# Patient Record
Sex: FEMALE | Race: OTHER | Employment: OTHER | URBAN - METROPOLITAN AREA
[De-identification: names, ages, dates, MRNs, and addresses within clinical notes are randomized per-mention and may not be internally consistent; named-entity substitution may affect disease eponyms.]

---

## 2017-06-13 ENCOUNTER — TELEPHONE (OUTPATIENT)
Dept: FAMILY MEDICINE CLINIC | Facility: CLINIC | Age: 67
End: 2017-06-13

## 2017-07-04 DIAGNOSIS — E78.2 MIXED HYPERLIPIDEMIA: ICD-10-CM

## 2017-07-04 DIAGNOSIS — I10 ESSENTIAL HYPERTENSION WITH GOAL BLOOD PRESSURE LESS THAN 140/90: ICD-10-CM

## 2017-07-05 RX ORDER — LOSARTAN POTASSIUM 100 MG/1
TABLET ORAL
Qty: 30 TABLET | Refills: 2 | Status: SHIPPED | OUTPATIENT
Start: 2017-07-05 | End: 2017-07-10 | Stop reason: ALTCHOICE

## 2017-07-05 RX ORDER — ATORVASTATIN CALCIUM 20 MG/1
TABLET, FILM COATED ORAL
Qty: 30 TABLET | Refills: 2 | Status: SHIPPED | OUTPATIENT
Start: 2017-07-05 | End: 2017-07-10 | Stop reason: ALTCHOICE

## 2017-07-05 NOTE — TELEPHONE ENCOUNTER
Refill as per protocol.     [facility] Visit date not found    Future Appointments  Date Time Provider Jaison Borjas   7/10/2017 11:00 AM Dangelo Rangel DO [facility]          Signed Prescriptions Disp Refills    LOSARTAN 100 MG Oral Tab 30 ta

## 2017-07-10 ENCOUNTER — OFFICE VISIT (OUTPATIENT)
Dept: FAMILY MEDICINE CLINIC | Facility: CLINIC | Age: 67
End: 2017-07-10

## 2017-07-10 VITALS
RESPIRATION RATE: 18 BRPM | OXYGEN SATURATION: 98 % | WEIGHT: 128.81 LBS | DIASTOLIC BLOOD PRESSURE: 60 MMHG | TEMPERATURE: 98 F | SYSTOLIC BLOOD PRESSURE: 110 MMHG | HEIGHT: 62 IN | BODY MASS INDEX: 23.7 KG/M2 | HEART RATE: 80 BPM

## 2017-07-10 DIAGNOSIS — E11.40 TYPE 2 DIABETES MELLITUS WITH DIABETIC NEUROPATHY, WITHOUT LONG-TERM CURRENT USE OF INSULIN (HCC): ICD-10-CM

## 2017-07-10 DIAGNOSIS — M54.6 CHRONIC BILATERAL THORACIC BACK PAIN: Primary | ICD-10-CM

## 2017-07-10 DIAGNOSIS — G89.29 CHRONIC BILATERAL THORACIC BACK PAIN: Primary | ICD-10-CM

## 2017-07-10 DIAGNOSIS — I10 ESSENTIAL HYPERTENSION: ICD-10-CM

## 2017-07-10 PROCEDURE — 99213 OFFICE O/P EST LOW 20 MIN: CPT | Performed by: FAMILY MEDICINE

## 2017-07-11 RX ORDER — CARVEDILOL 6.25 MG/1
6.25 TABLET ORAL DAILY
Qty: 180 TABLET | Refills: 0 | COMMUNITY
Start: 2017-07-11 | End: 2017-11-07

## 2017-07-12 NOTE — PROGRESS NOTES
HPI:   Andres Augustin is a 69year old female who is here for complaints of back pain, DM2, and HTN. Mid back pain, described as aching. Severity is mild, moderate. The pain radiates to no radiation of pain. Pain was precipitated by unknown.  Has had for 2 Smokeless tobacco: Never Used                      Alcohol use:  No                REVIEW OF SYSTEMS:   GENERAL: feels well otherwise  SKIN: denies any unusual skin lesions  LUNGS: denies shortness of breath with exertion  CARDIOVASCULAR: de return if sx's persist or worsen.

## 2017-08-01 DIAGNOSIS — E11.40 TYPE 2 DIABETES MELLITUS WITH DIABETIC NEUROPATHY, UNSPECIFIED LONG TERM INSULIN USE STATUS: ICD-10-CM

## 2017-08-01 DIAGNOSIS — I10 ESSENTIAL HYPERTENSION WITH GOAL BLOOD PRESSURE LESS THAN 140/90: ICD-10-CM

## 2017-08-01 RX ORDER — SPIRONOLACTONE 25 MG/1
25 TABLET ORAL DAILY
Qty: 90 TABLET | Refills: 0 | Status: SHIPPED | OUTPATIENT
Start: 2017-08-01 | End: 2017-11-04

## 2017-08-01 NOTE — TELEPHONE ENCOUNTER
Pending Prescriptions Disp Refills    METFORMIN  MG Oral Tab [Pharmacy Med Name: METFORMIN 500MG TAB] 60 tablet 2     Sig: TAKE ONE TABLET BY MOUTH TWICE DAILY WITH MEALS       Lov7/10/2017, No future appointments. unable to fill requested medicati

## 2017-08-12 DIAGNOSIS — I10 ESSENTIAL HYPERTENSION WITH GOAL BLOOD PRESSURE LESS THAN 140/90: ICD-10-CM

## 2017-08-12 RX ORDER — AMLODIPINE BESYLATE 5 MG/1
5 TABLET ORAL 2 TIMES DAILY
Qty: 180 TABLET | Refills: 0 | Status: SHIPPED | OUTPATIENT
Start: 2017-08-12 | End: 2017-11-04

## 2017-11-01 ENCOUNTER — OFFICE VISIT (OUTPATIENT)
Dept: FAMILY MEDICINE CLINIC | Facility: CLINIC | Age: 67
End: 2017-11-01

## 2017-11-01 VITALS
OXYGEN SATURATION: 100 % | HEART RATE: 66 BPM | WEIGHT: 133 LBS | DIASTOLIC BLOOD PRESSURE: 60 MMHG | TEMPERATURE: 98 F | BODY MASS INDEX: 24.48 KG/M2 | SYSTOLIC BLOOD PRESSURE: 110 MMHG | RESPIRATION RATE: 16 BRPM | HEIGHT: 62 IN

## 2017-11-01 DIAGNOSIS — Z23 NEED FOR VACCINATION: ICD-10-CM

## 2017-11-01 DIAGNOSIS — M25.511 CHRONIC RIGHT SHOULDER PAIN: ICD-10-CM

## 2017-11-01 DIAGNOSIS — G89.29 CHRONIC BILATERAL THORACIC BACK PAIN: ICD-10-CM

## 2017-11-01 DIAGNOSIS — G89.29 CHRONIC BILATERAL LOW BACK PAIN WITHOUT SCIATICA: ICD-10-CM

## 2017-11-01 DIAGNOSIS — M54.50 CHRONIC BILATERAL LOW BACK PAIN WITHOUT SCIATICA: ICD-10-CM

## 2017-11-01 DIAGNOSIS — Z00.00 LABORATORY EXAM ORDERED AS PART OF ROUTINE GENERAL MEDICAL EXAMINATION: ICD-10-CM

## 2017-11-01 DIAGNOSIS — E55.9 VITAMIN D DEFICIENCY: ICD-10-CM

## 2017-11-01 DIAGNOSIS — G89.29 CHRONIC RIGHT SHOULDER PAIN: ICD-10-CM

## 2017-11-01 DIAGNOSIS — E11.40 TYPE 2 DIABETES MELLITUS WITH DIABETIC NEUROPATHY, WITHOUT LONG-TERM CURRENT USE OF INSULIN (HCC): ICD-10-CM

## 2017-11-01 DIAGNOSIS — H25.9 AGE-RELATED CATARACT OF BOTH EYES, UNSPECIFIED AGE-RELATED CATARACT TYPE: ICD-10-CM

## 2017-11-01 DIAGNOSIS — M54.6 CHRONIC BILATERAL THORACIC BACK PAIN: ICD-10-CM

## 2017-11-01 DIAGNOSIS — I10 ESSENTIAL HYPERTENSION: Primary | ICD-10-CM

## 2017-11-01 PROCEDURE — 90653 IIV ADJUVANT VACCINE IM: CPT | Performed by: FAMILY MEDICINE

## 2017-11-01 PROCEDURE — 99214 OFFICE O/P EST MOD 30 MIN: CPT | Performed by: FAMILY MEDICINE

## 2017-11-01 PROCEDURE — 90471 IMMUNIZATION ADMIN: CPT | Performed by: FAMILY MEDICINE

## 2017-11-01 PROCEDURE — 90670 PCV13 VACCINE IM: CPT | Performed by: FAMILY MEDICINE

## 2017-11-01 PROCEDURE — 90472 IMMUNIZATION ADMIN EACH ADD: CPT | Performed by: FAMILY MEDICINE

## 2017-11-02 NOTE — PROGRESS NOTES
John Matson is a 69year old female. HPI:   Patient presents for recheck of her hypertension, DM2, c/o chronic back pain, R shoulder pain and vision changes. HTN, pt has been taking medications as instructed, no medication side effects.   DM2, compl mouth daily.  Disp: 180 tablet Rfl: 0   Atorvastatin Calcium 20 MG Oral Tab TAKE ONE TABLET BY MOUTH AT BEDTIME Disp: 90 tablet Rfl: 0   MetFORMIN HCl 500 MG Oral Tab TAKE ONE TABLET BY MOUTH TWICE DAILY WITH MEALS Disp: 180 tablet Rfl: 0   Losartan Mj regions, neg straight leg bilaterally, DTRs wnl and motor/sensation intact    ASSESSMENT AND PLAN:   Pt presents for:  1.  Essential hypertension  - bp stable  - cpm  - compliance with the medication is advised  - limit the amount of sodium (salt) in diet <

## 2017-11-03 ENCOUNTER — LAB ENCOUNTER (OUTPATIENT)
Dept: LAB | Age: 67
End: 2017-11-03
Attending: FAMILY MEDICINE
Payer: MEDICAID

## 2017-11-03 DIAGNOSIS — I10 ESSENTIAL HYPERTENSION: ICD-10-CM

## 2017-11-03 DIAGNOSIS — Z00.00 LABORATORY EXAM ORDERED AS PART OF ROUTINE GENERAL MEDICAL EXAMINATION: ICD-10-CM

## 2017-11-03 DIAGNOSIS — E11.40 TYPE 2 DIABETES MELLITUS WITH DIABETIC NEUROPATHY, WITHOUT LONG-TERM CURRENT USE OF INSULIN (HCC): ICD-10-CM

## 2017-11-03 DIAGNOSIS — E55.9 VITAMIN D DEFICIENCY: ICD-10-CM

## 2017-11-03 PROCEDURE — 80050 GENERAL HEALTH PANEL: CPT | Performed by: FAMILY MEDICINE

## 2017-11-03 PROCEDURE — 36415 COLL VENOUS BLD VENIPUNCTURE: CPT | Performed by: FAMILY MEDICINE

## 2017-11-03 PROCEDURE — 80061 LIPID PANEL: CPT | Performed by: FAMILY MEDICINE

## 2017-11-03 PROCEDURE — 82043 UR ALBUMIN QUANTITATIVE: CPT | Performed by: FAMILY MEDICINE

## 2017-11-03 PROCEDURE — 83036 HEMOGLOBIN GLYCOSYLATED A1C: CPT | Performed by: FAMILY MEDICINE

## 2017-11-03 PROCEDURE — 82306 VITAMIN D 25 HYDROXY: CPT | Performed by: FAMILY MEDICINE

## 2017-11-03 PROCEDURE — 82570 ASSAY OF URINE CREATININE: CPT | Performed by: FAMILY MEDICINE

## 2017-11-04 DIAGNOSIS — E78.2 MIXED HYPERLIPIDEMIA: ICD-10-CM

## 2017-11-04 DIAGNOSIS — I10 ESSENTIAL HYPERTENSION WITH GOAL BLOOD PRESSURE LESS THAN 140/90: ICD-10-CM

## 2017-11-06 RX ORDER — SPIRONOLACTONE 25 MG/1
TABLET ORAL
Qty: 90 TABLET | Refills: 0 | Status: SHIPPED | OUTPATIENT
Start: 2017-11-06 | End: 2018-01-22

## 2017-11-06 RX ORDER — ATORVASTATIN CALCIUM 20 MG/1
TABLET, FILM COATED ORAL
Qty: 90 TABLET | Refills: 0 | Status: SHIPPED | OUTPATIENT
Start: 2017-11-06 | End: 2018-03-16

## 2017-11-06 RX ORDER — AMLODIPINE BESYLATE 5 MG/1
TABLET ORAL
Qty: 180 TABLET | Refills: 0 | Status: SHIPPED | OUTPATIENT
Start: 2017-11-06 | End: 2018-01-22

## 2017-11-06 NOTE — TELEPHONE ENCOUNTER
Refill as per protocol. LOV 11/1/2017    No future appointments.        Signed Prescriptions Disp Refills    SPIRONOLACTONE 25 MG Oral Tab 90 tablet 0      Sig: TAKE ONE TABLET BY MOUTH ONCE DAILY        Authorizing Provider: Johnny Cardoso

## 2017-11-07 DIAGNOSIS — E11.40 TYPE 2 DIABETES MELLITUS WITH DIABETIC NEUROPATHY, WITHOUT LONG-TERM CURRENT USE OF INSULIN (HCC): Primary | ICD-10-CM

## 2017-11-07 DIAGNOSIS — E11.40 TYPE 2 DIABETES MELLITUS WITH DIABETIC NEUROPATHY (HCC): ICD-10-CM

## 2017-11-07 RX ORDER — CARVEDILOL 6.25 MG/1
6.25 TABLET ORAL DAILY
Qty: 180 TABLET | Refills: 0 | Status: SHIPPED | OUTPATIENT
Start: 2017-11-07 | End: 2018-05-19

## 2017-11-07 NOTE — TELEPHONE ENCOUNTER
Refill as per protocol.     LOV 11/1/2017    Future Appointments  Date Time Provider Jaison Borjas   11/9/2017 12:45 PM Jamil Woods, PT [facility]   11/14/2017 12:45 PM Jamil Woods, PT [facility]   11/16/2017 10:30 AM Jamil Woods,

## 2017-11-07 NOTE — TELEPHONE ENCOUNTER
Refill as per protocol.     LOV 11/1/2017    Future Appointments  Date Time Provider Jaison Borjas   11/9/2017 12:45 PM Fatoumata Median, PT [facility]   11/14/2017 12:45 PM Fatoumata Median, PT [facility]   11/16/2017 10:30 AM Fatoumata Median,

## 2017-11-09 ENCOUNTER — OFFICE VISIT (OUTPATIENT)
Dept: PHYSICAL THERAPY | Age: 67
End: 2017-11-09
Attending: FAMILY MEDICINE
Payer: MEDICAID

## 2017-11-09 DIAGNOSIS — G89.29 CHRONIC BILATERAL LOW BACK PAIN WITHOUT SCIATICA: ICD-10-CM

## 2017-11-09 DIAGNOSIS — G89.29 CHRONIC RIGHT SHOULDER PAIN: ICD-10-CM

## 2017-11-09 DIAGNOSIS — M25.511 CHRONIC RIGHT SHOULDER PAIN: ICD-10-CM

## 2017-11-09 DIAGNOSIS — M54.50 CHRONIC BILATERAL LOW BACK PAIN WITHOUT SCIATICA: ICD-10-CM

## 2017-11-09 DIAGNOSIS — M54.6 CHRONIC BILATERAL THORACIC BACK PAIN: ICD-10-CM

## 2017-11-09 DIAGNOSIS — G89.29 CHRONIC BILATERAL THORACIC BACK PAIN: ICD-10-CM

## 2017-11-09 PROCEDURE — 97163 PT EVAL HIGH COMPLEX 45 MIN: CPT

## 2017-11-09 PROCEDURE — 97110 THERAPEUTIC EXERCISES: CPT

## 2017-11-09 NOTE — PROGRESS NOTES
SPINE EVALUATION:   Referring Physician: Dr. Sanjeev Fox  Diagnosis: Chronic bilateral thoracic back pain (M54.6,G89.29)  Chronic right shoulder pain (M25.511,G89.29)  Chronic bilateral low back pain without sciatica (M54.5,G89.29)     Date of Service: 11/9/ Flexion: 50 deg  Extension: 50 deg  Sidebending: R 10 deg, pain L; L 10 deg, pain L  Rotation: R 70 deg; L 65 deg, pain L    Thoracic ROM:   Flexion: 85 deg  Extension: 15 deg  Sidebending: R 30 deg; L 30 deg  Rotation: R 50%; L 50%    Accessory motion: findings, precautions, and treatment options and has agreed to actively participate in planning and for this course of care. Thank you for your referral. Please co-sign or sign and return this letter via fax as soon as possible to [phone].  If you h

## 2017-11-14 ENCOUNTER — OFFICE VISIT (OUTPATIENT)
Dept: PHYSICAL THERAPY | Age: 67
End: 2017-11-14
Attending: FAMILY MEDICINE
Payer: MEDICAID

## 2017-11-14 PROCEDURE — 97110 THERAPEUTIC EXERCISES: CPT

## 2017-11-14 PROCEDURE — 97140 MANUAL THERAPY 1/> REGIONS: CPT

## 2017-11-14 NOTE — PROGRESS NOTES
Dx: Chronic bilateral thoracic back pain (M54.6,G89.29)  Chronic right shoulder pain (M25.511,G89.29)  Chronic bilateral low back pain without sciatica (M54.5,G89.29)         Authorized # of Visits:  6         Next MD visit: none scheduled  Fall Risk: tee

## 2017-11-16 ENCOUNTER — OFFICE VISIT (OUTPATIENT)
Dept: PHYSICAL THERAPY | Age: 67
End: 2017-11-16
Attending: FAMILY MEDICINE
Payer: MEDICAID

## 2017-11-16 PROCEDURE — 97140 MANUAL THERAPY 1/> REGIONS: CPT

## 2017-11-16 PROCEDURE — 97110 THERAPEUTIC EXERCISES: CPT

## 2017-11-16 NOTE — PROGRESS NOTES
Dx: Chronic bilateral thoracic back pain (M54.6,G89.29)  Chronic right shoulder pain (M25.511,G89.29)  Chronic bilateral low back pain without sciatica (M54.5,G89.29)         Authorized # of Visits:  6         Next MD visit: none scheduled  Fall Risk: tee Charges: Ex 2 MT 1       Total Timed Treatment: 45 min  Total Treatment Time: 45 min

## 2017-11-20 ENCOUNTER — APPOINTMENT (OUTPATIENT)
Dept: PHYSICAL THERAPY | Age: 67
End: 2017-11-20
Payer: MEDICAID

## 2017-11-21 ENCOUNTER — OFFICE VISIT (OUTPATIENT)
Dept: PHYSICAL THERAPY | Age: 67
End: 2017-11-21
Attending: FAMILY MEDICINE
Payer: MEDICAID

## 2017-11-21 PROCEDURE — 97110 THERAPEUTIC EXERCISES: CPT

## 2017-11-21 PROCEDURE — 97140 MANUAL THERAPY 1/> REGIONS: CPT

## 2017-11-21 NOTE — PROGRESS NOTES
Dx: Chronic bilateral thoracic back pain (M54.6,G89.29)  Chronic right shoulder pain (M25.511,G89.29)  Chronic bilateral low back pain without sciatica (M54.5,G89.29)         Authorized # of Visits:  6         Next MD visit: none scheduled  Fall Risk: tee Prone: thoracic spine PA mobilizations Gr ll;  Lateral mobilizations L/R Gr ll-lll-        prone on bed: bilat rows, bilat sh ext, bilat scapular retraction goal post x10 ea  prone on bed: bilat rows, bilat sh ext, bilat scapular retraction goal post x10 e

## 2017-11-27 ENCOUNTER — OFFICE VISIT (OUTPATIENT)
Dept: FAMILY MEDICINE CLINIC | Facility: CLINIC | Age: 67
End: 2017-11-27

## 2017-11-27 ENCOUNTER — OFFICE VISIT (OUTPATIENT)
Dept: PHYSICAL THERAPY | Age: 67
End: 2017-11-27
Attending: FAMILY MEDICINE
Payer: MEDICAID

## 2017-11-27 VITALS
WEIGHT: 134 LBS | RESPIRATION RATE: 18 BRPM | HEART RATE: 77 BPM | BODY MASS INDEX: 24.66 KG/M2 | TEMPERATURE: 98 F | HEIGHT: 62 IN | OXYGEN SATURATION: 99 % | DIASTOLIC BLOOD PRESSURE: 62 MMHG | SYSTOLIC BLOOD PRESSURE: 110 MMHG

## 2017-11-27 DIAGNOSIS — E11.40 TYPE 2 DIABETES MELLITUS WITH DIABETIC NEUROPATHY, WITHOUT LONG-TERM CURRENT USE OF INSULIN (HCC): Primary | ICD-10-CM

## 2017-11-27 DIAGNOSIS — D64.9 ANEMIA, UNSPECIFIED TYPE: ICD-10-CM

## 2017-11-27 DIAGNOSIS — N18.4 CKD (CHRONIC KIDNEY DISEASE) STAGE 4, GFR 15-29 ML/MIN (HCC): ICD-10-CM

## 2017-11-27 DIAGNOSIS — M85.80 OSTEOPENIA, UNSPECIFIED LOCATION: ICD-10-CM

## 2017-11-27 PROBLEM — N18.30 CKD (CHRONIC KIDNEY DISEASE) STAGE 3, GFR 30-59 ML/MIN (HCC): Status: ACTIVE | Noted: 2017-11-27

## 2017-11-27 PROCEDURE — 82746 ASSAY OF FOLIC ACID SERUM: CPT | Performed by: FAMILY MEDICINE

## 2017-11-27 PROCEDURE — 82607 VITAMIN B-12: CPT | Performed by: FAMILY MEDICINE

## 2017-11-27 PROCEDURE — 99214 OFFICE O/P EST MOD 30 MIN: CPT | Performed by: FAMILY MEDICINE

## 2017-11-27 PROCEDURE — 36415 COLL VENOUS BLD VENIPUNCTURE: CPT | Performed by: FAMILY MEDICINE

## 2017-11-27 PROCEDURE — 97140 MANUAL THERAPY 1/> REGIONS: CPT

## 2017-11-27 PROCEDURE — 80048 BASIC METABOLIC PNL TOTAL CA: CPT | Performed by: FAMILY MEDICINE

## 2017-11-27 PROCEDURE — 82728 ASSAY OF FERRITIN: CPT | Performed by: FAMILY MEDICINE

## 2017-11-27 PROCEDURE — 83540 ASSAY OF IRON: CPT | Performed by: FAMILY MEDICINE

## 2017-11-27 PROCEDURE — 97110 THERAPEUTIC EXERCISES: CPT

## 2017-11-27 PROCEDURE — 83550 IRON BINDING TEST: CPT | Performed by: FAMILY MEDICINE

## 2017-11-27 NOTE — PROGRESS NOTES
Dx: Chronic bilateral thoracic back pain (M54.6,G89.29)  Chronic right shoulder pain (M25.511,G89.29)  Chronic bilateral low back pain without sciatica (M54.5,G89.29)         Authorized # of Visits:  6         Next MD visit: none scheduled  Fall Risk: tee chair x12         Hkly:  TrA 10x10 sec      Prone: thoracic spine PA mobilizations Gr ll;  Lateral mobilizations L/R Gr ll-lll-  Prone: thoracic spine PA mobilizations Gr ll;  Lateral mobilizations L/R Gr ll-lll-  Prone: thoracic spine PA mobilizations Gr l

## 2017-11-27 NOTE — PROGRESS NOTES
HPI:   Jean Duckworth is a 69year old female who presents for recheck of her diabetes, HL and recent blood work. DM2, taking medication as directed, metformin 500mg po qam. Patient’s FBS have been 80-100s. Last visit with ophthalmologist was last week.   P 173 03/03/2016     Lab Results  Component Value Date   HDL 49 11/03/2017   HDL 40 (L) 10/04/2016   HDL 43 (L) 03/03/2016     Lab Results  Component Value Date   LDL 33 11/03/2017   LDL 47 10/04/2016    03/03/2016     Lab Results  Component Value Dino GENERAL: well developed, well nourished,in no apparent distress  SKIN: no rashes,no suspicious lesions  NECK: supple   LUNGS: clear to auscultation  CARDIO: RRR without murmur  GI: soft, nt/nd, +BS in all four quadrants, no r/r/g  EXTREMITIES: no cyanosi

## 2017-11-28 ENCOUNTER — NURSE ONLY (OUTPATIENT)
Dept: FAMILY MEDICINE CLINIC | Facility: CLINIC | Age: 67
End: 2017-11-28

## 2017-11-28 DIAGNOSIS — E53.8 VITAMIN B12 DEFICIENCY: Primary | ICD-10-CM

## 2017-11-28 PROCEDURE — 99211 OFF/OP EST MAY X REQ PHY/QHP: CPT | Performed by: FAMILY MEDICINE

## 2017-11-28 PROCEDURE — 96372 THER/PROPH/DIAG INJ SC/IM: CPT | Performed by: FAMILY MEDICINE

## 2017-11-28 RX ORDER — CYANOCOBALAMIN 1000 UG/ML
1000 INJECTION INTRAMUSCULAR; SUBCUTANEOUS ONCE
Status: COMPLETED | OUTPATIENT
Start: 2017-11-28 | End: 2017-11-28

## 2017-11-28 RX ADMIN — CYANOCOBALAMIN 1000 MCG: 1000 INJECTION INTRAMUSCULAR; SUBCUTANEOUS at 17:13:00

## 2017-11-30 ENCOUNTER — APPOINTMENT (OUTPATIENT)
Dept: PHYSICAL THERAPY | Age: 67
End: 2017-11-30
Attending: FAMILY MEDICINE
Payer: MEDICAID

## 2017-11-30 PROCEDURE — 97140 MANUAL THERAPY 1/> REGIONS: CPT

## 2017-11-30 PROCEDURE — 97110 THERAPEUTIC EXERCISES: CPT

## 2017-11-30 NOTE — PROGRESS NOTES
Discharge Summary    Pt has attended 6, cancelled 0, and no shown 0 visits in Physical Therapy. Marley Montero reports feeling 85% improvement in back pain and function since starting therapy.   She reports improved tolerance for cooking, standing and walking wi Date____________________    Certification From: 34/57/3274  To:2/28/2018       Dx: Chronic bilateral thoracic back pain (M54.6,G89.29)  Chronic right shoulder pain (M25.511,G89.29)  Chronic bilateral low back pain without sciatica (M54.5,G89.29)         Au ll;  Lateral mobilizations L/R Gr ll-lll-  Prone: thoracic spine PA mobilizations Gr ll;  Lateral mobilizations L/R Gr ll-lll-  Prone: thoracic spine PA mobilizations Gr ll;  Lateral mobilizations L/R Gr ll-lll-  Prone: thoracic spine PA mobilizations Gr l

## 2017-12-04 ENCOUNTER — APPOINTMENT (OUTPATIENT)
Dept: PHYSICAL THERAPY | Age: 67
End: 2017-12-04
Attending: FAMILY MEDICINE
Payer: MEDICAID

## 2017-12-04 DIAGNOSIS — I10 ESSENTIAL HYPERTENSION WITH GOAL BLOOD PRESSURE LESS THAN 140/90: ICD-10-CM

## 2017-12-04 RX ORDER — LOSARTAN POTASSIUM 100 MG/1
TABLET ORAL
Qty: 30 TABLET | Refills: 2 | Status: SHIPPED | OUTPATIENT
Start: 2017-12-04 | End: 2018-03-16

## 2017-12-06 ENCOUNTER — NURSE ONLY (OUTPATIENT)
Dept: FAMILY MEDICINE CLINIC | Facility: CLINIC | Age: 67
End: 2017-12-06

## 2017-12-06 DIAGNOSIS — E11.40 TYPE 2 DIABETES MELLITUS WITH DIABETIC NEUROPATHY, WITHOUT LONG-TERM CURRENT USE OF INSULIN (HCC): ICD-10-CM

## 2017-12-06 DIAGNOSIS — E53.8 VITAMIN B12 DEFICIENCY: Primary | ICD-10-CM

## 2017-12-06 PROCEDURE — 96372 THER/PROPH/DIAG INJ SC/IM: CPT | Performed by: FAMILY MEDICINE

## 2017-12-06 RX ORDER — CYANOCOBALAMIN 1000 UG/ML
1000 INJECTION INTRAMUSCULAR; SUBCUTANEOUS ONCE
Status: COMPLETED | OUTPATIENT
Start: 2017-12-06 | End: 2017-12-06

## 2017-12-06 RX ADMIN — CYANOCOBALAMIN 1000 MCG: 1000 INJECTION INTRAMUSCULAR; SUBCUTANEOUS at 10:23:00

## 2017-12-07 ENCOUNTER — APPOINTMENT (OUTPATIENT)
Dept: PHYSICAL THERAPY | Age: 67
End: 2017-12-07
Attending: FAMILY MEDICINE
Payer: MEDICAID

## 2017-12-13 ENCOUNTER — NURSE ONLY (OUTPATIENT)
Dept: FAMILY MEDICINE CLINIC | Facility: CLINIC | Age: 67
End: 2017-12-13

## 2017-12-13 DIAGNOSIS — E53.8 VITAMIN B12 DEFICIENCY: Primary | ICD-10-CM

## 2017-12-13 PROCEDURE — 96372 THER/PROPH/DIAG INJ SC/IM: CPT | Performed by: FAMILY MEDICINE

## 2017-12-13 RX ORDER — CYANOCOBALAMIN 1000 UG/ML
1000 INJECTION INTRAMUSCULAR; SUBCUTANEOUS ONCE
Status: COMPLETED | OUTPATIENT
Start: 2017-12-13 | End: 2017-12-13

## 2017-12-13 RX ADMIN — CYANOCOBALAMIN 1000 MCG: 1000 INJECTION INTRAMUSCULAR; SUBCUTANEOUS at 17:16:00

## 2017-12-21 ENCOUNTER — NURSE ONLY (OUTPATIENT)
Dept: FAMILY MEDICINE CLINIC | Facility: CLINIC | Age: 67
End: 2017-12-21

## 2017-12-21 DIAGNOSIS — E53.8 VITAMIN B12 DEFICIENCY: Primary | ICD-10-CM

## 2017-12-21 PROCEDURE — 99211 OFF/OP EST MAY X REQ PHY/QHP: CPT | Performed by: FAMILY MEDICINE

## 2017-12-21 RX ORDER — CYANOCOBALAMIN 1000 UG/ML
1000 INJECTION INTRAMUSCULAR; SUBCUTANEOUS ONCE
Status: DISCONTINUED | OUTPATIENT
Start: 2017-12-21 | End: 2019-02-04 | Stop reason: ALTCHOICE

## 2017-12-27 ENCOUNTER — NURSE ONLY (OUTPATIENT)
Dept: FAMILY MEDICINE CLINIC | Facility: CLINIC | Age: 67
End: 2017-12-27

## 2017-12-27 DIAGNOSIS — E53.8 VITAMIN B12 DEFICIENCY: Primary | ICD-10-CM

## 2017-12-27 PROCEDURE — 96372 THER/PROPH/DIAG INJ SC/IM: CPT | Performed by: FAMILY MEDICINE

## 2017-12-27 RX ORDER — CYANOCOBALAMIN 1000 UG/ML
1000 INJECTION INTRAMUSCULAR; SUBCUTANEOUS ONCE
Status: COMPLETED | OUTPATIENT
Start: 2017-12-27 | End: 2017-12-27

## 2017-12-27 RX ADMIN — CYANOCOBALAMIN 1000 MCG: 1000 INJECTION INTRAMUSCULAR; SUBCUTANEOUS at 14:19:00

## 2018-01-04 ENCOUNTER — OFFICE VISIT (OUTPATIENT)
Dept: FAMILY MEDICINE CLINIC | Facility: CLINIC | Age: 68
End: 2018-01-04

## 2018-01-04 VITALS
RESPIRATION RATE: 16 BRPM | HEIGHT: 62 IN | TEMPERATURE: 98 F | DIASTOLIC BLOOD PRESSURE: 60 MMHG | OXYGEN SATURATION: 99 % | HEART RATE: 69 BPM | BODY MASS INDEX: 25.03 KG/M2 | SYSTOLIC BLOOD PRESSURE: 112 MMHG | WEIGHT: 136 LBS

## 2018-01-04 DIAGNOSIS — N81.4 PROLAPSED UTERUS: Primary | ICD-10-CM

## 2018-01-04 DIAGNOSIS — E53.8 VITAMIN B12 DEFICIENCY: ICD-10-CM

## 2018-01-04 PROCEDURE — 99214 OFFICE O/P EST MOD 30 MIN: CPT | Performed by: FAMILY MEDICINE

## 2018-01-04 NOTE — PROGRESS NOTES
HPI:    Patient ID: Candy Johnson is a 69year old female. HPI   75yr old female presents with c/o feeling a protrusion coming through when she urinates and then recedes for the past 1-1.5mo. Denies any urinary symptoms, incontinence or pelvic pain.    V time. She appears well-developed and well-nourished. HENT:   Head: Normocephalic and atraumatic. Cardiovascular: Normal rate and regular rhythm. Pulmonary/Chest: Effort normal and breath sounds normal. No respiratory distress. She has no wheezes.  Sh

## 2018-01-22 DIAGNOSIS — I10 ESSENTIAL HYPERTENSION WITH GOAL BLOOD PRESSURE LESS THAN 140/90: ICD-10-CM

## 2018-01-22 RX ORDER — AMLODIPINE BESYLATE 5 MG/1
TABLET ORAL
Qty: 180 TABLET | Refills: 0 | Status: SHIPPED | OUTPATIENT
Start: 2018-01-22 | End: 2018-05-19

## 2018-01-22 RX ORDER — SPIRONOLACTONE 25 MG/1
TABLET ORAL
Qty: 90 TABLET | Refills: 0 | Status: SHIPPED | OUTPATIENT
Start: 2018-01-22 | End: 2018-05-19

## 2018-02-13 ENCOUNTER — NURSE ONLY (OUTPATIENT)
Dept: FAMILY MEDICINE CLINIC | Facility: CLINIC | Age: 68
End: 2018-02-13

## 2018-02-14 PROCEDURE — 96372 THER/PROPH/DIAG INJ SC/IM: CPT | Performed by: FAMILY MEDICINE

## 2018-02-14 RX ORDER — CYANOCOBALAMIN 1000 UG/ML
1000 INJECTION INTRAMUSCULAR; SUBCUTANEOUS ONCE
Status: COMPLETED | OUTPATIENT
Start: 2018-02-14 | End: 2018-02-14

## 2018-02-14 RX ADMIN — CYANOCOBALAMIN 1000 MCG: 1000 INJECTION INTRAMUSCULAR; SUBCUTANEOUS at 15:08:00

## 2018-03-02 ENCOUNTER — TELEPHONE (OUTPATIENT)
Dept: FAMILY MEDICINE CLINIC | Facility: CLINIC | Age: 68
End: 2018-03-02

## 2018-03-02 DIAGNOSIS — Z12.31 VISIT FOR SCREENING MAMMOGRAM: Primary | ICD-10-CM

## 2018-03-06 ENCOUNTER — TELEPHONE (OUTPATIENT)
Dept: UROLOGY | Facility: HOSPITAL | Age: 68
End: 2018-03-06

## 2018-03-06 ENCOUNTER — OFFICE VISIT (OUTPATIENT)
Dept: UROLOGY | Facility: HOSPITAL | Age: 68
End: 2018-03-06
Attending: OBSTETRICS & GYNECOLOGY
Payer: MEDICAID

## 2018-03-06 VITALS
SYSTOLIC BLOOD PRESSURE: 108 MMHG | HEIGHT: 62 IN | BODY MASS INDEX: 25.03 KG/M2 | DIASTOLIC BLOOD PRESSURE: 52 MMHG | WEIGHT: 136 LBS

## 2018-03-06 DIAGNOSIS — N81.84 PELVIC MUSCLE WASTING: ICD-10-CM

## 2018-03-06 DIAGNOSIS — N95.2 POSTMENOPAUSAL ATROPHIC VAGINITIS: ICD-10-CM

## 2018-03-06 DIAGNOSIS — N81.2 UTEROVAGINAL PROLAPSE, INCOMPLETE: Primary | ICD-10-CM

## 2018-03-06 LAB
CONTROL RUN WITHIN 24 HOURS?: YES
NITRITE URINE: NEGATIVE

## 2018-03-06 PROCEDURE — 99201 HC OUTPT EVAL AND MGNT NEW PT LEVEL 1: CPT

## 2018-03-06 PROCEDURE — 81002 URINALYSIS NONAUTO W/O SCOPE: CPT

## 2018-03-06 PROCEDURE — 87086 URINE CULTURE/COLONY COUNT: CPT | Performed by: OBSTETRICS & GYNECOLOGY

## 2018-03-06 PROCEDURE — 57160 INSERT PESSARY/OTHER DEVICE: CPT

## 2018-03-06 RX ORDER — ESTRADIOL 0.1 MG/G
CREAM VAGINAL
Qty: 1 TUBE | Refills: 3 | Status: SHIPPED | OUTPATIENT
Start: 2018-03-06 | End: 2019-02-04 | Stop reason: ALTCHOICE

## 2018-03-06 NOTE — PROGRESS NOTES
Rajan Lucero DO  3/6/2018     Referred by Dr. Yusuf Staples    Patient presents with:  Prolapse: referred by Dr. Yusuf Staples for prolapse, noticed since       HPI:  No FAY  No UUI  + prolapse, feels bulge worsening (comes and goes)  Denies splinting  Not se tablet Rfl: 0   ATORVASTATIN 20 MG Oral Tab TAKE ONE TABLET BY MOUTH AT BEDTIME Disp: 90 tablet Rfl: 0   MetFORMIN HCl 500 MG Oral Tab TAKE ONE TABLET BY MOUTH TWICE DAILY WITH MEALS Disp: 180 tablet Rfl: 0   aspirin 81 MG Oral Tab Take 1 tablet by mouth d Postmenopausal atrophic vaginitis  Plan: Estradiol (ESTRACE) 0.1 MG/GM Vaginal Cream,         POCT URINALYSIS DIPSTICK, URINE CULTURE,         ROUTINE    (N81.84) Pelvic muscle wasting  Plan: POCT URINALYSIS DIPSTICK, URINE CULTURE,         ROUTINE      Cade Cava

## 2018-03-06 NOTE — PATIENT INSTRUCTIONS
[facility]    PELVIC MUSCLE EXERCISES [facility])    The muscles that surround the vagina help to support the pelvic organs and maintain bladder and bowel control are called the “pelvic floor muscles”.   Exercises for these musc effort in an active squeeze in order to strengthen the muscles. It is better to maintain a strong active squeeze for a short period of time that to flick the muscle on and off for any period of time.   You will need to work up to a full 10-second squeeze g

## 2018-03-12 ENCOUNTER — TELEPHONE (OUTPATIENT)
Dept: UROLOGY | Facility: HOSPITAL | Age: 68
End: 2018-03-12

## 2018-03-12 NOTE — TELEPHONE ENCOUNTER
.Phoned patient, daughter, Keagan Dasilva,  answered and informed of normal urine culture results (ok per HIPPA forms)   Daughter verbalized an understanding and reports will tell mom.

## 2018-03-16 DIAGNOSIS — I10 ESSENTIAL HYPERTENSION WITH GOAL BLOOD PRESSURE LESS THAN 140/90: ICD-10-CM

## 2018-03-16 DIAGNOSIS — E78.2 MIXED HYPERLIPIDEMIA: ICD-10-CM

## 2018-03-17 RX ORDER — ATORVASTATIN CALCIUM 20 MG/1
TABLET, FILM COATED ORAL
Qty: 90 TABLET | Refills: 0 | Status: SHIPPED | OUTPATIENT
Start: 2018-03-17 | End: 2018-06-22

## 2018-03-17 RX ORDER — LOSARTAN POTASSIUM 100 MG/1
TABLET ORAL
Qty: 30 TABLET | Refills: 2 | Status: SHIPPED | OUTPATIENT
Start: 2018-03-17 | End: 2018-07-18

## 2018-03-17 NOTE — TELEPHONE ENCOUNTER
Pending Prescriptions Disp Refills    ATORVASTATIN 20 MG Oral Tab [Pharmacy Med Name: ATORVASTATIN 20MG   TAB] 90 tablet 0     Sig: TAKE ONE TABLET BY MOUTH AT BEDTIME      LOSARTAN 100 MG Oral Tab [Pharmacy Med Name: LOSARTAN 100MG   TAB] 30 tablet 2

## 2018-03-22 ENCOUNTER — OFFICE VISIT (OUTPATIENT)
Dept: UROLOGY | Facility: HOSPITAL | Age: 68
End: 2018-03-22
Attending: OBSTETRICS & GYNECOLOGY
Payer: MEDICAID

## 2018-03-22 ENCOUNTER — NURSE ONLY (OUTPATIENT)
Dept: FAMILY MEDICINE CLINIC | Facility: CLINIC | Age: 68
End: 2018-03-22

## 2018-03-22 VITALS
DIASTOLIC BLOOD PRESSURE: 78 MMHG | BODY MASS INDEX: 25.03 KG/M2 | HEIGHT: 62 IN | WEIGHT: 136 LBS | SYSTOLIC BLOOD PRESSURE: 122 MMHG

## 2018-03-22 DIAGNOSIS — N81.84 PELVIC MUSCLE WASTING: ICD-10-CM

## 2018-03-22 DIAGNOSIS — N81.2 UTEROVAGINAL PROLAPSE, INCOMPLETE: Primary | ICD-10-CM

## 2018-03-22 DIAGNOSIS — E53.8 VITAMIN B12 DEFICIENCY: Primary | ICD-10-CM

## 2018-03-22 DIAGNOSIS — N95.2 POSTMENOPAUSAL ATROPHIC VAGINITIS: ICD-10-CM

## 2018-03-22 PROCEDURE — 96372 THER/PROPH/DIAG INJ SC/IM: CPT | Performed by: FAMILY MEDICINE

## 2018-03-22 PROCEDURE — 99211 OFF/OP EST MAY X REQ PHY/QHP: CPT

## 2018-03-22 RX ORDER — CYANOCOBALAMIN 1000 UG/ML
1000 INJECTION INTRAMUSCULAR; SUBCUTANEOUS ONCE
Status: COMPLETED | OUTPATIENT
Start: 2018-03-22 | End: 2018-03-22

## 2018-03-22 RX ADMIN — CYANOCOBALAMIN 1000 MCG: 1000 INJECTION INTRAMUSCULAR; SUBCUTANEOUS at 15:51:00

## 2018-03-22 NOTE — PROCEDURES
.Patient here for education on self-management of pessary. Discussed fitting, comfort, maintenance, and potential complications of pessary including but not limited to UTI, vaginal bleeding, vaginal infection, and leakage of urine.   Patient demonstrated

## 2018-04-19 NOTE — ED INITIAL ASSESSMENT (HPI)
Patient here with complaint of anxiety, panic attack, patient's   tonight. Patient just reports not feeling well, hyperventilating.

## 2018-04-19 NOTE — ED PROVIDER NOTES
Patient Seen in: [facility] Emergency Department    History   Patient presents with: Anxiety/Panic attack (neurologic)  Stress    Stated Complaint: anxiety, stress    HPI    57-year-old woman presents today with anxiety attack.   Patient  had normal. No respiratory distress. Abdominal: Soft. She exhibits no distension. There is no tenderness. Musculoskeletal: Normal range of motion. She exhibits no tenderness. Neurological: She is alert and oriented to person, place, and time.  She exhibit

## 2018-05-10 ENCOUNTER — OFFICE VISIT (OUTPATIENT)
Dept: FAMILY MEDICINE CLINIC | Facility: CLINIC | Age: 68
End: 2018-05-10

## 2018-05-10 VITALS
SYSTOLIC BLOOD PRESSURE: 130 MMHG | BODY MASS INDEX: 25.28 KG/M2 | HEIGHT: 62 IN | RESPIRATION RATE: 16 BRPM | HEART RATE: 74 BPM | OXYGEN SATURATION: 98 % | DIASTOLIC BLOOD PRESSURE: 60 MMHG | TEMPERATURE: 99 F | WEIGHT: 137.38 LBS

## 2018-05-10 DIAGNOSIS — F43.21 GRIEF REACTION: ICD-10-CM

## 2018-05-10 DIAGNOSIS — I10 ESSENTIAL HYPERTENSION: ICD-10-CM

## 2018-05-10 DIAGNOSIS — R30.0 DYSURIA: ICD-10-CM

## 2018-05-10 DIAGNOSIS — R60.0 BILATERAL LOWER EXTREMITY EDEMA: Primary | ICD-10-CM

## 2018-05-10 DIAGNOSIS — N18.30 CKD (CHRONIC KIDNEY DISEASE) STAGE 3, GFR 30-59 ML/MIN (HCC): ICD-10-CM

## 2018-05-10 PROCEDURE — 99214 OFFICE O/P EST MOD 30 MIN: CPT | Performed by: FAMILY MEDICINE

## 2018-05-10 PROCEDURE — 87077 CULTURE AEROBIC IDENTIFY: CPT | Performed by: FAMILY MEDICINE

## 2018-05-10 PROCEDURE — 80048 BASIC METABOLIC PNL TOTAL CA: CPT | Performed by: FAMILY MEDICINE

## 2018-05-10 PROCEDURE — 87086 URINE CULTURE/COLONY COUNT: CPT | Performed by: FAMILY MEDICINE

## 2018-05-10 RX ORDER — FUROSEMIDE 20 MG/1
20 TABLET ORAL 2 TIMES DAILY
Qty: 10 TABLET | Refills: 0 | Status: SHIPPED | OUTPATIENT
Start: 2018-05-10 | End: 2019-01-29

## 2018-05-13 NOTE — PROGRESS NOTES
HPI:    Patient ID: Miguel Angel De La Cruz is a 77year old female. HPI   71yr old female presents with daughter for c/o BLE edema for the past week and notes that it has been progressively getting worse.  Since her  passed away about 3wks ago, she has bee MONITOR) w/Device Does not apply Kit 1 Device by Other route 2 (two) times daily. Use as directed. Disp: 1 kit Rfl: 0   Glucose Blood (HUNTER CONTOUR NEXT TEST) In Vitro Strip Use as directed.  Disp: 100 strip Rfl: 2   HUNTER MICROLET LANCETS Does not apply M stable  - will check bmp    4. Dysuria  - urine dip: trace blood, small leuk  - will send for UCx and tx based on Cx  - advised to hold pessary for now and f/u with urogyne for cleaning/replacement    5.  Grief reaction  - appropriate, declines xanax  - giv

## 2018-05-14 ENCOUNTER — OFFICE VISIT (OUTPATIENT)
Dept: FAMILY MEDICINE CLINIC | Facility: CLINIC | Age: 68
End: 2018-05-14

## 2018-05-14 ENCOUNTER — MED REC SCAN ONLY (OUTPATIENT)
Dept: FAMILY MEDICINE CLINIC | Facility: CLINIC | Age: 68
End: 2018-05-14

## 2018-05-14 VITALS
HEART RATE: 70 BPM | WEIGHT: 135 LBS | OXYGEN SATURATION: 96 % | BODY MASS INDEX: 24.84 KG/M2 | SYSTOLIC BLOOD PRESSURE: 112 MMHG | RESPIRATION RATE: 18 BRPM | HEIGHT: 62 IN | DIASTOLIC BLOOD PRESSURE: 60 MMHG | TEMPERATURE: 98 F

## 2018-05-14 DIAGNOSIS — R60.0 BILATERAL LOWER EXTREMITY EDEMA: Primary | ICD-10-CM

## 2018-05-14 DIAGNOSIS — E11.40 TYPE 2 DIABETES MELLITUS WITH DIABETIC NEUROPATHY, WITHOUT LONG-TERM CURRENT USE OF INSULIN (HCC): ICD-10-CM

## 2018-05-14 DIAGNOSIS — N18.4 CKD (CHRONIC KIDNEY DISEASE) STAGE 4, GFR 15-29 ML/MIN (HCC): ICD-10-CM

## 2018-05-14 DIAGNOSIS — I10 ESSENTIAL HYPERTENSION: ICD-10-CM

## 2018-05-14 PROCEDURE — 80048 BASIC METABOLIC PNL TOTAL CA: CPT | Performed by: FAMILY MEDICINE

## 2018-05-14 PROCEDURE — 99214 OFFICE O/P EST MOD 30 MIN: CPT | Performed by: FAMILY MEDICINE

## 2018-05-14 NOTE — PROGRESS NOTES
Dione Espino is a 77year old female. HPI:   77 yr old female presents for f/u on BLE edema, CKD and HTN. Has been taking lasix 20mg po daily for the past 3d. Notes improvement in BLE edema. Has increased water intake and more active now per daughter. Tab TAKE ONE TABLET BY MOUTH ONCE DAILY Disp: 30 tablet Rfl: 2   Estradiol (ESTRACE) 0.1 MG/GM Vaginal Cream Apply 1/2 gram vaginally 2 times per week.  Disp: 1 Tube Rfl: 3   SPIRONOLACTONE 25 MG Oral Tab TAKE ONE TABLET BY MOUTH ONCE DAILY Disp: 90 tablet well nourished,in no apparent distress  SKIN: no rashes,no suspicious lesions  HEENT: atraumatic, normocephalic,ears and throat are clear  NECK: supple,no adenopathy   LUNGS: clear to auscultation  CARDIO: RRR without murmur  GI: good BS's,no masses, HSM o

## 2018-05-19 DIAGNOSIS — E11.40 TYPE 2 DIABETES MELLITUS WITH DIABETIC NEUROPATHY, WITHOUT LONG-TERM CURRENT USE OF INSULIN (HCC): ICD-10-CM

## 2018-05-19 DIAGNOSIS — I10 ESSENTIAL HYPERTENSION WITH GOAL BLOOD PRESSURE LESS THAN 140/90: ICD-10-CM

## 2018-05-19 DIAGNOSIS — E11.40 TYPE 2 DIABETES MELLITUS WITH DIABETIC NEUROPATHY (HCC): ICD-10-CM

## 2018-05-21 RX ORDER — AMLODIPINE BESYLATE 5 MG/1
TABLET ORAL
Qty: 180 TABLET | Refills: 0 | Status: SHIPPED | OUTPATIENT
Start: 2018-05-21 | End: 2018-08-20

## 2018-05-21 NOTE — TELEPHONE ENCOUNTER
Hypertension Medications Protocol Failed5/19 5:30 AM   Last serum creatinine< 2.0    CMP or BMP in past 12 months    Appointment in past 6 or next 3 months     No future appointments.   LOV    LAST LAB 5/14/18    LAST RX 1/22/18 #180    PROTOCOL FAILED NEED

## 2018-05-22 RX ORDER — CARVEDILOL 6.25 MG/1
TABLET ORAL
Qty: 90 TABLET | Refills: 0 | Status: SHIPPED | OUTPATIENT
Start: 2018-05-22 | End: 2018-08-20

## 2018-05-22 RX ORDER — SPIRONOLACTONE 25 MG/1
TABLET ORAL
Qty: 90 TABLET | Refills: 0 | Status: SHIPPED | OUTPATIENT
Start: 2018-05-22 | End: 2018-07-20

## 2018-05-22 NOTE — TELEPHONE ENCOUNTER
LOV 5/18    LAST LAB 5/18    LAST RX    METFORMIN  MG Oral Tab (Discontinued) 180 tablet 0 11/7/2017   - will cont current medication, metformin 500mg po qam, unable to tolerate bid   PROTOCOL  SPIRONOLACTONE 25 MG Oral Tab 90 tablet 0 1/22/2018

## 2018-05-25 DIAGNOSIS — E11.40 TYPE 2 DIABETES MELLITUS WITH DIABETIC NEUROPATHY, WITHOUT LONG-TERM CURRENT USE OF INSULIN (HCC): ICD-10-CM

## 2018-05-29 NOTE — TELEPHONE ENCOUNTER
Requesting: metformin 500mg 1 BID  LOV: 5/14/18 HTN, CKD, DM  Type 2 diabetes mellitus with diabetic neuropathy, without long-term current use of insulin (HCC)  - check diabetic labs in the upcoming weeks  - has been controlling with diet/exercise  - monit

## 2018-05-31 DIAGNOSIS — E11.40 TYPE 2 DIABETES MELLITUS WITH DIABETIC NEUROPATHY, WITHOUT LONG-TERM CURRENT USE OF INSULIN (HCC): ICD-10-CM

## 2018-05-31 NOTE — TELEPHONE ENCOUNTER
Diabetic Medication Protocol Failed5/31 1:21 PM   HgBA1C procedure resulted in past 6 months    Last HgBA1C < 7.5    Microalbumin procedure in past 12 months or taking ACE/ARB    Appointment in past 6 or next 3 months     Last refill 11/7/17 x 90 days only

## 2018-05-31 NOTE — TELEPHONE ENCOUNTER
Patient is requesting refill on medications .    5/14/2018  Future Appointments  Date Time Provider Jaison Borjas   7/2/2018 1:30 PM Sherra Spurling, MD [facility] [place]       Telephone Information:   Home Phone [phone]   Mobile [phone]

## 2018-05-31 NOTE — TELEPHONE ENCOUNTER
Pt has elevated Cr, has not filled metformin a several months. Pls advise to obtain diabetic blood work and schedule with renal as discussed at last ov. Will adjust diabetic meds based on Cr.

## 2018-06-01 NOTE — TELEPHONE ENCOUNTER
Left detailed message for patient/daughter on phone. Future Appointments  Date Time Provider Jaison Borjas   7/2/2018 1:30 PM Kathleen Noe MD [facility] [place]     Rx denied.

## 2018-06-07 NOTE — TELEPHONE ENCOUNTER
S/w pt's daughter about how metformin may worsen kidney function, therefore will hold. Advised to obtain diabetic labs and f/u with nephrology as recommended. States she has an appt on 7/1 with nephrology. All questions answered.

## 2018-06-22 DIAGNOSIS — E78.2 MIXED HYPERLIPIDEMIA: ICD-10-CM

## 2018-06-22 DIAGNOSIS — I10 ESSENTIAL HYPERTENSION WITH GOAL BLOOD PRESSURE LESS THAN 140/90: ICD-10-CM

## 2018-06-22 RX ORDER — SPIRONOLACTONE 25 MG/1
TABLET ORAL
Qty: 90 TABLET | Refills: 0 | OUTPATIENT
Start: 2018-06-22

## 2018-06-22 RX ORDER — ATORVASTATIN CALCIUM 20 MG/1
TABLET, FILM COATED ORAL
Qty: 90 TABLET | Refills: 0 | Status: SHIPPED | OUTPATIENT
Start: 2018-06-22 | End: 2018-09-24

## 2018-06-22 NOTE — TELEPHONE ENCOUNTER
Cholesterol Medication Protocol Passed6/22 10:40 AM   ALT < 80    ALT resulted within past year    Lipid panel within past 12 months    Appointment within past 12 or next 3 months     Rx approved    Spironolactone denied, new rx sent 5/22/18 #90

## 2018-07-02 ENCOUNTER — OFFICE VISIT (OUTPATIENT)
Dept: NEPHROLOGY | Facility: CLINIC | Age: 68
End: 2018-07-02

## 2018-07-02 VITALS — DIASTOLIC BLOOD PRESSURE: 64 MMHG | WEIGHT: 138 LBS | SYSTOLIC BLOOD PRESSURE: 138 MMHG | BODY MASS INDEX: 25 KG/M2

## 2018-07-02 DIAGNOSIS — N17.9 AKI (ACUTE KIDNEY INJURY) (HCC): ICD-10-CM

## 2018-07-02 DIAGNOSIS — N18.30 CKD (CHRONIC KIDNEY DISEASE) STAGE 3, GFR 30-59 ML/MIN (HCC): Primary | ICD-10-CM

## 2018-07-02 DIAGNOSIS — R77.9 ELEVATED SERUM PROTEIN LEVEL: ICD-10-CM

## 2018-07-02 DIAGNOSIS — D63.1 ANEMIA IN STAGE 3 CHRONIC KIDNEY DISEASE (HCC): ICD-10-CM

## 2018-07-02 DIAGNOSIS — N18.30 ANEMIA IN STAGE 3 CHRONIC KIDNEY DISEASE (HCC): ICD-10-CM

## 2018-07-02 PROCEDURE — 99244 OFF/OP CNSLTJ NEW/EST MOD 40: CPT | Performed by: INTERNAL MEDICINE

## 2018-07-02 NOTE — PROGRESS NOTES
Nephrology Consult Note    REASON FOR CONSULT: CKD 3 / anemia    ASSESSMENT/PLAN:        1) CKD 3- rising serum creatinine since late 2017 from 1.0 (2016) -> 1.7 mg/d.  (10/17) -> 2.0 currently associated with new onset edema, anemia and constitutional symp changes  Denies CP or palpitations  Denies SOB/cough/hemoptysis  Denies abd or flank pain  Denies N/V/D  Denies change in urinary habits or gross hematuria  Denies LE edema  Denies skin rashes/myalgias/arthralgias    PMH:  Past Medical History:   Diagnosis History Main Topics    Smoking status: Never Smoker                                                                Smokeless tobacco: Never Used                        Alcohol use: No              Drug use:  No                 Family History:  Family Histor

## 2018-07-18 DIAGNOSIS — I10 ESSENTIAL HYPERTENSION WITH GOAL BLOOD PRESSURE LESS THAN 140/90: ICD-10-CM

## 2018-07-18 LAB
ABSOLUTE BASOPHILS: 58 CELLS/UL (ref 0–200)
ABSOLUTE EOSINOPHILS: 270 CELLS/UL (ref 15–500)
ABSOLUTE LYMPHOCYTES: 1161 CELLS/UL (ref 850–3900)
ABSOLUTE MONOCYTES: 376 CELLS/UL (ref 200–950)
ABSOLUTE NEUTROPHILS: 3434 CELLS/UL (ref 1500–7800)
ALBUMIN/GLOBULIN RATIO: 1.2 (CALC) (ref 1–2.5)
ALBUMIN: 4 G/DL (ref 3.6–5.1)
ALKALINE PHOSPHATASE: 91 U/L (ref 33–130)
ALT: 12 U/L (ref 6–29)
ANACHOICE(R) SCREEN: NEGATIVE
AST: 17 U/L (ref 10–35)
BASOPHILS: 1.1 %
BILIRUBIN, TOTAL: 0.4 MG/DL (ref 0.2–1.2)
BUN/CREATININE RATIO: 19 (CALC) (ref 6–22)
BUN: 31 MG/DL (ref 7–25)
CALCIUM: 9.5 MG/DL (ref 8.6–10.4)
CARBON DIOXIDE: 26 MMOL/L (ref 20–31)
CHLORIDE: 108 MMOL/L (ref 98–110)
COMPLEMENT COMPONENT C3C: 121 MG/DL (ref 83–193)
COMPLEMENT COMPONENT C4C: 23 MG/DL (ref 15–57)
CREATININE, RANDOM URINE: 98 MG/DL (ref 20–320)
CREATININE: 1.62 MG/DL (ref 0.6–0.93)
EGFR IF AFRICN AM: 37 ML/MIN/1.73M2
EGFR IF NONAFRICN AM: 32 ML/MIN/1.73M2
EOSINOPHILS: 5.1 %
GLOBULIN: 3.4 G/DL (CALC) (ref 1.9–3.7)
GLUCOSE: 124 MG/DL (ref 65–99)
HEMATOCRIT: 29.6 % (ref 35–45)
HEMOGLOBIN: 9.9 G/DL (ref 11.7–15.5)
LYMPHOCYTES: 21.9 %
MCH: 29.6 PG (ref 27–33)
MCHC: 33.4 G/DL (ref 32–36)
MCV: 88.4 FL (ref 80–100)
MONOCYTES: 7.1 %
MPV: 11 FL (ref 7.5–12.5)
MYELOPEROXIDASE ANTIBODY: <1 AI
NEUTROPHILS: 64.8 %
PLATELET COUNT: 244 THOUSAND/UL (ref 140–400)
POTASSIUM: 4.6 MMOL/L (ref 3.5–5.3)
PROTEIN, TOTAL, RANDOM UR: 12 MG/DL (ref 5–24)
PROTEIN, TOTAL: 7.4 G/DL (ref 6.1–8.1)
PROTEIN/CREATININE RATIO: 122 MG/G CREAT (ref 21–161)
PROTEINASE-3 ANTIBODY: <1 AI
RDW: 12.9 % (ref 11–15)
RED BLOOD CELL COUNT: 3.35 MILLION/UL (ref 3.8–5.1)
SODIUM: 141 MMOL/L (ref 135–146)
WHITE BLOOD CELL COUNT: 5.3 THOUSAND/UL (ref 3.8–10.8)

## 2018-07-20 RX ORDER — LOSARTAN POTASSIUM 100 MG/1
TABLET ORAL
Qty: 30 TABLET | Refills: 2 | Status: SHIPPED | OUTPATIENT
Start: 2018-07-20 | End: 2018-10-28

## 2018-07-20 NOTE — TELEPHONE ENCOUNTER
LOV 5/14/18    LAST LAB 7/16/18  creat. 1.62    LAST RX  3/17/18  #30  2 refills     Next OV No future appointments.     PROTOCOL- Passed  LOSARTAN 100MG TAB    Disp: 30 tablet Refills: 2    Class: Normal Start: 7/18/2018   DO Kristen  Last refill: 5/19/20

## 2018-07-23 ENCOUNTER — TELEPHONE (OUTPATIENT)
Dept: NEPHROLOGY | Facility: CLINIC | Age: 68
End: 2018-07-23

## 2018-07-23 NOTE — TELEPHONE ENCOUNTER
Left VM for pt- renal function improved- similar to 2017; all w/u unremarkable; no need for renal biopsy with bland UA- will f/u in 6 months- michelle estevez

## 2018-08-20 DIAGNOSIS — E11.40 TYPE 2 DIABETES MELLITUS WITH DIABETIC NEUROPATHY, WITHOUT LONG-TERM CURRENT USE OF INSULIN (HCC): ICD-10-CM

## 2018-08-20 DIAGNOSIS — I10 ESSENTIAL HYPERTENSION WITH GOAL BLOOD PRESSURE LESS THAN 140/90: ICD-10-CM

## 2018-08-21 RX ORDER — AMLODIPINE BESYLATE 5 MG/1
TABLET ORAL
Qty: 180 TABLET | Refills: 0 | Status: SHIPPED | OUTPATIENT
Start: 2018-08-21 | End: 2019-01-29

## 2018-08-21 RX ORDER — CARVEDILOL 6.25 MG/1
TABLET ORAL
Qty: 90 TABLET | Refills: 0 | Status: SHIPPED | OUTPATIENT
Start: 2018-08-21 | End: 2019-01-29

## 2018-08-21 NOTE — TELEPHONE ENCOUNTER
CARVEDILOL 6.25MG TAB  Will file in chart as: CARVEDILOL 6.25 MG Oral Tab  TAKE 1 TABLET BY MOUTH ONCE DAILY       Disp: 90 tablet Refills: 0    Class: Normal Start: 8/20/2018   For: Type 2 diabetes mellitus with diabetic neuropathy, without long-term curr

## 2018-09-24 DIAGNOSIS — E78.2 MIXED HYPERLIPIDEMIA: ICD-10-CM

## 2018-09-24 RX ORDER — ATORVASTATIN CALCIUM 20 MG/1
TABLET, FILM COATED ORAL
Qty: 90 TABLET | Refills: 0 | Status: SHIPPED | OUTPATIENT
Start: 2018-09-24 | End: 2019-03-23

## 2018-09-24 NOTE — TELEPHONE ENCOUNTER
Name from pharmacy: ATORVASTATIN 20MG   TAB          Will file in chart as: ATORVASTATIN 20 MG Oral Tab    Sig: TAKE 1 TABLET BY MOUTH AT BEDTIME    Disp:  90 tablet    Refills:  0    Start: 9/24/2018     Class: Normal    For: Mixed hyperlipidemia    Reque

## 2018-10-28 DIAGNOSIS — I10 ESSENTIAL HYPERTENSION WITH GOAL BLOOD PRESSURE LESS THAN 140/90: ICD-10-CM

## 2018-10-29 RX ORDER — LOSARTAN POTASSIUM 100 MG/1
TABLET ORAL
Qty: 30 TABLET | Refills: 2 | Status: SHIPPED | OUTPATIENT
Start: 2018-10-29 | End: 2019-01-28

## 2018-10-29 NOTE — TELEPHONE ENCOUNTER
Name from pharmacy: LOSARTAN 100MG   TAB          Will file in chart as: LOSARTAN 100 MG Oral Tab    Sig: TAKE 1 TABLET BY MOUTH ONCE DAILY    Disp:  30 tablet    Refills:  2    Start: 10/28/2018     Class: Normal    For: Essential hypertension with goal b

## 2018-11-29 DIAGNOSIS — E11.40 TYPE 2 DIABETES MELLITUS WITH DIABETIC NEUROPATHY, WITHOUT LONG-TERM CURRENT USE OF INSULIN (HCC): ICD-10-CM

## 2018-11-29 DIAGNOSIS — I10 ESSENTIAL HYPERTENSION WITH GOAL BLOOD PRESSURE LESS THAN 140/90: ICD-10-CM

## 2018-11-30 RX ORDER — CARVEDILOL 6.25 MG/1
TABLET ORAL
Qty: 90 TABLET | Refills: 0 | OUTPATIENT
Start: 2018-11-30

## 2018-11-30 RX ORDER — AMLODIPINE BESYLATE 5 MG/1
TABLET ORAL
Qty: 180 TABLET | Refills: 0 | OUTPATIENT
Start: 2018-11-30

## 2018-11-30 NOTE — TELEPHONE ENCOUNTER
LOV 5/18 The patient is asked to return in 2-4wks for DM2 follow-up    LAST LAB    LAST RX   CARVEDILOL 6.25 MG Oral Tab 90 tablet 0 8/21/2018          Next OV Visit date not found    PROTOCOL    Hypertension Medications Protocol [id number] 11:27 AM   A

## 2018-12-11 ENCOUNTER — TELEPHONE (OUTPATIENT)
Dept: FAMILY MEDICINE CLINIC | Facility: CLINIC | Age: 68
End: 2018-12-11

## 2018-12-11 NOTE — TELEPHONE ENCOUNTER
Former patient of Dr. Alfred Guevara. Daughter of patient stated she saw Dr. Alfred Guevara out of the office the other day. Tried to explain that I cannot schedule an Appt for January until the daughter provides us with the new INS. Currently Out of Network INS.     Sh

## 2018-12-13 NOTE — TELEPHONE ENCOUNTER
Daughter called again. She would like to talk to Dr. Ayana Guerrero about her mother's issue that they spoke about before. I was reading the message from Suzanne Reynoso and then she hung up.

## 2019-01-21 ENCOUNTER — OFFICE VISIT (OUTPATIENT)
Dept: FAMILY MEDICINE CLINIC | Facility: CLINIC | Age: 69
End: 2019-01-21
Payer: MEDICAID

## 2019-01-21 VITALS
RESPIRATION RATE: 18 BRPM | HEIGHT: 62 IN | SYSTOLIC BLOOD PRESSURE: 120 MMHG | OXYGEN SATURATION: 98 % | DIASTOLIC BLOOD PRESSURE: 64 MMHG | WEIGHT: 147 LBS | TEMPERATURE: 98 F | HEART RATE: 68 BPM | BODY MASS INDEX: 27.05 KG/M2

## 2019-01-21 DIAGNOSIS — M54.12 CERVICAL RADICULOPATHY: Primary | ICD-10-CM

## 2019-01-21 DIAGNOSIS — R60.0 BILATERAL LOWER EXTREMITY EDEMA: ICD-10-CM

## 2019-01-21 DIAGNOSIS — R10.13 EPIGASTRIC PAIN: ICD-10-CM

## 2019-01-21 DIAGNOSIS — G89.29 CHRONIC LEFT SHOULDER PAIN: ICD-10-CM

## 2019-01-21 DIAGNOSIS — E11.40 TYPE 2 DIABETES MELLITUS WITH DIABETIC NEUROPATHY, WITHOUT LONG-TERM CURRENT USE OF INSULIN (HCC): ICD-10-CM

## 2019-01-21 DIAGNOSIS — M25.512 CHRONIC LEFT SHOULDER PAIN: ICD-10-CM

## 2019-01-21 DIAGNOSIS — R41.3 MEMORY CHANGES: ICD-10-CM

## 2019-01-21 PROCEDURE — 99214 OFFICE O/P EST MOD 30 MIN: CPT | Performed by: FAMILY MEDICINE

## 2019-01-21 RX ORDER — GLIMEPIRIDE 4 MG/1
4 TABLET ORAL
COMMUNITY
End: 2019-03-05

## 2019-01-21 RX ORDER — METAXALONE 800 MG/1
400 TABLET ORAL NIGHTLY PRN
Qty: 30 TABLET | Refills: 0 | Status: SHIPPED | OUTPATIENT
Start: 2019-01-21 | End: 2019-01-29

## 2019-01-21 NOTE — PROGRESS NOTES
HPI:   Jaison Chamberlain is a 77year old female who is here for multiple complaints. C/o neck pain radiating to he L arm for the past 2-3mo along with L shoulder pain. Limited range of motion of neck and shoulder due to pain/discomfort.  Pain is described as Type II or unspecified type diabetes mellitus without mention of complication, not stated as uncontrolled    • Unspecified essential hypertension        Past Surgical History:   Procedure Laterality Date   • CATARACT     • CHOLECYSTECTOMY         Family Hi therapy once imaging completed and reviewed     3.  Type 2 diabetes mellitus with diabetic neuropathy, without long-term current use of insulin (HCC)  - check labs  - cpm  - check blood glucose levels and feet daily  - obtain eye exam  - low carb diet and e

## 2019-01-23 ENCOUNTER — HOSPITAL ENCOUNTER (OUTPATIENT)
Dept: GENERAL RADIOLOGY | Age: 69
Discharge: HOME | End: 2019-01-23
Attending: FAMILY MEDICINE
Payer: MEDICAID

## 2019-01-23 ENCOUNTER — APPOINTMENT (OUTPATIENT)
Dept: GENERAL RADIOLOGY | Age: 69
End: 2019-01-23
Attending: FAMILY MEDICINE
Payer: MEDICAID

## 2019-01-23 ENCOUNTER — APPOINTMENT (OUTPATIENT)
Dept: LAB | Age: 69
End: 2019-01-23
Attending: FAMILY MEDICINE
Payer: MEDICAID

## 2019-01-23 DIAGNOSIS — G89.29 CHRONIC LEFT SHOULDER PAIN: ICD-10-CM

## 2019-01-23 DIAGNOSIS — R41.3 MEMORY CHANGES: ICD-10-CM

## 2019-01-23 DIAGNOSIS — R10.13 EPIGASTRIC PAIN: ICD-10-CM

## 2019-01-23 DIAGNOSIS — M25.512 CHRONIC LEFT SHOULDER PAIN: ICD-10-CM

## 2019-01-23 DIAGNOSIS — M54.12 CERVICAL RADICULOPATHY: ICD-10-CM

## 2019-01-23 DIAGNOSIS — E11.40 TYPE 2 DIABETES MELLITUS WITH DIABETIC NEUROPATHY, WITHOUT LONG-TERM CURRENT USE OF INSULIN (HCC): ICD-10-CM

## 2019-01-23 LAB
ALBUMIN SERPL-MCNC: 3.9 G/DL (ref 3.1–4.5)
ALBUMIN/GLOB SERPL: 0.8 {RATIO} (ref 1–2)
ALP LIVER SERPL-CCNC: 105 U/L (ref 55–142)
ALT SERPL-CCNC: 21 U/L (ref 14–54)
ANION GAP SERPL CALC-SCNC: 6 MMOL/L (ref 0–18)
AST SERPL-CCNC: 19 U/L (ref 15–41)
BILIRUB SERPL-MCNC: 0.5 MG/DL (ref 0.1–2)
BUN BLD-MCNC: 32 MG/DL (ref 8–20)
BUN/CREAT SERPL: 18.5 (ref 10–20)
CALCIUM BLD-MCNC: 9.2 MG/DL (ref 8.3–10.3)
CHLORIDE SERPL-SCNC: 105 MMOL/L (ref 101–111)
CHOLEST SMN-MCNC: 130 MG/DL (ref ?–200)
CO2 SERPL-SCNC: 29 MMOL/L (ref 22–32)
CREAT BLD-MCNC: 1.73 MG/DL (ref 0.55–1.02)
EST. AVERAGE GLUCOSE BLD GHB EST-MCNC: 166 MG/DL (ref 68–126)
GLOBULIN PLAS-MCNC: 4.7 G/DL (ref 2.8–4.4)
GLUCOSE BLD-MCNC: 119 MG/DL (ref 70–99)
HAV AB SERPL IA-ACNC: 261 PG/ML (ref 193–986)
HBA1C MFR BLD HPLC: 7.4 % (ref ?–5.7)
HDLC SERPL-MCNC: 50 MG/DL (ref 40–59)
LDLC SERPL CALC-MCNC: 56 MG/DL (ref ?–100)
M PROTEIN MFR SERPL ELPH: 8.6 G/DL (ref 6.4–8.2)
NONHDLC SERPL-MCNC: 80 MG/DL (ref ?–130)
OSMOLALITY SERPL CALC.SUM OF ELEC: 298 MOSM/KG (ref 275–295)
POTASSIUM SERPL-SCNC: 4.2 MMOL/L (ref 3.6–5.1)
SODIUM SERPL-SCNC: 140 MMOL/L (ref 136–144)
TRIGL SERPL-MCNC: 120 MG/DL (ref 30–149)
VIT D+METAB SERPL-MCNC: 28 NG/ML (ref 30–100)
VLDLC SERPL CALC-MCNC: 24 MG/DL (ref 0–30)

## 2019-01-23 PROCEDURE — 80061 LIPID PANEL: CPT

## 2019-01-23 PROCEDURE — 82306 VITAMIN D 25 HYDROXY: CPT

## 2019-01-23 PROCEDURE — 72050 X-RAY EXAM NECK SPINE 4/5VWS: CPT | Performed by: FAMILY MEDICINE

## 2019-01-23 PROCEDURE — 80053 COMPREHEN METABOLIC PANEL: CPT

## 2019-01-23 PROCEDURE — 82607 VITAMIN B-12: CPT

## 2019-01-23 PROCEDURE — 36415 COLL VENOUS BLD VENIPUNCTURE: CPT

## 2019-01-23 PROCEDURE — 83036 HEMOGLOBIN GLYCOSYLATED A1C: CPT

## 2019-01-23 PROCEDURE — 73030 X-RAY EXAM OF SHOULDER: CPT | Performed by: FAMILY MEDICINE

## 2019-01-23 PROCEDURE — 83013 H PYLORI (C-13) BREATH: CPT

## 2019-01-24 LAB — H. PYLORI BREATH TEST: NEGATIVE

## 2019-01-26 ENCOUNTER — TELEPHONE (OUTPATIENT)
Dept: FAMILY MEDICINE CLINIC | Facility: CLINIC | Age: 69
End: 2019-01-26

## 2019-01-26 NOTE — TELEPHONE ENCOUNTER
Savannah Tatum from the Pharmacy called to say she requested multiple times for  to change the [facility]" script. Said not covered by insurance - due to formulary.

## 2019-01-28 DIAGNOSIS — I10 ESSENTIAL HYPERTENSION WITH GOAL BLOOD PRESSURE LESS THAN 140/90: ICD-10-CM

## 2019-01-28 NOTE — TELEPHONE ENCOUNTER
Dr. Kaye Lewis,  Please Advise    Rx not on [facility]. Substitute or try PA?

## 2019-01-29 ENCOUNTER — TELEPHONE (OUTPATIENT)
Dept: FAMILY MEDICINE CLINIC | Facility: CLINIC | Age: 69
End: 2019-01-29

## 2019-01-29 DIAGNOSIS — E78.2 MIXED HYPERLIPIDEMIA: ICD-10-CM

## 2019-01-29 DIAGNOSIS — E11.40 TYPE 2 DIABETES MELLITUS WITH DIABETIC NEUROPATHY, WITHOUT LONG-TERM CURRENT USE OF INSULIN (HCC): ICD-10-CM

## 2019-01-29 DIAGNOSIS — I10 ESSENTIAL HYPERTENSION WITH GOAL BLOOD PRESSURE LESS THAN 140/90: ICD-10-CM

## 2019-01-29 DIAGNOSIS — R60.0 BILATERAL LOWER EXTREMITY EDEMA: ICD-10-CM

## 2019-01-29 RX ORDER — ATORVASTATIN CALCIUM 20 MG/1
20 TABLET, FILM COATED ORAL
Qty: 90 TABLET | Refills: 0 | Status: CANCELLED | OUTPATIENT
Start: 2019-01-29

## 2019-01-29 RX ORDER — AMLODIPINE BESYLATE 5 MG/1
5 TABLET ORAL 2 TIMES DAILY
Qty: 180 TABLET | Refills: 0 | Status: CANCELLED | OUTPATIENT
Start: 2019-01-29

## 2019-01-29 RX ORDER — GLIMEPIRIDE 4 MG/1
4 TABLET ORAL
Refills: 0 | Status: CANCELLED | OUTPATIENT
Start: 2019-01-29

## 2019-01-29 RX ORDER — CYCLOBENZAPRINE HCL 5 MG
5 TABLET ORAL NIGHTLY PRN
Qty: 30 TABLET | Refills: 0 | Status: SHIPPED | OUTPATIENT
Start: 2019-01-29 | End: 2019-02-28

## 2019-01-29 RX ORDER — LOSARTAN POTASSIUM 100 MG/1
TABLET ORAL
Qty: 30 TABLET | Refills: 2 | Status: SHIPPED | OUTPATIENT
Start: 2019-01-29 | End: 2019-03-05 | Stop reason: ALTCHOICE

## 2019-01-29 RX ORDER — CARVEDILOL 6.25 MG/1
6.25 TABLET ORAL
Qty: 90 TABLET | Refills: 0 | Status: CANCELLED | OUTPATIENT
Start: 2019-01-29

## 2019-01-29 NOTE — TELEPHONE ENCOUNTER
Name from pharmacy: LOSARTAN 100MG   TAB         Will file in chart as: LOSARTAN 100 MG Oral Tab    Sig: TAKE 1 TABLET BY MOUTH ONCE DAILY    Disp:  30 tablet    Refills:  2    Start: 1/28/2019    Class: Normal    For: Essential hypertension with goal blo

## 2019-01-29 NOTE — TELEPHONE ENCOUNTER
Numerous Medication Questions. Refills and new medication not covered by INS which was prescribed last week at Appt. Transferred daughter to Triage .

## 2019-01-30 RX ORDER — FUROSEMIDE 20 MG/1
TABLET ORAL
Qty: 30 TABLET | Refills: 0 | Status: SHIPPED | OUTPATIENT
Start: 2019-01-30 | End: 2019-02-14

## 2019-01-30 RX ORDER — CARVEDILOL 6.25 MG/1
TABLET ORAL
Qty: 30 TABLET | Refills: 0 | Status: SHIPPED | OUTPATIENT
Start: 2019-01-30 | End: 2019-03-05

## 2019-01-30 RX ORDER — AMLODIPINE BESYLATE 5 MG/1
TABLET ORAL
Qty: 60 TABLET | Refills: 0 | Status: SHIPPED | OUTPATIENT
Start: 2019-01-30 | End: 2019-03-05

## 2019-01-30 NOTE — TELEPHONE ENCOUNTER
Name from pharmacy: CARVEDILOL 6.25MG   TAB          Will file in chart as: CARVEDILOL 6.25 MG Oral Tab    Sig: TAKE 1 TABLET BY MOUTH ONCE DAILY    Disp:  90 tablet    Refills:  0    Start: 1/29/2019    Class: Normal    For: Type 2 diabetes mellitus with

## 2019-01-30 NOTE — TELEPHONE ENCOUNTER
Approved x 30 days  PLease contact daughter to schedule a F/U per Dr Marden Hatchet notes    Notes recorded by Lindasy Briceño DO on 1/29/2019 at 4:30 PM CST   Pt to f/u in office to review DM2 management and labs     No future appointments.

## 2019-02-04 ENCOUNTER — TELEPHONE (OUTPATIENT)
Dept: FAMILY MEDICINE CLINIC | Facility: CLINIC | Age: 69
End: 2019-02-04

## 2019-02-04 ENCOUNTER — OFFICE VISIT (OUTPATIENT)
Dept: FAMILY MEDICINE CLINIC | Facility: CLINIC | Age: 69
End: 2019-02-04
Payer: MEDICAID

## 2019-02-04 VITALS
TEMPERATURE: 98 F | WEIGHT: 148 LBS | HEIGHT: 62 IN | HEART RATE: 68 BPM | DIASTOLIC BLOOD PRESSURE: 62 MMHG | BODY MASS INDEX: 27.23 KG/M2 | OXYGEN SATURATION: 98 % | RESPIRATION RATE: 18 BRPM | SYSTOLIC BLOOD PRESSURE: 118 MMHG

## 2019-02-04 DIAGNOSIS — K21.9 GASTROESOPHAGEAL REFLUX DISEASE, ESOPHAGITIS PRESENCE NOT SPECIFIED: ICD-10-CM

## 2019-02-04 DIAGNOSIS — E11.40 TYPE 2 DIABETES MELLITUS WITH DIABETIC NEUROPATHY, WITHOUT LONG-TERM CURRENT USE OF INSULIN (HCC): Primary | ICD-10-CM

## 2019-02-04 DIAGNOSIS — Z23 NEED FOR VACCINATION: ICD-10-CM

## 2019-02-04 DIAGNOSIS — E53.8 VITAMIN B12 DEFICIENCY: ICD-10-CM

## 2019-02-04 DIAGNOSIS — E78.2 MIXED HYPERLIPIDEMIA: ICD-10-CM

## 2019-02-04 DIAGNOSIS — I10 ESSENTIAL HYPERTENSION WITH GOAL BLOOD PRESSURE LESS THAN 140/90: ICD-10-CM

## 2019-02-04 DIAGNOSIS — E55.9 VITAMIN D INSUFFICIENCY: ICD-10-CM

## 2019-02-04 DIAGNOSIS — N18.4 CKD (CHRONIC KIDNEY DISEASE) STAGE 4, GFR 15-29 ML/MIN (HCC): ICD-10-CM

## 2019-02-04 DIAGNOSIS — Z12.11 ENCOUNTER FOR SCREENING COLONOSCOPY: ICD-10-CM

## 2019-02-04 PROCEDURE — 96372 THER/PROPH/DIAG INJ SC/IM: CPT | Performed by: FAMILY MEDICINE

## 2019-02-04 PROCEDURE — 90471 IMMUNIZATION ADMIN: CPT | Performed by: FAMILY MEDICINE

## 2019-02-04 PROCEDURE — 99214 OFFICE O/P EST MOD 30 MIN: CPT | Performed by: FAMILY MEDICINE

## 2019-02-04 PROCEDURE — 90732 PPSV23 VACC 2 YRS+ SUBQ/IM: CPT | Performed by: FAMILY MEDICINE

## 2019-02-04 RX ORDER — GLIMEPIRIDE 1 MG/1
1 TABLET ORAL
Qty: 90 TABLET | Refills: 0 | Status: SHIPPED | OUTPATIENT
Start: 2019-02-04 | End: 2019-02-15

## 2019-02-04 RX ORDER — CYANOCOBALAMIN 1000 UG/ML
1000 INJECTION INTRAMUSCULAR; SUBCUTANEOUS
Status: SHIPPED | OUTPATIENT
Start: 2019-02-04 | End: 2019-03-04

## 2019-02-04 RX ADMIN — CYANOCOBALAMIN 1000 MCG: 1000 INJECTION INTRAMUSCULAR; SUBCUTANEOUS at 10:00:00

## 2019-02-04 NOTE — TELEPHONE ENCOUNTER
Future Appointments   Date Time Provider Jaison Borjas   2/11/2019 10:30 AM [facility] NURSE [facility]     Please place order for B-12

## 2019-02-04 NOTE — PROGRESS NOTES
HPI:   Candy Johnson is a 68year old female who presents for recheck of her chronic conditions and f/u after recent labs. DM2, has been taking medication as directed, Glimepiride 4mg po qam. Patient’s FBS have been <120s.  Last visit with ophthalmologist 166 (H) 01/23/2019     (H) 11/03/2017     10/03/2016     Lab Results   Component Value Date    CHOLEST 130 01/23/2019    CHOLEST 94 11/03/2017    CHOLEST 112 10/04/2016     Lab Results   Component Value Date    HDL 50 01/23/2019    HDL 49 11/ Not on file    Social History Narrative      Not on file      Smoking status: Never Smoker   Smokeless tobacco: Never Used       Alcohol use No        REVIEW OF SYSTEMS:   GENERAL HEALTH: feels well otherwise  SKIN: denies any unusual skin lesions or r indications, dosing and SEs  - monitor    6. Vitamin D insufficiency  - vit d: 28  - start vit d3 1000u daily otc    7.  Vitamin B12 deficiency  - vit b12: 261  - start vit b12 1000mcg injections weekly x 1mo, then monthly x 3mo, and then pt to take vit b12

## 2019-02-12 ENCOUNTER — NURSE ONLY (OUTPATIENT)
Dept: FAMILY MEDICINE CLINIC | Facility: CLINIC | Age: 69
End: 2019-02-12
Payer: MEDICAID

## 2019-02-12 PROCEDURE — 96372 THER/PROPH/DIAG INJ SC/IM: CPT | Performed by: FAMILY MEDICINE

## 2019-02-12 RX ADMIN — CYANOCOBALAMIN 1000 MCG: 1000 INJECTION INTRAMUSCULAR; SUBCUTANEOUS at 15:19:00

## 2019-02-14 DIAGNOSIS — R60.0 BILATERAL LOWER EXTREMITY EDEMA: ICD-10-CM

## 2019-02-14 RX ORDER — FUROSEMIDE 20 MG/1
TABLET ORAL
Qty: 90 TABLET | Refills: 0 | Status: SHIPPED | OUTPATIENT
Start: 2019-02-14 | End: 2019-03-11

## 2019-02-14 NOTE — TELEPHONE ENCOUNTER
Name from pharmacy: FUROSEMIDE 20MG     TAB          Will file in chart as: FUROSEMIDE 20 MG Oral Tab    Sig: TAKE 1 TABLET BY MOUTH TWICE DAILY    Disp:  30 tablet    Refills:  0    Start: 2/14/2019    Class: Normal    For: Bilateral lower extremity edema

## 2019-02-15 DIAGNOSIS — E11.40 TYPE 2 DIABETES MELLITUS WITH DIABETIC NEUROPATHY, WITHOUT LONG-TERM CURRENT USE OF INSULIN (HCC): ICD-10-CM

## 2019-02-15 RX ORDER — GLIMEPIRIDE 2 MG/1
2 TABLET ORAL
Qty: 30 TABLET | Refills: 0 | Status: SHIPPED | OUTPATIENT
Start: 2019-02-15 | End: 2019-02-16

## 2019-02-15 NOTE — TELEPHONE ENCOUNTER
Pt's daughter was transferred into Triage voicemail.  In reference to the dosage on the \"Glimepiride\"

## 2019-02-15 NOTE — TELEPHONE ENCOUNTER
Patient needs Glimepiride 2 mg per daughter. will increase glimepiride 5mg po qam, rx for glimepiride 1mg sent to pharmacy, reviewed indications, dosing and SEs    Rx sent.

## 2019-02-16 ENCOUNTER — TELEPHONE (OUTPATIENT)
Dept: FAMILY MEDICINE CLINIC | Facility: CLINIC | Age: 69
End: 2019-02-16

## 2019-02-16 RX ORDER — GLIMEPIRIDE 4 MG/1
4 TABLET ORAL
Qty: 90 TABLET | Refills: 0 | Status: SHIPPED | OUTPATIENT
Start: 2019-02-16 | End: 2019-03-23

## 2019-02-16 NOTE — TELEPHONE ENCOUNTER
1. Type 2 diabetes mellitus with diabetic neuropathy, without long-term current use of insulin (HCC)  - a1c 7.4, fbs 119  - will increase glimepiride 5mg po qam, rx for glimepiride 1mg sent to pharmacy, reviewed indications, dosing and SEs  - lose wgt with

## 2019-02-16 NOTE — TELEPHONE ENCOUNTER
glimepiride 2 MG Oral Tab 30 tablet 0 2/15/2019    Sig :  Take 1 tablet (2 mg total) by mouth daily with breakfast.     Route:   Oral     Order #:   [id number]       Sent over 2 different strengths yesterday.     Pharmacy stated they need to speak with a Hosea Flores

## 2019-02-17 RX ORDER — GLIMEPIRIDE 1 MG/1
1 TABLET ORAL
Qty: 30 TABLET | Refills: 0 | Status: SHIPPED | OUTPATIENT
Start: 2019-02-17 | End: 2019-03-19

## 2019-02-21 ENCOUNTER — PATIENT OUTREACH (OUTPATIENT)
Dept: FAMILY MEDICINE CLINIC | Facility: CLINIC | Age: 69
End: 2019-02-21

## 2019-02-21 NOTE — PROGRESS NOTES
LM message for patient reminding importance of regular diabetic eye check ups. Referral was mail to patient as well .

## 2019-03-05 ENCOUNTER — TELEPHONE (OUTPATIENT)
Dept: FAMILY MEDICINE CLINIC | Facility: CLINIC | Age: 69
End: 2019-03-05

## 2019-03-05 DIAGNOSIS — I10 ESSENTIAL HYPERTENSION WITH GOAL BLOOD PRESSURE LESS THAN 140/90: ICD-10-CM

## 2019-03-05 DIAGNOSIS — E11.40 TYPE 2 DIABETES MELLITUS WITH DIABETIC NEUROPATHY, WITHOUT LONG-TERM CURRENT USE OF INSULIN (HCC): ICD-10-CM

## 2019-03-05 RX ORDER — TELMISARTAN 80 MG/1
80 TABLET ORAL DAILY
Qty: 30 TABLET | Refills: 0 | Status: SHIPPED | OUTPATIENT
Start: 2019-03-05 | End: 2019-03-08

## 2019-03-05 NOTE — TELEPHONE ENCOUNTER
Received fax from [employer]. Patient's Losartan is recalled. Alternate BP med?     Dr. Meghana Spence, Please advise

## 2019-03-06 RX ORDER — CARVEDILOL 6.25 MG/1
TABLET ORAL
Qty: 30 TABLET | Refills: 0 | Status: SHIPPED | OUTPATIENT
Start: 2019-03-06 | End: 2019-03-23

## 2019-03-06 RX ORDER — AMLODIPINE BESYLATE 5 MG/1
TABLET ORAL
Qty: 60 TABLET | Refills: 0 | Status: SHIPPED | OUTPATIENT
Start: 2019-03-06 | End: 2019-03-23

## 2019-03-06 NOTE — TELEPHONE ENCOUNTER
Dr. Jamal Redd,  Please advise    Per Pharmacy, telmisartan not covered. Patient is [employer].       Irbesartan and Valsartan are covered

## 2019-03-06 NOTE — TELEPHONE ENCOUNTER
LOV 2/19    LAST LAB 1/19    LAST RX  CARVEDILOL 6.25 MG Oral Tab 30 tablet 0 1/30/2019    Sig :  TAKE 1 TABLET BY MOUTH ONCE DAILY       AMLODIPINE BESYLATE 5 MG Oral Tab 60 tablet 0 1/30/2019       Next OV 3/23/2019      PROTOCOL  Hypertension Medication

## 2019-03-06 NOTE — TELEPHONE ENCOUNTER
Called and spoke with patient's daughter. Informed patient's BP med, Losartan, is part of a recall. Medication has been changed to Telmisartan.   Informed order has been sent to Pharmacy and that Dr. Bret Camejo would like the patient to be seen for a follow u

## 2019-03-07 NOTE — TELEPHONE ENCOUNTER
Pls switch to irbesartan 150mg po daily, #30, no refills.  Pt has appt on 3/23, will check bp at that time

## 2019-03-08 RX ORDER — IRBESARTAN 150 MG/1
150 TABLET ORAL NIGHTLY
Qty: 30 TABLET | Refills: 0 | Status: SHIPPED | OUTPATIENT
Start: 2019-03-08 | End: 2019-03-23 | Stop reason: ALTCHOICE

## 2019-03-11 DIAGNOSIS — R60.0 BILATERAL LOWER EXTREMITY EDEMA: ICD-10-CM

## 2019-03-11 RX ORDER — FUROSEMIDE 20 MG/1
20 TABLET ORAL 2 TIMES DAILY
Qty: 90 TABLET | Refills: 0 | Status: SHIPPED | OUTPATIENT
Start: 2019-03-11 | End: 2019-03-23

## 2019-03-11 NOTE — TELEPHONE ENCOUNTER
furosemide 20 MG Oral Tab         Sig: Take 1 tablet (20 mg total) by mouth 2 (two) times daily.     Disp:  90 tablet    Refills:  0    Start: 3/11/2019    Class: Normal    Non-formulary For: Bilateral lower extremity edema    To pharmacy: Please consider

## 2019-03-23 ENCOUNTER — OFFICE VISIT (OUTPATIENT)
Dept: FAMILY MEDICINE CLINIC | Facility: CLINIC | Age: 69
End: 2019-03-23
Payer: MEDICAID

## 2019-03-23 VITALS
RESPIRATION RATE: 18 BRPM | OXYGEN SATURATION: 98 % | HEIGHT: 62 IN | SYSTOLIC BLOOD PRESSURE: 122 MMHG | DIASTOLIC BLOOD PRESSURE: 68 MMHG | TEMPERATURE: 98 F | BODY MASS INDEX: 27.05 KG/M2 | HEART RATE: 70 BPM | WEIGHT: 147 LBS

## 2019-03-23 DIAGNOSIS — N18.30 CKD (CHRONIC KIDNEY DISEASE) STAGE 3, GFR 30-59 ML/MIN (HCC): ICD-10-CM

## 2019-03-23 DIAGNOSIS — Z00.00 ROUTINE GENERAL MEDICAL EXAMINATION AT A HEALTH CARE FACILITY: Primary | ICD-10-CM

## 2019-03-23 DIAGNOSIS — M25.512 CHRONIC LEFT SHOULDER PAIN: ICD-10-CM

## 2019-03-23 DIAGNOSIS — M50.30 DDD (DEGENERATIVE DISC DISEASE), CERVICAL: ICD-10-CM

## 2019-03-23 DIAGNOSIS — R60.0 BILATERAL LOWER EXTREMITY EDEMA: ICD-10-CM

## 2019-03-23 DIAGNOSIS — I10 ESSENTIAL HYPERTENSION WITH GOAL BLOOD PRESSURE LESS THAN 140/90: ICD-10-CM

## 2019-03-23 DIAGNOSIS — E78.2 MIXED HYPERLIPIDEMIA: ICD-10-CM

## 2019-03-23 DIAGNOSIS — G89.29 CHRONIC LEFT SHOULDER PAIN: ICD-10-CM

## 2019-03-23 DIAGNOSIS — M54.12 CERVICAL RADICULOPATHY: ICD-10-CM

## 2019-03-23 DIAGNOSIS — E11.40 TYPE 2 DIABETES MELLITUS WITH DIABETIC NEUROPATHY, WITHOUT LONG-TERM CURRENT USE OF INSULIN (HCC): ICD-10-CM

## 2019-03-23 PROCEDURE — 99397 PER PM REEVAL EST PAT 65+ YR: CPT | Performed by: FAMILY MEDICINE

## 2019-03-23 RX ORDER — LOSARTAN POTASSIUM 100 MG/1
TABLET ORAL
Refills: 1 | COMMUNITY
Start: 2019-03-05 | End: 2019-05-20

## 2019-03-23 RX ORDER — GLIMEPIRIDE 4 MG/1
4 TABLET ORAL
Qty: 90 TABLET | Refills: 0 | Status: SHIPPED | OUTPATIENT
Start: 2019-03-23 | End: 2019-06-24

## 2019-03-23 RX ORDER — AMLODIPINE BESYLATE 5 MG/1
5 TABLET ORAL 2 TIMES DAILY
Qty: 60 TABLET | Refills: 2 | Status: SHIPPED | OUTPATIENT
Start: 2019-03-23 | End: 2019-06-24

## 2019-03-23 RX ORDER — CARVEDILOL 6.25 MG/1
6.25 TABLET ORAL
Qty: 30 TABLET | Refills: 2 | Status: SHIPPED | OUTPATIENT
Start: 2019-03-23 | End: 2019-06-24

## 2019-03-23 RX ORDER — CARVEDILOL 6.25 MG/1
6.25 TABLET ORAL
Qty: 30 TABLET | Refills: 2 | Status: CANCELLED | OUTPATIENT
Start: 2019-03-23

## 2019-03-23 RX ORDER — FUROSEMIDE 20 MG/1
20 TABLET ORAL 2 TIMES DAILY
Qty: 60 TABLET | Refills: 2 | Status: SHIPPED | OUTPATIENT
Start: 2019-03-23 | End: 2019-06-24

## 2019-03-23 RX ORDER — ATORVASTATIN CALCIUM 20 MG/1
TABLET, FILM COATED ORAL
Qty: 90 TABLET | Refills: 1 | Status: SHIPPED | OUTPATIENT
Start: 2019-03-23 | End: 2019-12-23

## 2019-03-23 RX ORDER — ATORVASTATIN CALCIUM 20 MG/1
TABLET, FILM COATED ORAL
Qty: 90 TABLET | Refills: 0 | Status: CANCELLED | OUTPATIENT
Start: 2019-03-23

## 2019-03-23 RX ORDER — GLIMEPIRIDE 4 MG/1
4 TABLET ORAL
Qty: 90 TABLET | Refills: 0 | Status: CANCELLED | OUTPATIENT
Start: 2019-03-23

## 2019-03-23 NOTE — PROGRESS NOTES
HPI:   Chandler Moreno is a 68year old female who presents for recheck of her diabetes. Patient’s FBS have been ***. Last visit with ophthalmologist was ***. Pt {HAS:40328} been checking her feet on a regular basis.  Pt {DENIES:66607} any tingling of the fe 16 11/03/2017     Lab Results   Component Value Date    ALT 21 01/23/2019    ALT 12 07/16/2018    ALT 21 11/03/2017     @EDWBRIEFLAB(    )  Past Medical History:   Diagnosis Date   • Cataract    • Type II or unspecified type diabetes mellitus without menti Not on file    Other Topics      Concerns:        Not on file    Social History Narrative      Not on file      Smoking status: Never Smoker   Smokeless tobacco: Never Used       Alcohol use No       Exercise: {MPNEXERCISE:94682::\"none\"}.   Diet: {MPNDIET

## 2019-03-23 NOTE — PROGRESS NOTES
Patient presents with:  Physical      HPI:  70yr old female with HTN, CKD, DM2, and HL presents for routine adult physical.   Last mammogram about 6mo ago at [facility] per pt and daughter. DM2, taking medications as directed.  Daughter notes that she has been t complication, not stated as uncontrolled     Type 2 diabetes mellitus with diabetic neuropathy (HCC)     Anemia     Elevated troponin     Benign essential HTN     CKD (chronic kidney disease) stage 4, GFR 15-29 ml/min (HCC)      Past Medical History:   Tiff EOM are normal. PERRLA. Neck: Normal range of motion. Neck supple. Cardiovascular: Normal rate, regular rhythm and intact distal pulses. No murmur, rubs or gallops.  +BLE edema 1-2+  Pulmonary/Chest: Effort normal and breath sounds normal. No respirat than age 34)    Colon Cancer Screening: Starting at the age of 48. Yearly FOBT, sigmoidoscopy every 5 years, or colonoscopy every 10 years. A/P:  1.  Routine general medical examination at a health care facility  - reviewed healthy eating habits, exe Sig: Take 1 tablet (20 mg total) by mouth 2 (two) times daily. • amLODIPine Besylate 5 MG Oral Tab 60 tablet 2     Sig: Take 1 tablet (5 mg total) by mouth 2 (two) times daily.    • carvedilol 6.25 MG Oral Tab 30 tablet 2     Sig: Take 1 tablet (6.25 mg t

## 2019-04-01 ENCOUNTER — OFFICE VISIT (OUTPATIENT)
Dept: SURGERY | Facility: CLINIC | Age: 69
End: 2019-04-01
Payer: MEDICAID

## 2019-04-01 VITALS
DIASTOLIC BLOOD PRESSURE: 71 MMHG | WEIGHT: 147 LBS | TEMPERATURE: 98 F | HEART RATE: 69 BPM | HEIGHT: 62 IN | SYSTOLIC BLOOD PRESSURE: 159 MMHG | BODY MASS INDEX: 27.05 KG/M2

## 2019-04-01 DIAGNOSIS — R10.13 EPIGASTRIC PAIN: Primary | ICD-10-CM

## 2019-04-01 DIAGNOSIS — Z12.11 ENCOUNTER FOR SCREENING COLONOSCOPY: ICD-10-CM

## 2019-04-01 PROCEDURE — 99243 OFF/OP CNSLTJ NEW/EST LOW 30: CPT | Performed by: SURGERY

## 2019-04-01 RX ORDER — POLYETHYLENE GLYCOL 3350, SODIUM CHLORIDE, SODIUM BICARBONATE, POTASSIUM CHLORIDE 420; 11.2; 5.72; 1.48 G/4L; G/4L; G/4L; G/4L
POWDER, FOR SOLUTION ORAL
Qty: 1 BOTTLE | Refills: 0 | Status: SHIPPED | OUTPATIENT
Start: 2019-04-01 | End: 2019-06-06 | Stop reason: ALTCHOICE

## 2019-04-01 NOTE — H&P
New Patient Visit Note       Active Problems      1. Epigastric pain    2.  Encounter for screening colonoscopy        Chief Complaint   Patient presents with:  Gastro-esophageal Reflux: New pt, Dr. Daquan Drew, c/o of stomach pain, off and on -  colonoscopy con History    Socioeconomic History      Marital status:        Spouse name: Not on file      Number of children: Not on file      Years of education: Not on file      Highest education level: Not on file    Tobacco Use      Smoking status: Never Smoker frequency and urgency. Musculoskeletal: Negative for arthralgias and myalgias. Skin: Negative for color change and rash. Neurological: Negative for tremors, syncope and weakness. Hematological: Negative for adenopathy. Does not bruise/bleed easily. colonoscopy      Plan   · To workup her epigastric pain, I offered her an upper endoscopy. She was also offered screening colonoscopy. The risks, benefits, and alternatives to EGD and colonoscopy were discussed with the patient in detail.   Risks included

## 2019-04-19 PROCEDURE — 87081 CULTURE SCREEN ONLY: CPT | Performed by: SURGERY

## 2019-04-20 ENCOUNTER — LAB REQUISITION (OUTPATIENT)
Dept: LAB | Facility: HOSPITAL | Age: 69
End: 2019-04-20
Attending: SURGERY
Payer: MEDICAID

## 2019-04-20 DIAGNOSIS — R10.13 EPIGASTRIC PAIN: ICD-10-CM

## 2019-04-30 ENCOUNTER — TELEPHONE (OUTPATIENT)
Dept: FAMILY MEDICINE CLINIC | Facility: CLINIC | Age: 69
End: 2019-04-30

## 2019-05-03 DIAGNOSIS — I10 ESSENTIAL HYPERTENSION WITH GOAL BLOOD PRESSURE LESS THAN 140/90: ICD-10-CM

## 2019-05-06 RX ORDER — GLIMEPIRIDE 4 MG/1
TABLET ORAL
Qty: 90 TABLET | Refills: 0 | Status: SHIPPED | OUTPATIENT
Start: 2019-05-06 | End: 2019-05-20

## 2019-05-06 RX ORDER — LOSARTAN POTASSIUM 100 MG/1
TABLET ORAL
Qty: 30 TABLET | Refills: 2 | Status: SHIPPED | OUTPATIENT
Start: 2019-05-06 | End: 2019-06-24

## 2019-05-06 NOTE — TELEPHONE ENCOUNTER
Name from pharmacy: LOSARTAN 100MG   TAB          Will file in chart as: LOSARTAN 100 MG Oral Tab    The source prescription was discontinued on 3/5/2019 by Danette Mendosa DO for the following reason: Alternate therapy.     Sig: TAKE 1 TABLET BY ISAIAH 5/20/2019  2:30 PM Kvng Aponte MD [facility]         PROTOCOL passed

## 2019-05-08 ENCOUNTER — TELEPHONE (OUTPATIENT)
Dept: SURGERY | Facility: CLINIC | Age: 69
End: 2019-05-08

## 2019-05-20 ENCOUNTER — OFFICE VISIT (OUTPATIENT)
Dept: NEPHROLOGY | Facility: CLINIC | Age: 69
End: 2019-05-20
Payer: MEDICAID

## 2019-05-20 VITALS — BODY MASS INDEX: 27 KG/M2 | SYSTOLIC BLOOD PRESSURE: 112 MMHG | DIASTOLIC BLOOD PRESSURE: 74 MMHG | WEIGHT: 147 LBS

## 2019-05-20 DIAGNOSIS — I10 ESSENTIAL HYPERTENSION: ICD-10-CM

## 2019-05-20 DIAGNOSIS — R60.9 EDEMA, UNSPECIFIED TYPE: ICD-10-CM

## 2019-05-20 DIAGNOSIS — N18.30 CKD (CHRONIC KIDNEY DISEASE) STAGE 3, GFR 30-59 ML/MIN (HCC): Primary | ICD-10-CM

## 2019-05-20 PROCEDURE — 99214 OFFICE O/P EST MOD 30 MIN: CPT | Performed by: INTERNAL MEDICINE

## 2019-05-20 NOTE — PROGRESS NOTES
Nephrology Progress Note      ASSESSMENT/PLAN:        1) CKD 3- renal function has been relatively stable over the past 24 months with a serum creatinine of 1.7 to 2.0 mg/dL; this is presumably due to diabetic and hypertensive nephropathy as evaluation for rashes/myalgias/arthralgias    PMH:  Past Medical History:   Diagnosis Date   • Cataract    • Type II or unspecified type diabetes mellitus without mention of complication, not stated as uncontrolled    • Unspecified essential hypertension        PSH:  Pas EXAM:   /74   Wt 147 lb   BMI 26.89 kg/m²    Wt Readings from Last 3 Encounters:  05/20/19 : 147 lb  04/01/19 : 147 lb  03/23/19 : 147 lb    General: Alert and oriented in no apparent distress.   HEENT: No scleral icterus, MMM  Neck: Supple, no PASCUAL or

## 2019-06-05 ENCOUNTER — TELEPHONE (OUTPATIENT)
Dept: FAMILY MEDICINE CLINIC | Facility: CLINIC | Age: 69
End: 2019-06-05

## 2019-06-05 NOTE — TELEPHONE ENCOUNTER
Called patient's daughter for update in condition. States right sided low back pain started Friday. Feels like muscular pain. Doesn't remember doing anything that caused the pain.   Does get mid to low back pain bilaterally when she stands for long perio

## 2019-06-05 NOTE — TELEPHONE ENCOUNTER
Patient's daughter, ok per FYI, called to schedule an appointment for Ana Guadalupe, stating she's had low back pain for a few days.     Future Appointments   Date Time Provider Jaison Borjas   6/6/2019 11:15 AM Khadijah Cleveland,  [facility]

## 2019-06-06 ENCOUNTER — OFFICE VISIT (OUTPATIENT)
Dept: FAMILY MEDICINE CLINIC | Facility: CLINIC | Age: 69
End: 2019-06-06
Payer: MEDICAID

## 2019-06-06 VITALS
HEIGHT: 62 IN | OXYGEN SATURATION: 98 % | WEIGHT: 144 LBS | HEART RATE: 73 BPM | TEMPERATURE: 99 F | BODY MASS INDEX: 26.5 KG/M2 | SYSTOLIC BLOOD PRESSURE: 132 MMHG | RESPIRATION RATE: 16 BRPM | DIASTOLIC BLOOD PRESSURE: 70 MMHG

## 2019-06-06 DIAGNOSIS — M54.50 ACUTE RIGHT-SIDED LOW BACK PAIN WITHOUT SCIATICA: Primary | ICD-10-CM

## 2019-06-06 DIAGNOSIS — I10 ESSENTIAL HYPERTENSION: ICD-10-CM

## 2019-06-06 DIAGNOSIS — E11.40 TYPE 2 DIABETES MELLITUS WITH DIABETIC NEUROPATHY, WITHOUT LONG-TERM CURRENT USE OF INSULIN (HCC): ICD-10-CM

## 2019-06-06 DIAGNOSIS — M62.830 MUSCLE SPASM OF BACK: ICD-10-CM

## 2019-06-06 PROCEDURE — 99214 OFFICE O/P EST MOD 30 MIN: CPT | Performed by: FAMILY MEDICINE

## 2019-06-06 RX ORDER — TIZANIDINE 2 MG/1
2 TABLET ORAL NIGHTLY PRN
Qty: 30 TABLET | Refills: 0 | Status: SHIPPED | OUTPATIENT
Start: 2019-06-06 | End: 2019-06-24

## 2019-06-06 RX ORDER — MELOXICAM 7.5 MG/1
7.5 TABLET ORAL 2 TIMES DAILY PRN
Qty: 60 TABLET | Refills: 1 | Status: SHIPPED | OUTPATIENT
Start: 2019-06-06 | End: 2019-06-24

## 2019-06-06 NOTE — PROGRESS NOTES
HPI:   Jack Zia is a 68year old female who is here for complaints of back pain. Pain is located at right low back. Pain is described as aching. Severity is mild, moderate. The pain radiates to no radiation of pain.  Pain was precipitated by prolonged diabetes mellitus without mention of complication, not stated as uncontrolled    • Unspecified essential hypertension        Past Surgical History:   Procedure Laterality Date   • CATARACT     • CHOLECYSTECTOMY         Family History   Problem Relation Age cpm  - check diabetic labs  - check glucose levels daily  - check feet daily  - eye exam utd  - low carb diet and regular exercise as able advised  - COMP METABOLIC PANEL (14); Future  - LIPID PANEL;  Future  - HEMOGLOBIN A1C; Future  - MICROALB/CREAT RATIO

## 2019-06-17 NOTE — TELEPHONE ENCOUNTER
Dr Santosh Sahu,    Please see Daughters concern    Unfortunately we can't take a Medicaid pt in a different program as Self Pay.  Pt has had Ong since 7/1/18 No DME.

## 2019-06-20 ENCOUNTER — APPOINTMENT (OUTPATIENT)
Dept: LAB | Age: 69
End: 2019-06-20
Attending: FAMILY MEDICINE
Payer: MEDICAID

## 2019-06-20 DIAGNOSIS — E11.40 TYPE 2 DIABETES MELLITUS WITH DIABETIC NEUROPATHY, WITHOUT LONG-TERM CURRENT USE OF INSULIN (HCC): ICD-10-CM

## 2019-06-20 PROCEDURE — 83036 HEMOGLOBIN GLYCOSYLATED A1C: CPT

## 2019-06-20 PROCEDURE — 80053 COMPREHEN METABOLIC PANEL: CPT

## 2019-06-20 PROCEDURE — 36415 COLL VENOUS BLD VENIPUNCTURE: CPT

## 2019-06-20 PROCEDURE — 80061 LIPID PANEL: CPT

## 2019-06-20 PROCEDURE — 82043 UR ALBUMIN QUANTITATIVE: CPT

## 2019-06-20 PROCEDURE — 82570 ASSAY OF URINE CREATININE: CPT

## 2019-06-24 DIAGNOSIS — E78.2 MIXED HYPERLIPIDEMIA: ICD-10-CM

## 2019-06-24 DIAGNOSIS — M62.830 MUSCLE SPASM OF BACK: ICD-10-CM

## 2019-06-24 DIAGNOSIS — R60.0 BILATERAL LOWER EXTREMITY EDEMA: ICD-10-CM

## 2019-06-24 DIAGNOSIS — E11.40 TYPE 2 DIABETES MELLITUS WITH DIABETIC NEUROPATHY, WITHOUT LONG-TERM CURRENT USE OF INSULIN (HCC): ICD-10-CM

## 2019-06-24 DIAGNOSIS — M54.50 ACUTE RIGHT-SIDED LOW BACK PAIN WITHOUT SCIATICA: ICD-10-CM

## 2019-06-24 DIAGNOSIS — I10 ESSENTIAL HYPERTENSION WITH GOAL BLOOD PRESSURE LESS THAN 140/90: ICD-10-CM

## 2019-06-24 RX ORDER — FUROSEMIDE 20 MG/1
TABLET ORAL
Qty: 60 TABLET | Refills: 2 | Status: SHIPPED | OUTPATIENT
Start: 2019-06-24 | End: 2020-02-27 | Stop reason: ALTCHOICE

## 2019-06-24 RX ORDER — CARVEDILOL 6.25 MG/1
TABLET ORAL
Qty: 30 TABLET | Refills: 2 | Status: SHIPPED | OUTPATIENT
Start: 2019-06-24 | End: 2019-12-27

## 2019-06-24 RX ORDER — ATORVASTATIN CALCIUM 20 MG/1
TABLET, FILM COATED ORAL
Qty: 90 TABLET | Refills: 1 | Status: CANCELLED | OUTPATIENT
Start: 2019-06-24

## 2019-06-24 RX ORDER — LOSARTAN POTASSIUM 100 MG/1
100 TABLET ORAL
Qty: 30 TABLET | Refills: 2 | Status: CANCELLED | OUTPATIENT
Start: 2019-06-24

## 2019-06-24 RX ORDER — GLIMEPIRIDE 4 MG/1
4 TABLET ORAL
Qty: 90 TABLET | Refills: 0 | Status: SHIPPED | OUTPATIENT
Start: 2019-06-24 | End: 2020-02-27

## 2019-06-24 RX ORDER — TIZANIDINE 2 MG/1
TABLET ORAL
Qty: 30 TABLET | Refills: 0 | Status: SHIPPED | OUTPATIENT
Start: 2019-06-24 | End: 2020-02-27 | Stop reason: ALTCHOICE

## 2019-06-24 RX ORDER — AMLODIPINE BESYLATE 5 MG/1
TABLET ORAL
Qty: 60 TABLET | Refills: 2 | Status: SHIPPED | OUTPATIENT
Start: 2019-06-24 | End: 2019-12-27

## 2019-06-24 RX ORDER — LOSARTAN POTASSIUM 100 MG/1
100 TABLET ORAL
Qty: 30 TABLET | Refills: 2 | Status: SHIPPED | OUTPATIENT
Start: 2019-06-24 | End: 2020-01-20

## 2019-06-24 RX ORDER — MELOXICAM 7.5 MG/1
7.5 TABLET ORAL 2 TIMES DAILY PRN
Qty: 60 TABLET | Refills: 1 | Status: SHIPPED | OUTPATIENT
Start: 2019-06-24 | End: 2020-02-27 | Stop reason: ALTCHOICE

## 2019-06-24 NOTE — TELEPHONE ENCOUNTER
LOV 6/19    LAST LAB 6/19    LAST RX    Next OV Visit date not found      PROTOCOL       Refilled what I could 3 months. [place] daughter says you know. Please advise on refills. Thank You    Told by pharmacy Rx is too soon.

## 2019-06-24 NOTE — TELEPHONE ENCOUNTER
Patients daughter called regarding her moms prescriptions she needs all of them filled for a 3 months supply she is know out of the [place] for 3 or more months she said she spoke to the dr already and she tolled her once she had done her blood work she co

## 2019-12-21 DIAGNOSIS — E78.2 MIXED HYPERLIPIDEMIA: ICD-10-CM

## 2019-12-21 DIAGNOSIS — E11.40 TYPE 2 DIABETES MELLITUS WITH DIABETIC NEUROPATHY, WITHOUT LONG-TERM CURRENT USE OF INSULIN (HCC): ICD-10-CM

## 2019-12-23 RX ORDER — ATORVASTATIN CALCIUM 20 MG/1
TABLET, FILM COATED ORAL
Qty: 90 TABLET | Refills: 0 | Status: SHIPPED | OUTPATIENT
Start: 2019-12-23 | End: 2020-02-25

## 2019-12-23 NOTE — TELEPHONE ENCOUNTER
Cholesterol Medication Protocol Cdwskc67/21 3:55 PM   ALT < 80    ALT resulted within past year    Lipid panel within past 12 months    Appointment within past 12 or next 3 months     LOV  6/6/19     LAST LAB 6/20/19     LAST RX 6/6/19 3/23/19  90 with 1 soha

## 2019-12-24 DIAGNOSIS — I10 ESSENTIAL HYPERTENSION WITH GOAL BLOOD PRESSURE LESS THAN 140/90: ICD-10-CM

## 2019-12-24 DIAGNOSIS — R60.0 BILATERAL LOWER EXTREMITY EDEMA: ICD-10-CM

## 2019-12-26 DIAGNOSIS — R60.0 BILATERAL LOWER EXTREMITY EDEMA: ICD-10-CM

## 2019-12-26 DIAGNOSIS — I10 ESSENTIAL HYPERTENSION WITH GOAL BLOOD PRESSURE LESS THAN 140/90: ICD-10-CM

## 2019-12-27 RX ORDER — CARVEDILOL 6.25 MG/1
TABLET ORAL
Qty: 30 TABLET | Refills: 0 | Status: SHIPPED | OUTPATIENT
Start: 2019-12-27 | End: 2020-02-25

## 2019-12-27 RX ORDER — AMLODIPINE BESYLATE 5 MG/1
TABLET ORAL
Qty: 60 TABLET | Refills: 0 | Status: SHIPPED | OUTPATIENT
Start: 2019-12-27 | End: 2020-02-25

## 2020-01-18 ENCOUNTER — TELEPHONE (OUTPATIENT)
Dept: FAMILY MEDICINE CLINIC | Facility: CLINIC | Age: 70
End: 2020-01-18

## 2020-01-18 DIAGNOSIS — I10 ESSENTIAL HYPERTENSION WITH GOAL BLOOD PRESSURE LESS THAN 140/90: ICD-10-CM

## 2020-01-20 RX ORDER — LOSARTAN POTASSIUM 100 MG/1
TABLET ORAL
Qty: 30 TABLET | Refills: 0 | Status: SHIPPED | OUTPATIENT
Start: 2020-01-20 | End: 2020-02-25

## 2020-01-20 NOTE — TELEPHONE ENCOUNTER
Hypertension Medications Protocol Failed1/18 3:07 PM   Appointment in past 6 or next 3 months    CMP or BMP in past 12 months    Last serum creatinine< 2.0     LOV 6/6/19     LAST LAB 6/20/19     LAST RX  6/24/19 30 with 2 refills     Next OV No future renea

## 2020-02-22 DIAGNOSIS — R60.0 BILATERAL LOWER EXTREMITY EDEMA: ICD-10-CM

## 2020-02-22 DIAGNOSIS — I10 ESSENTIAL HYPERTENSION WITH GOAL BLOOD PRESSURE LESS THAN 140/90: ICD-10-CM

## 2020-02-25 DIAGNOSIS — R60.0 BILATERAL LOWER EXTREMITY EDEMA: ICD-10-CM

## 2020-02-25 DIAGNOSIS — I10 ESSENTIAL HYPERTENSION WITH GOAL BLOOD PRESSURE LESS THAN 140/90: ICD-10-CM

## 2020-02-25 DIAGNOSIS — E11.40 TYPE 2 DIABETES MELLITUS WITH DIABETIC NEUROPATHY, WITHOUT LONG-TERM CURRENT USE OF INSULIN (HCC): ICD-10-CM

## 2020-02-25 DIAGNOSIS — E78.2 MIXED HYPERLIPIDEMIA: ICD-10-CM

## 2020-02-25 NOTE — TELEPHONE ENCOUNTER
LOSARTAN 100 MG Oral Tab     CARVEDILOL 6.25 MG Oral Tab     AMLODIPINE BESYLATE 5 MG Oral Tab     ATORVASTATIN 20 MG Oral Tab       Patient is needing the above four medications sent to [facility] in [place].  Patient scheduled an appointment for 2/27 dorothy

## 2020-02-26 RX ORDER — CARVEDILOL 6.25 MG/1
6.25 TABLET ORAL
Qty: 30 TABLET | Refills: 0 | Status: SHIPPED | OUTPATIENT
Start: 2020-02-26 | End: 2020-03-23

## 2020-02-26 RX ORDER — AMLODIPINE BESYLATE 5 MG/1
5 TABLET ORAL 2 TIMES DAILY
Qty: 60 TABLET | Refills: 0 | Status: SHIPPED | OUTPATIENT
Start: 2020-02-26 | End: 2020-03-23

## 2020-02-26 RX ORDER — LOSARTAN POTASSIUM 100 MG/1
100 TABLET ORAL
Qty: 30 TABLET | Refills: 0 | Status: SHIPPED | OUTPATIENT
Start: 2020-02-26 | End: 2020-03-23

## 2020-02-26 RX ORDER — ATORVASTATIN CALCIUM 20 MG/1
20 TABLET, FILM COATED ORAL DAILY
Qty: 90 TABLET | Refills: 0 | Status: SHIPPED | OUTPATIENT
Start: 2020-02-26 | End: 2020-06-29

## 2020-02-27 ENCOUNTER — OFFICE VISIT (OUTPATIENT)
Dept: FAMILY MEDICINE CLINIC | Facility: CLINIC | Age: 70
End: 2020-02-27
Payer: MEDICAID

## 2020-02-27 VITALS
DIASTOLIC BLOOD PRESSURE: 66 MMHG | TEMPERATURE: 98 F | OXYGEN SATURATION: 99 % | RESPIRATION RATE: 16 BRPM | HEART RATE: 74 BPM | SYSTOLIC BLOOD PRESSURE: 136 MMHG | BODY MASS INDEX: 26.57 KG/M2 | HEIGHT: 62 IN | WEIGHT: 144.38 LBS

## 2020-02-27 DIAGNOSIS — R60.0 BILATERAL LOWER EXTREMITY EDEMA: ICD-10-CM

## 2020-02-27 DIAGNOSIS — E55.9 VITAMIN D INSUFFICIENCY: ICD-10-CM

## 2020-02-27 DIAGNOSIS — E53.8 VITAMIN B12 DEFICIENCY: ICD-10-CM

## 2020-02-27 DIAGNOSIS — E78.2 MIXED HYPERLIPIDEMIA: ICD-10-CM

## 2020-02-27 DIAGNOSIS — I10 ESSENTIAL HYPERTENSION: ICD-10-CM

## 2020-02-27 DIAGNOSIS — E11.40 TYPE 2 DIABETES MELLITUS WITH DIABETIC NEUROPATHY, WITHOUT LONG-TERM CURRENT USE OF INSULIN (HCC): Primary | ICD-10-CM

## 2020-02-27 DIAGNOSIS — N18.30 CKD (CHRONIC KIDNEY DISEASE) STAGE 3, GFR 30-59 ML/MIN (HCC): ICD-10-CM

## 2020-02-27 PROCEDURE — 99214 OFFICE O/P EST MOD 30 MIN: CPT | Performed by: FAMILY MEDICINE

## 2020-02-27 RX ORDER — CARVEDILOL 6.25 MG/1
TABLET ORAL
Qty: 30 TABLET | Refills: 0 | OUTPATIENT
Start: 2020-02-27

## 2020-02-27 RX ORDER — FUROSEMIDE 20 MG/1
20 TABLET ORAL DAILY
Qty: 30 TABLET | Refills: 1 | Status: SHIPPED | OUTPATIENT
Start: 2020-02-27 | End: 2020-05-04

## 2020-02-27 RX ORDER — LANCETS 33 GAUGE
1 EACH MISCELLANEOUS DAILY
Qty: 1 BOX | Refills: 1 | Status: SHIPPED | OUTPATIENT
Start: 2020-02-27 | End: 2020-03-30

## 2020-02-27 RX ORDER — LOSARTAN POTASSIUM 100 MG/1
TABLET ORAL
Qty: 30 TABLET | Refills: 0 | OUTPATIENT
Start: 2020-02-27

## 2020-02-27 RX ORDER — GLIMEPIRIDE 4 MG/1
4 TABLET ORAL
Qty: 90 TABLET | Refills: 0 | Status: SHIPPED | OUTPATIENT
Start: 2020-02-27 | End: 2020-03-24

## 2020-02-27 NOTE — PROGRESS NOTES
HPI:   Kendy Her is a 65year old female who presents for recheck of her chronic conditions and med refills. DM2, has been out of her glimepiride over the past few days. Patient’s FBS have been high per pt. Last visit with ophthalmologist was 3/2019. 166 (H) 01/23/2019     (H) 11/03/2017     Lab Results   Component Value Date    CHOLEST 126 06/20/2019    CHOLEST 130 01/23/2019    CHOLEST 94 11/03/2017     Lab Results   Component Value Date    HDL 43 06/20/2019    HDL 50 01/23/2019    HDL 49 11/0 Minutes per session: Not on file      Stress: Not on file    Relationships      Social connections:        Talks on phone: Not on file        Gets together: Not on file        Attends [employer] service: Not on file        Active member of club or organizat bring at next ov    2. Essential hypertension  3. CKD (chronic kidney disease) stage 3, GFR 30-59 ml/min ([facility])  - bp stable  - cpm    4.  Bilateral lower extremity edema  - will restart lasix 20mg po daily to help with BLE edema  - symptomatic tx also advis

## 2020-03-03 ENCOUNTER — LAB ENCOUNTER (OUTPATIENT)
Dept: LAB | Age: 70
End: 2020-03-03
Attending: FAMILY MEDICINE
Payer: MEDICAID

## 2020-03-03 DIAGNOSIS — N18.30 CKD (CHRONIC KIDNEY DISEASE) STAGE 3, GFR 30-59 ML/MIN (HCC): ICD-10-CM

## 2020-03-03 DIAGNOSIS — E55.9 VITAMIN D INSUFFICIENCY: ICD-10-CM

## 2020-03-03 DIAGNOSIS — E78.2 MIXED HYPERLIPIDEMIA: ICD-10-CM

## 2020-03-03 DIAGNOSIS — E11.40 TYPE 2 DIABETES MELLITUS WITH DIABETIC NEUROPATHY, WITHOUT LONG-TERM CURRENT USE OF INSULIN (HCC): ICD-10-CM

## 2020-03-03 DIAGNOSIS — I10 ESSENTIAL HYPERTENSION: ICD-10-CM

## 2020-03-03 DIAGNOSIS — E53.8 VITAMIN B12 DEFICIENCY: ICD-10-CM

## 2020-03-03 LAB
ALBUMIN SERPL-MCNC: 3.7 G/DL (ref 3.4–5)
ALBUMIN/GLOB SERPL: 0.8 {RATIO} (ref 1–2)
ALP LIVER SERPL-CCNC: 117 U/L (ref 55–142)
ALT SERPL-CCNC: 21 U/L (ref 13–56)
ANION GAP SERPL CALC-SCNC: 5 MMOL/L (ref 0–18)
AST SERPL-CCNC: 17 U/L (ref 15–37)
BILIRUB SERPL-MCNC: 0.5 MG/DL (ref 0.1–2)
BUN BLD-MCNC: 25 MG/DL (ref 7–18)
BUN/CREAT SERPL: 18.2 (ref 10–20)
CALCIUM BLD-MCNC: 9 MG/DL (ref 8.5–10.1)
CHLORIDE SERPL-SCNC: 108 MMOL/L (ref 98–112)
CHOLEST SMN-MCNC: 108 MG/DL (ref ?–200)
CO2 SERPL-SCNC: 28 MMOL/L (ref 21–32)
CREAT BLD-MCNC: 1.37 MG/DL (ref 0.55–1.02)
CREAT UR-SCNC: 121 MG/DL
EST. AVERAGE GLUCOSE BLD GHB EST-MCNC: 200 MG/DL (ref 68–126)
GLOBULIN PLAS-MCNC: 4.6 G/DL (ref 2.8–4.4)
GLUCOSE BLD-MCNC: 81 MG/DL (ref 70–99)
HBA1C MFR BLD HPLC: 8.6 % (ref ?–5.7)
HDLC SERPL-MCNC: 42 MG/DL (ref 40–59)
LDLC SERPL CALC-MCNC: 47 MG/DL (ref ?–100)
M PROTEIN MFR SERPL ELPH: 8.3 G/DL (ref 6.4–8.2)
MICROALBUMIN UR-MCNC: 17.4 MG/DL
MICROALBUMIN/CREAT 24H UR-RTO: 143.8 UG/MG (ref ?–30)
NONHDLC SERPL-MCNC: 66 MG/DL (ref ?–130)
OSMOLALITY SERPL CALC.SUM OF ELEC: 295 MOSM/KG (ref 275–295)
PATIENT FASTING Y/N/NP: YES
PATIENT FASTING Y/N/NP: YES
POTASSIUM SERPL-SCNC: 4 MMOL/L (ref 3.5–5.1)
SODIUM SERPL-SCNC: 141 MMOL/L (ref 136–145)
TRIGL SERPL-MCNC: 97 MG/DL (ref 30–149)
VIT B12 SERPL-MCNC: 233 PG/ML (ref 193–986)
VIT D+METAB SERPL-MCNC: 26.6 NG/ML (ref 30–100)
VLDLC SERPL CALC-MCNC: 19 MG/DL (ref 0–30)

## 2020-03-03 PROCEDURE — 82306 VITAMIN D 25 HYDROXY: CPT

## 2020-03-03 PROCEDURE — 83036 HEMOGLOBIN GLYCOSYLATED A1C: CPT

## 2020-03-03 PROCEDURE — 82043 UR ALBUMIN QUANTITATIVE: CPT

## 2020-03-03 PROCEDURE — 82607 VITAMIN B-12: CPT

## 2020-03-03 PROCEDURE — 80061 LIPID PANEL: CPT

## 2020-03-03 PROCEDURE — 36415 COLL VENOUS BLD VENIPUNCTURE: CPT

## 2020-03-03 PROCEDURE — 82570 ASSAY OF URINE CREATININE: CPT

## 2020-03-03 PROCEDURE — 80053 COMPREHEN METABOLIC PANEL: CPT

## 2020-03-21 DIAGNOSIS — I10 ESSENTIAL HYPERTENSION WITH GOAL BLOOD PRESSURE LESS THAN 140/90: ICD-10-CM

## 2020-03-21 DIAGNOSIS — R60.0 BILATERAL LOWER EXTREMITY EDEMA: ICD-10-CM

## 2020-03-23 ENCOUNTER — TELEPHONE (OUTPATIENT)
Dept: FAMILY MEDICINE CLINIC | Facility: CLINIC | Age: 70
End: 2020-03-23

## 2020-03-23 DIAGNOSIS — R60.0 BILATERAL LOWER EXTREMITY EDEMA: ICD-10-CM

## 2020-03-23 DIAGNOSIS — I10 ESSENTIAL HYPERTENSION WITH GOAL BLOOD PRESSURE LESS THAN 140/90: ICD-10-CM

## 2020-03-23 RX ORDER — CARVEDILOL 6.25 MG/1
TABLET ORAL
Qty: 30 TABLET | Refills: 0 | Status: SHIPPED | OUTPATIENT
Start: 2020-03-23 | End: 2020-05-01

## 2020-03-23 RX ORDER — LOSARTAN POTASSIUM 100 MG/1
TABLET ORAL
Qty: 30 TABLET | Refills: 0 | Status: SHIPPED | OUTPATIENT
Start: 2020-03-23 | End: 2020-05-01

## 2020-03-23 RX ORDER — AMLODIPINE BESYLATE 5 MG/1
TABLET ORAL
Qty: 60 TABLET | Refills: 0 | Status: SHIPPED | OUTPATIENT
Start: 2020-03-23 | End: 2020-07-14

## 2020-03-23 NOTE — TELEPHONE ENCOUNTER
LOV 2/27/2020    LAST LAB 3/3/20    LAST RX    carvedilol 6.25 MG Oral Tab 30 tablet 0 2/26/2020    Sig:   Take 1 tablet (6.25 mg total) by mouth once daily.        losartan 100 MG Oral Tab 30 tablet 0 2/26/2020    Sig:   Take 1 tablet (100 mg total) by lobo

## 2020-03-30 DIAGNOSIS — E11.40 TYPE 2 DIABETES MELLITUS WITH DIABETIC NEUROPATHY, WITHOUT LONG-TERM CURRENT USE OF INSULIN (HCC): ICD-10-CM

## 2020-03-30 RX ORDER — LANCETS 33 GAUGE
1 EACH MISCELLANEOUS DAILY
Qty: 1 BOX | Refills: 2 | Status: SHIPPED | OUTPATIENT
Start: 2020-03-30 | End: 2020-11-16

## 2020-03-30 NOTE — TELEPHONE ENCOUNTER
LOV 2020    LAST LAB 3/3/20    LAST RX   OneTouch Delica Lancets 71C Does not apply Misc 1 Box 1 2020   Si lancet by Finger stick route daily. Next OV No future appointments.     PROTOCOL   Diabetic Supplies Protocol Pass

## 2020-04-03 ENCOUNTER — PATIENT MESSAGE (OUTPATIENT)
Dept: FAMILY MEDICINE CLINIC | Facility: CLINIC | Age: 70
End: 2020-04-03

## 2020-04-06 NOTE — TELEPHONE ENCOUNTER
From: Yobani Shipley, DO  Sent: 4/3/2020 3:07 PM CDT  Subject: Nhi Alarcon. I've been trying to order lancets for my One Touch Ultra blood glucose device but keep getting the wrong ones filled in by pharmacy.

## 2020-04-17 ENCOUNTER — TELEMEDICINE (OUTPATIENT)
Dept: FAMILY MEDICINE CLINIC | Facility: CLINIC | Age: 70
End: 2020-04-17

## 2020-04-17 ENCOUNTER — PATIENT MESSAGE (OUTPATIENT)
Dept: FAMILY MEDICINE CLINIC | Facility: CLINIC | Age: 70
End: 2020-04-17

## 2020-04-17 VITALS — DIASTOLIC BLOOD PRESSURE: 97 MMHG | SYSTOLIC BLOOD PRESSURE: 158 MMHG

## 2020-04-17 DIAGNOSIS — I10 ELEVATED BLOOD PRESSURE READING WITH DIAGNOSIS OF HYPERTENSION: Primary | ICD-10-CM

## 2020-04-17 DIAGNOSIS — E11.40 TYPE 2 DIABETES MELLITUS WITH DIABETIC NEUROPATHY, WITHOUT LONG-TERM CURRENT USE OF INSULIN (HCC): ICD-10-CM

## 2020-04-17 DIAGNOSIS — R42 DIZZINESS: ICD-10-CM

## 2020-04-17 PROCEDURE — 99214 OFFICE O/P EST MOD 30 MIN: CPT | Performed by: FAMILY MEDICINE

## 2020-04-17 RX ORDER — AMLODIPINE BESYLATE 10 MG/1
10 TABLET ORAL DAILY
Qty: 30 TABLET | Refills: 1 | Status: SHIPPED | OUTPATIENT
Start: 2020-04-17 | End: 2020-07-14

## 2020-04-17 NOTE — TELEPHONE ENCOUNTER
From: Aneesh Kellogg  To: Joy Rodríguez DO  Sent: 4/17/2020 11:31 AM CDT  Subject: Other    Salam Dr. Bret Camejo,  My mom Cecelia Shelby has been experiencing some dizziness since the morning of Thursday, April 16th.  She mentioned it in the evening that day and s

## 2020-04-17 NOTE — PROGRESS NOTES
Video Visit:  Please note that the following visit was completed using two-way, real-time interactive audio and/or video communication.   This has been done in good juan c to provide continuity of care in the best interest of the provider-patient relationshi OneTouch Delica Lancets 45P Does not apply Misc 1 lancet by Finger stick route daily.  1 Box 2   • glimepiride 4 MG Oral Tab Take 1 tablet (4 mg total) by mouth every morning before breakfast. 30 tablet 2   • LOSARTAN 100 MG Oral Tab Take 1 tablet by mouth insulin (HCC)  - stable  - cpm  - monitor blood glucose levels daily  - check feet daily  - follow low carb diet and exercise regularly once dizziness resolves    She and daughter understand and agree with tx plan  Call if s/s persist or worsen, update nex

## 2020-04-18 ENCOUNTER — TELEPHONE (OUTPATIENT)
Dept: FAMILY MEDICINE CLINIC | Facility: CLINIC | Age: 70
End: 2020-04-18

## 2020-04-18 NOTE — TELEPHONE ENCOUNTER
S/w pt and daughter in regards to bp readings and symptoms. states her symptoms have improved. No longer having heaviness of her head and the dizziness and feeling off balance has also improved.   BP readings from the 229-597W systolic and 02V diastolic wit

## 2020-05-01 ENCOUNTER — PATIENT MESSAGE (OUTPATIENT)
Dept: FAMILY MEDICINE CLINIC | Facility: CLINIC | Age: 70
End: 2020-05-01

## 2020-05-01 DIAGNOSIS — R60.0 BILATERAL LOWER EXTREMITY EDEMA: ICD-10-CM

## 2020-05-01 DIAGNOSIS — I10 ESSENTIAL HYPERTENSION WITH GOAL BLOOD PRESSURE LESS THAN 140/90: ICD-10-CM

## 2020-05-04 ENCOUNTER — PATIENT MESSAGE (OUTPATIENT)
Dept: FAMILY MEDICINE CLINIC | Facility: CLINIC | Age: 70
End: 2020-05-04

## 2020-05-04 DIAGNOSIS — I10 ESSENTIAL HYPERTENSION WITH GOAL BLOOD PRESSURE LESS THAN 140/90: ICD-10-CM

## 2020-05-04 DIAGNOSIS — R60.0 BILATERAL LOWER EXTREMITY EDEMA: ICD-10-CM

## 2020-05-04 RX ORDER — CARVEDILOL 6.25 MG/1
6.25 TABLET ORAL DAILY
Qty: 30 TABLET | Refills: 2 | OUTPATIENT
Start: 2020-05-04

## 2020-05-04 RX ORDER — FUROSEMIDE 20 MG/1
TABLET ORAL
Qty: 30 TABLET | Refills: 0 | OUTPATIENT
Start: 2020-05-04

## 2020-05-04 RX ORDER — LOSARTAN POTASSIUM 100 MG/1
100 TABLET ORAL DAILY
Qty: 30 TABLET | Refills: 2 | Status: SHIPPED | OUTPATIENT
Start: 2020-05-04 | End: 2020-08-02

## 2020-05-04 RX ORDER — CARVEDILOL 6.25 MG/1
6.25 TABLET ORAL DAILY
Qty: 30 TABLET | Refills: 2 | Status: SHIPPED | OUTPATIENT
Start: 2020-05-04 | End: 2020-08-02

## 2020-05-04 RX ORDER — CARVEDILOL 6.25 MG/1
TABLET ORAL
Qty: 30 TABLET | Refills: 0 | OUTPATIENT
Start: 2020-05-04

## 2020-05-04 RX ORDER — LOSARTAN POTASSIUM 100 MG/1
100 TABLET ORAL DAILY
Qty: 30 TABLET | Refills: 2 | OUTPATIENT
Start: 2020-05-04

## 2020-05-04 RX ORDER — FUROSEMIDE 20 MG/1
20 TABLET ORAL DAILY
Qty: 30 TABLET | Refills: 2 | Status: SHIPPED | OUTPATIENT
Start: 2020-05-04 | End: 2020-07-14

## 2020-05-04 RX ORDER — LOSARTAN POTASSIUM 100 MG/1
TABLET ORAL
Qty: 30 TABLET | Refills: 0 | OUTPATIENT
Start: 2020-05-04

## 2020-05-04 NOTE — TELEPHONE ENCOUNTER
From: John Cummings DO  Sent: 5/1/2020 12:10 PM CDT  Subject: Prescription Question    I'm also having trouble ordering Losartan at [address] They dont see any recent prescription renewals and are waiting for your reply.

## 2020-05-04 NOTE — TELEPHONE ENCOUNTER
From: Landon Hilliard DO  Sent: 5/1/2020 11:19 AM CDT  Subject: Jalil Spence.    The [address] [facility] has no more refills for carvidilol which is weird because you renewed all prescriptions at our last appointm

## 2020-05-04 NOTE — TELEPHONE ENCOUNTER
LOV 2/27/2020    LAST LAB 3/3/20    LAST RX 5/4/20    Next OV No future appointments.     PROTOCOL   Hypertension Medications Protocol Passed5/4 10:49 AM   CMP or BMP in past 12 months    Last serum creatinine< 2.0    Appointment in past 6 or next 3 months

## 2020-05-04 NOTE — TELEPHONE ENCOUNTER
From: Jack Zheng DO  Sent: 5/4/2020 5:16 AM CDT  Subject: Kenna Fox, I keep facing problems while ordering my moms.  So far she is out of Losartan, carvedilol and furosemide and the pharmacy hasn't rece

## 2020-06-28 DIAGNOSIS — E11.40 TYPE 2 DIABETES MELLITUS WITH DIABETIC NEUROPATHY, WITHOUT LONG-TERM CURRENT USE OF INSULIN (HCC): ICD-10-CM

## 2020-06-28 DIAGNOSIS — E78.2 MIXED HYPERLIPIDEMIA: ICD-10-CM

## 2020-06-29 RX ORDER — ATORVASTATIN CALCIUM 20 MG/1
TABLET, FILM COATED ORAL
Qty: 30 TABLET | Refills: 2 | Status: SHIPPED | OUTPATIENT
Start: 2020-06-29 | End: 2020-10-09

## 2020-06-29 NOTE — TELEPHONE ENCOUNTER
Cholesterol Medication Protocol Passed6/28 2:03 PM   ALT < 80    ALT resulted within past year    Lipid panel within past 12 months    Appointment within past 12 or next 3 months     LOV 4/17/20     LAST LAB 3/3/20     LAST RX  2/26/20 90 tabs 0 refill

## 2020-07-14 ENCOUNTER — OFFICE VISIT (OUTPATIENT)
Dept: FAMILY MEDICINE CLINIC | Facility: CLINIC | Age: 70
End: 2020-07-14
Payer: MEDICAID

## 2020-07-14 VITALS
HEIGHT: 62 IN | HEART RATE: 58 BPM | DIASTOLIC BLOOD PRESSURE: 70 MMHG | TEMPERATURE: 98 F | OXYGEN SATURATION: 98 % | SYSTOLIC BLOOD PRESSURE: 128 MMHG | WEIGHT: 148.38 LBS | BODY MASS INDEX: 27.3 KG/M2 | RESPIRATION RATE: 16 BRPM

## 2020-07-14 DIAGNOSIS — E78.2 MIXED HYPERLIPIDEMIA: ICD-10-CM

## 2020-07-14 DIAGNOSIS — E53.8 VITAMIN B12 DEFICIENCY: ICD-10-CM

## 2020-07-14 DIAGNOSIS — R60.0 BILATERAL LOWER EXTREMITY EDEMA: Primary | ICD-10-CM

## 2020-07-14 DIAGNOSIS — I10 ESSENTIAL HYPERTENSION: ICD-10-CM

## 2020-07-14 DIAGNOSIS — E11.40 TYPE 2 DIABETES MELLITUS WITH DIABETIC NEUROPATHY, WITHOUT LONG-TERM CURRENT USE OF INSULIN (HCC): ICD-10-CM

## 2020-07-14 DIAGNOSIS — K21.9 GASTROESOPHAGEAL REFLUX DISEASE, ESOPHAGITIS PRESENCE NOT SPECIFIED: ICD-10-CM

## 2020-07-14 DIAGNOSIS — N18.30 CKD (CHRONIC KIDNEY DISEASE) STAGE 3, GFR 30-59 ML/MIN (HCC): ICD-10-CM

## 2020-07-14 DIAGNOSIS — E55.9 VITAMIN D INSUFFICIENCY: ICD-10-CM

## 2020-07-14 PROCEDURE — 99214 OFFICE O/P EST MOD 30 MIN: CPT | Performed by: FAMILY MEDICINE

## 2020-07-14 PROCEDURE — 3074F SYST BP LT 130 MM HG: CPT | Performed by: FAMILY MEDICINE

## 2020-07-14 PROCEDURE — 3008F BODY MASS INDEX DOCD: CPT | Performed by: FAMILY MEDICINE

## 2020-07-14 PROCEDURE — 3078F DIAST BP <80 MM HG: CPT | Performed by: FAMILY MEDICINE

## 2020-07-14 RX ORDER — AMLODIPINE BESYLATE 5 MG/1
5 TABLET ORAL DAILY
Qty: 30 TABLET | Refills: 2 | Status: SHIPPED | OUTPATIENT
Start: 2020-07-14 | End: 2020-11-02

## 2020-07-14 RX ORDER — OMEPRAZOLE 40 MG/1
40 CAPSULE, DELAYED RELEASE ORAL
Qty: 30 CAPSULE | Refills: 1 | Status: SHIPPED | OUTPATIENT
Start: 2020-07-14 | End: 2021-01-19

## 2020-07-14 RX ORDER — FUROSEMIDE 20 MG/1
TABLET ORAL
Qty: 90 TABLET | Refills: 1 | Status: SHIPPED | OUTPATIENT
Start: 2020-07-14 | End: 2020-09-30

## 2020-07-14 RX ORDER — AMLODIPINE BESYLATE 10 MG/1
10 TABLET ORAL DAILY
Qty: 30 TABLET | Refills: 1 | Status: CANCELLED | OUTPATIENT
Start: 2020-07-14

## 2020-07-16 NOTE — PROGRESS NOTES
Andres Augustin is a 65year old female. HPI:   Patient presents for recheck of her chronic conditions, c/o BLE edema and LUQ/epigastric pain. HTN, has been taking meds as directed. No SEs noted.  However, over the past several weeks has noted increased amLODIPine Besylate 5 MG Oral Tab Take 1 tablet (5 mg total) by mouth daily.  30 tablet 2   • furosemide 20 MG Oral Tab Take 2 tabs (40mg) po qam and 1 tab (20mg) po qpm 90 tablet 1   • Omeprazole 40 MG Oral Capsule Delayed Release Take 1 capsule (40 mg tot supple   LUNGS: clear to auscultation, no w/r/r  CARDIO: RRR without murmur  GI: good BS's,no masses, HSM, mild tenderness to deep palpitation of epigastric and LUQ  EXTREMITIES: no cyanosis, clubbing, 2+ BLE edema worse at foot/ankles, no calf tenderness, return in 2wks, sooner if needed.

## 2020-07-23 ENCOUNTER — PATIENT MESSAGE (OUTPATIENT)
Dept: FAMILY MEDICINE CLINIC | Facility: CLINIC | Age: 70
End: 2020-07-23

## 2020-07-23 DIAGNOSIS — E11.40 TYPE 2 DIABETES MELLITUS WITH DIABETIC NEUROPATHY, WITHOUT LONG-TERM CURRENT USE OF INSULIN (HCC): ICD-10-CM

## 2020-07-23 RX ORDER — GLIMEPIRIDE 4 MG/1
4 TABLET ORAL
Qty: 90 TABLET | Refills: 0 | Status: SHIPPED | OUTPATIENT
Start: 2020-07-23 | End: 2021-01-18

## 2020-07-23 NOTE — TELEPHONE ENCOUNTER
From: Harlan Pinzon DO  Sent: 7/23/2020 12:58 AM CDT  Subject: Prescription Question    Hello. My mom Carrie Underwood is out of refills on her Glimepride. We will need this prescription renewed asap.  She just had an appointment with the

## 2020-07-29 ENCOUNTER — APPOINTMENT (OUTPATIENT)
Dept: LAB | Age: 70
End: 2020-07-29
Attending: FAMILY MEDICINE
Payer: MEDICAID

## 2020-07-29 DIAGNOSIS — E78.2 MIXED HYPERLIPIDEMIA: ICD-10-CM

## 2020-07-29 DIAGNOSIS — I10 ESSENTIAL HYPERTENSION: ICD-10-CM

## 2020-07-29 DIAGNOSIS — N18.30 CKD (CHRONIC KIDNEY DISEASE) STAGE 3, GFR 30-59 ML/MIN (HCC): ICD-10-CM

## 2020-07-29 DIAGNOSIS — E11.40 TYPE 2 DIABETES MELLITUS WITH DIABETIC NEUROPATHY, WITHOUT LONG-TERM CURRENT USE OF INSULIN (HCC): ICD-10-CM

## 2020-07-29 DIAGNOSIS — E55.9 VITAMIN D INSUFFICIENCY: ICD-10-CM

## 2020-07-29 DIAGNOSIS — E53.8 VITAMIN B12 DEFICIENCY: ICD-10-CM

## 2020-07-29 LAB
ALBUMIN SERPL-MCNC: 3.8 G/DL (ref 3.4–5)
ALBUMIN/GLOB SERPL: 0.8 {RATIO} (ref 1–2)
ALP LIVER SERPL-CCNC: 115 U/L (ref 55–142)
ALT SERPL-CCNC: 19 U/L (ref 13–56)
ANION GAP SERPL CALC-SCNC: 0 MMOL/L (ref 0–18)
AST SERPL-CCNC: 17 U/L (ref 15–37)
BILIRUB SERPL-MCNC: 0.4 MG/DL (ref 0.1–2)
BUN BLD-MCNC: 38 MG/DL (ref 7–18)
BUN/CREAT SERPL: 22 (ref 10–20)
CALCIUM BLD-MCNC: 9 MG/DL (ref 8.5–10.1)
CHLORIDE SERPL-SCNC: 106 MMOL/L (ref 98–112)
CHOLEST SMN-MCNC: 112 MG/DL (ref ?–200)
CO2 SERPL-SCNC: 31 MMOL/L (ref 21–32)
CREAT BLD-MCNC: 1.73 MG/DL (ref 0.55–1.02)
EST. AVERAGE GLUCOSE BLD GHB EST-MCNC: 203 MG/DL (ref 68–126)
GLOBULIN PLAS-MCNC: 4.6 G/DL (ref 2.8–4.4)
GLUCOSE BLD-MCNC: 138 MG/DL (ref 70–99)
HBA1C MFR BLD HPLC: 8.7 % (ref ?–5.7)
HDLC SERPL-MCNC: 38 MG/DL (ref 40–59)
LDLC SERPL CALC-MCNC: 54 MG/DL (ref ?–100)
M PROTEIN MFR SERPL ELPH: 8.4 G/DL (ref 6.4–8.2)
NONHDLC SERPL-MCNC: 74 MG/DL (ref ?–130)
OSMOLALITY SERPL CALC.SUM OF ELEC: 295 MOSM/KG (ref 275–295)
PATIENT FASTING Y/N/NP: YES
PATIENT FASTING Y/N/NP: YES
POTASSIUM SERPL-SCNC: 3.8 MMOL/L (ref 3.5–5.1)
SODIUM SERPL-SCNC: 137 MMOL/L (ref 136–145)
TRIGL SERPL-MCNC: 99 MG/DL (ref 30–149)
VIT B12 SERPL-MCNC: 855 PG/ML (ref 193–986)
VIT D+METAB SERPL-MCNC: 19.8 NG/ML (ref 30–100)
VLDLC SERPL CALC-MCNC: 20 MG/DL (ref 0–30)

## 2020-07-29 PROCEDURE — 80053 COMPREHEN METABOLIC PANEL: CPT

## 2020-07-29 PROCEDURE — 83036 HEMOGLOBIN GLYCOSYLATED A1C: CPT

## 2020-07-29 PROCEDURE — 82306 VITAMIN D 25 HYDROXY: CPT

## 2020-07-29 PROCEDURE — 36415 COLL VENOUS BLD VENIPUNCTURE: CPT

## 2020-07-29 PROCEDURE — 80061 LIPID PANEL: CPT

## 2020-07-29 PROCEDURE — 82607 VITAMIN B-12: CPT

## 2020-08-02 DIAGNOSIS — I10 ESSENTIAL HYPERTENSION WITH GOAL BLOOD PRESSURE LESS THAN 140/90: ICD-10-CM

## 2020-08-02 DIAGNOSIS — I10 ESSENTIAL HYPERTENSION: ICD-10-CM

## 2020-08-02 DIAGNOSIS — R60.0 BILATERAL LOWER EXTREMITY EDEMA: ICD-10-CM

## 2020-08-02 DIAGNOSIS — N18.30 CKD (CHRONIC KIDNEY DISEASE) STAGE 3, GFR 30-59 ML/MIN (HCC): ICD-10-CM

## 2020-08-03 ENCOUNTER — TELEPHONE (OUTPATIENT)
Dept: FAMILY MEDICINE CLINIC | Facility: CLINIC | Age: 70
End: 2020-08-03

## 2020-08-03 RX ORDER — CARVEDILOL 6.25 MG/1
6.25 TABLET ORAL DAILY
Qty: 30 TABLET | Refills: 2 | Status: SHIPPED | OUTPATIENT
Start: 2020-08-03 | End: 2020-09-17

## 2020-08-03 RX ORDER — LOSARTAN POTASSIUM 100 MG/1
100 TABLET ORAL DAILY
Qty: 30 TABLET | Refills: 2 | Status: SHIPPED | OUTPATIENT
Start: 2020-08-03 | End: 2020-11-02

## 2020-08-03 RX ORDER — AMLODIPINE BESYLATE 5 MG/1
5 TABLET ORAL DAILY
Qty: 30 TABLET | Refills: 2 | OUTPATIENT
Start: 2020-08-03

## 2020-08-03 NOTE — TELEPHONE ENCOUNTER
Hypertension Medications Protocol Passed8/3 12:36 PM   CMP or BMP in past 12 months    Last serum creatinine< 2.0    Appointment in past 6 or next 3 months     LOV 7/14/20     LAST LAB  7/29/20      LAST RX 5/4/20 30 with 2 refills     Next OV   Future Alexandro

## 2020-08-07 ENCOUNTER — PATIENT MESSAGE (OUTPATIENT)
Dept: FAMILY MEDICINE CLINIC | Facility: CLINIC | Age: 70
End: 2020-08-07

## 2020-08-07 NOTE — TELEPHONE ENCOUNTER
From: Jacek Lan,   Sent: 8/7/2020 10:53 AM CDT  Subject: Other    We do have an upcoming appointment on Monday to discuss her blood work results but I just wanted to inform you that her daily fasting blood sugar levels for most par

## 2020-08-10 ENCOUNTER — TELEPHONE (OUTPATIENT)
Dept: FAMILY MEDICINE CLINIC | Facility: CLINIC | Age: 70
End: 2020-08-10

## 2020-08-10 ENCOUNTER — OFFICE VISIT (OUTPATIENT)
Dept: FAMILY MEDICINE CLINIC | Facility: CLINIC | Age: 70
End: 2020-08-10
Payer: MEDICAID

## 2020-08-10 VITALS
RESPIRATION RATE: 16 BRPM | HEIGHT: 62 IN | DIASTOLIC BLOOD PRESSURE: 62 MMHG | BODY MASS INDEX: 26.94 KG/M2 | TEMPERATURE: 97 F | SYSTOLIC BLOOD PRESSURE: 126 MMHG | OXYGEN SATURATION: 99 % | HEART RATE: 66 BPM | WEIGHT: 146.38 LBS

## 2020-08-10 DIAGNOSIS — N18.4 CKD (CHRONIC KIDNEY DISEASE) STAGE 4, GFR 15-29 ML/MIN (HCC): ICD-10-CM

## 2020-08-10 DIAGNOSIS — E78.2 MIXED HYPERLIPIDEMIA: ICD-10-CM

## 2020-08-10 DIAGNOSIS — E55.9 VITAMIN D DEFICIENCY: ICD-10-CM

## 2020-08-10 DIAGNOSIS — I10 ESSENTIAL HYPERTENSION: ICD-10-CM

## 2020-08-10 DIAGNOSIS — E11.40 TYPE 2 DIABETES MELLITUS WITH DIABETIC NEUROPATHY, WITHOUT LONG-TERM CURRENT USE OF INSULIN (HCC): Primary | ICD-10-CM

## 2020-08-10 DIAGNOSIS — E53.8 VITAMIN B12 DEFICIENCY: ICD-10-CM

## 2020-08-10 DIAGNOSIS — E11.65 UNCONTROLLED TYPE 2 DIABETES MELLITUS WITH HYPERGLYCEMIA (HCC): ICD-10-CM

## 2020-08-10 LAB
GLUCOSE BLOOD: 340
TEST STRIP LOT #: NORMAL NUMERIC

## 2020-08-10 PROCEDURE — 99214 OFFICE O/P EST MOD 30 MIN: CPT | Performed by: FAMILY MEDICINE

## 2020-08-10 PROCEDURE — 3074F SYST BP LT 130 MM HG: CPT | Performed by: FAMILY MEDICINE

## 2020-08-10 PROCEDURE — 3078F DIAST BP <80 MM HG: CPT | Performed by: FAMILY MEDICINE

## 2020-08-10 PROCEDURE — 82962 GLUCOSE BLOOD TEST: CPT | Performed by: FAMILY MEDICINE

## 2020-08-10 PROCEDURE — 3008F BODY MASS INDEX DOCD: CPT | Performed by: FAMILY MEDICINE

## 2020-08-10 RX ORDER — ERGOCALCIFEROL 1.25 MG/1
50000 CAPSULE ORAL WEEKLY
Qty: 12 CAPSULE | Refills: 0 | Status: SHIPPED | OUTPATIENT
Start: 2020-08-10 | End: 2020-09-09

## 2020-08-10 NOTE — TELEPHONE ENCOUNTER
Triage call transferred. Spoke with pt's sister, Josh Avina (Lesvia Brewer per HIPAA), stating had encouraged pt to go to  over the weekend, however, pt refused. Pt adjusted diet, pushed fluids. Continues rx glimepiride 4 MG daily- no c/o SE.   Closely monitored BS:

## 2020-08-10 NOTE — PATIENT INSTRUCTIONS
Diabetes: Understanding Carbohydrates, Fats, and Protein  Food is a source of fuel and nourishment for your body. It’s also a source of pleasure. Having diabetes doesn’t mean you have to eat special foods or give up desserts.  Instead, your dietitian can · Polyunsaturated fats. These are mostly found in vegetable oils, such as corn, safflower, and soybean oils. They are found in some seeds, nuts, and fish. Polyunsaturated fats lower LDL (unhealthy) cholesterol.  So, choosing them instead of saturated fats is You give yourself insulin as a shot (injection). It's injected in the fatty layer under the skin (subcutaneous). Some people use an implanted device called an insulin pump. Others inject insulin using prefilled \"pens. \" Your healthcare team will teach you 2. Put the needle into the top of the bottle. Then push the plunger in all the way. This pushes air into the insulin bottle. 3. Turn the bottle and syringe upside down. The bottle will be on top. 4. Hold the needle and bottle straight up and down.  Check 10. Turn the bottle and syringe upside down. The bottle will be on top. 11. Hold the needle and bottle straight up and down. Check that the needle is in the insulin.   12. Pull back on the plunger until the end of the plunger is even with the total number · When you travel, take all of your diabetes supplies. Put them in a bag made to protect insulin from heat and cold. Always keep them with you. You will have what you need if there is a delay or your suitcase is lost.  · Never leave insulin in the car.  It

## 2020-08-10 NOTE — TELEPHONE ENCOUNTER
No UC/ ER note  [phone]  Lm for pt's sister, Tabitha [facility] per HIPAA), to inform did not see UC/ ER note regarding f/u on pt's consistently high BS readings- f/u on pt's condition update. To call back at the office to further discuss.

## 2020-08-11 NOTE — PROGRESS NOTES
HPI:   Kendy Her is a 65year old female who presents for f/u on chronic conditions and recent labs. DM2, blood glucose levels have been running high over the past week, at times over 200.  She adjusted her diet and numbers have come down to 170-190s t Glucose Blood (ONETOUCH ULTRA BLUE) In IZEA daily. 100 strip 1   • aspirin 81 MG Oral Tab Take 1 tablet (81 mg total) by mouth daily.  90 tablet 5       Wt Readings from Last 6 Encounters:  08/10/20 : 146 lb 6 oz (66.4 kg)  07/14/20 : 148 lb 6 o file      Highest education level: Not on file    Occupational History      Not on file    Social Needs      Financial resource strain: Not on file      Food insecurity:        Worry: Not on file        Inability: Not on file      Transportation needs: RRR   GI: soft, nt/nd, +BS in all four quadrants, no r/r/g  EXTREMITIES: no cyanosis, clubbing, 1+ BLE edema    ASSESSMENT AND PLAN:   Jean Duckworth is a 65year old female who presents for:  1.  Type 2 diabetes mellitus with diabetic neuropathy, without lo

## 2020-08-21 ENCOUNTER — HOSPITAL ENCOUNTER (EMERGENCY)
Facility: HOSPITAL | Age: 70
Discharge: HOME | End: 2020-08-21
Attending: EMERGENCY MEDICINE
Payer: MEDICAID

## 2020-08-21 ENCOUNTER — APPOINTMENT (OUTPATIENT)
Dept: ULTRASOUND IMAGING | Facility: HOSPITAL | Age: 70
End: 2020-08-21
Attending: EMERGENCY MEDICINE
Payer: MEDICAID

## 2020-08-21 ENCOUNTER — PATIENT MESSAGE (OUTPATIENT)
Dept: FAMILY MEDICINE CLINIC | Facility: CLINIC | Age: 70
End: 2020-08-21

## 2020-08-21 VITALS
DIASTOLIC BLOOD PRESSURE: 66 MMHG | TEMPERATURE: 98 F | OXYGEN SATURATION: 100 % | HEART RATE: 70 BPM | SYSTOLIC BLOOD PRESSURE: 145 MMHG | WEIGHT: 145 LBS | RESPIRATION RATE: 16 BRPM | HEIGHT: 63 IN | BODY MASS INDEX: 25.69 KG/M2

## 2020-08-21 DIAGNOSIS — M79.661 RIGHT CALF PAIN: Primary | ICD-10-CM

## 2020-08-21 PROCEDURE — 99284 EMERGENCY DEPT VISIT MOD MDM: CPT

## 2020-08-21 PROCEDURE — 93971 EXTREMITY STUDY: CPT | Performed by: EMERGENCY MEDICINE

## 2020-08-21 RX ORDER — HYDROCODONE BITARTRATE AND ACETAMINOPHEN 5; 325 MG/1; MG/1
1-2 TABLET ORAL EVERY 6 HOURS PRN
Qty: 20 TABLET | Refills: 0 | Status: SHIPPED | OUTPATIENT
Start: 2020-08-21 | End: 2020-08-28

## 2020-08-21 NOTE — TELEPHONE ENCOUNTER
From: Jenna Mustafa Pac, DO  Sent: 8/21/2020 10:31 AM CDT  Subject: Other    Hello. Im feeling pain in my right leg for over a week now. Its mostly in the shin and thigh area.  Tylenol helps alleviate the pain temporarily but it does not comp

## 2020-08-21 NOTE — TELEPHONE ENCOUNTER
Noted Last read by Nikos Virgen at 3:13 PM on 8/21/2020.     [phone]  Called and spoke with pt's daughter, Fabien Turner [facility] per HIPAA), confirmed received [employer] message and will be taking pt to  now. No further questions or concerns.  Daughter Larry Smiley

## 2020-08-22 NOTE — ED PROVIDER NOTES
Patient Seen in: [facility] Emergency Department      History   Patient presents with:  Pain    Stated Complaint: Sent by doctor to r/o DVT to right leg. Swelling and pain for a week.      HPI    12-year-old female presents to the emergency departmen thyromegaly. Lungs are clear to auscultation. Heart exam: Normal S1-S2 without extra sounds or murmurs. Regular rate and rhythm. Abdomen is nontender. Skin is dry without rashes or lesions. Extremities:  There is mild edema of the left leg with tender

## 2020-08-24 ENCOUNTER — PATIENT MESSAGE (OUTPATIENT)
Dept: FAMILY MEDICINE CLINIC | Facility: CLINIC | Age: 70
End: 2020-08-24

## 2020-08-25 ENCOUNTER — TELEPHONE (OUTPATIENT)
Dept: FAMILY MEDICINE CLINIC | Facility: CLINIC | Age: 70
End: 2020-08-25

## 2020-08-25 ENCOUNTER — VIRTUAL PHONE E/M (OUTPATIENT)
Dept: FAMILY MEDICINE CLINIC | Facility: CLINIC | Age: 70
End: 2020-08-25
Payer: MEDICAID

## 2020-08-25 DIAGNOSIS — N18.4 CKD (CHRONIC KIDNEY DISEASE) STAGE 4, GFR 15-29 ML/MIN (HCC): ICD-10-CM

## 2020-08-25 DIAGNOSIS — B02.9 HERPES ZOSTER WITHOUT COMPLICATION: Primary | ICD-10-CM

## 2020-08-25 PROCEDURE — 99214 OFFICE O/P EST MOD 30 MIN: CPT | Performed by: FAMILY MEDICINE

## 2020-08-25 RX ORDER — VALACYCLOVIR HYDROCHLORIDE 1 G/1
1 TABLET, FILM COATED ORAL DAILY
Qty: 7 TABLET | Refills: 0 | Status: SHIPPED | OUTPATIENT
Start: 2020-08-25 | End: 2020-09-01 | Stop reason: ALTCHOICE

## 2020-08-25 NOTE — TELEPHONE ENCOUNTER
From: Torsten Wayne DO  Sent: 8/24/2020 9:07 PM CDT  Subject: Other    Hello. I developed this rash on my right leg on the outer side. It seems to have worsen in the past few days. Can you recommend a medication for this please?  I feel

## 2020-08-25 NOTE — TELEPHONE ENCOUNTER
Is pt able to come in today for ov?   If not, may schedule for video visit for today, I believe I have an opening

## 2020-08-25 NOTE — TELEPHONE ENCOUNTER
Routing to MD for review, please see picture. Would you like to video visit with the patient or how would you like to proceed?

## 2020-08-25 NOTE — PROGRESS NOTES
Virtual Telephone Check-In    Jacek Bermudez verbally consents to a Virtual/Telephone Check-In visit on 08/25/20. Patient has been referred to the [facility] website at [email or web address] to review the yearly Consent to Treat document.     Patient Bard Nur mouth daily. 30 tablet 2   • glimepiride 4 MG Oral Tab Take 1 tablet (4 mg total) by mouth every morning before breakfast. 90 tablet 0   • amLODIPine Besylate 5 MG Oral Tab Take 1 tablet (5 mg total) by mouth daily.  30 tablet 2   • furosemide 20 MG Oral Ta daily for 7 days.        Imaging & Referrals:  None       YO#[id number]

## 2020-08-25 NOTE — TELEPHONE ENCOUNTER
Pt's daughter Kaitlin Clark) [phone] returned call she is not able to bring pt in today is requesting an appt (video visit) for Friday w/  please advise?

## 2020-08-25 NOTE — TELEPHONE ENCOUNTER
Attempted to call both numbers on file. Cell had someone elses name on it but left message that this was the nurse calling from doctors office about [employer] message. Asked to call back. Home number no VM available.     [employer] sent asking if able to DR KAREL CABRERA [facility]

## 2020-08-27 ENCOUNTER — TELEPHONE (OUTPATIENT)
Dept: FAMILY MEDICINE CLINIC | Facility: CLINIC | Age: 70
End: 2020-08-27

## 2020-08-27 NOTE — TELEPHONE ENCOUNTER
Spoke with Patients daughter and she said she will try on scheduling annual diabetic eye exam next week .

## 2020-09-01 ENCOUNTER — OFFICE VISIT (OUTPATIENT)
Dept: NEPHROLOGY | Facility: CLINIC | Age: 70
End: 2020-09-01
Payer: MEDICAID

## 2020-09-01 VITALS
DIASTOLIC BLOOD PRESSURE: 76 MMHG | SYSTOLIC BLOOD PRESSURE: 122 MMHG | WEIGHT: 146.63 LBS | OXYGEN SATURATION: 99 % | HEART RATE: 70 BPM | HEIGHT: 62 IN | RESPIRATION RATE: 14 BRPM | BODY MASS INDEX: 26.98 KG/M2

## 2020-09-01 DIAGNOSIS — N18.30 CKD (CHRONIC KIDNEY DISEASE) STAGE 3, GFR 30-59 ML/MIN (HCC): Primary | ICD-10-CM

## 2020-09-01 DIAGNOSIS — R60.9 EDEMA, UNSPECIFIED TYPE: ICD-10-CM

## 2020-09-01 DIAGNOSIS — I10 ESSENTIAL HYPERTENSION: ICD-10-CM

## 2020-09-01 DIAGNOSIS — E11.21 DIABETIC NEPHROPATHY ASSOCIATED WITH TYPE 2 DIABETES MELLITUS (HCC): ICD-10-CM

## 2020-09-01 PROCEDURE — 99214 OFFICE O/P EST MOD 30 MIN: CPT | Performed by: INTERNAL MEDICINE

## 2020-09-01 PROCEDURE — 3008F BODY MASS INDEX DOCD: CPT | Performed by: INTERNAL MEDICINE

## 2020-09-01 PROCEDURE — 3078F DIAST BP <80 MM HG: CPT | Performed by: INTERNAL MEDICINE

## 2020-09-01 PROCEDURE — 3074F SYST BP LT 130 MM HG: CPT | Performed by: INTERNAL MEDICINE

## 2020-09-01 NOTE — PROGRESS NOTES
Nephrology Progress Note      ASSESSMENT/PLAN:        1) CKD 3- renal function has been relatively stable over the past 3 yrs with a serum creatinine of 1.7 to 2.0 mg/dL; this is presumably due to diabetic and hypertensive nephropathy as evaluation for [facility] Medical History:   Diagnosis Date   • Cataract    • Type II or unspecified type diabetes mellitus without mention of complication, not stated as uncontrolled    • Unspecified essential hypertension        PSH:  Past Surgical History:   Procedure Laterality 90 tablet 5       Allergies:  No Known Allergies    Social History:  Social History    Socioeconomic History      Marital status:        Spouse name: Not on file      Number of children: Not on file      Years of education: Not on file      Highest e

## 2020-09-16 ENCOUNTER — PATIENT MESSAGE (OUTPATIENT)
Dept: NEPHROLOGY | Facility: CLINIC | Age: 70
End: 2020-09-16

## 2020-09-16 DIAGNOSIS — R60.0 BILATERAL LOWER EXTREMITY EDEMA: ICD-10-CM

## 2020-09-16 DIAGNOSIS — I10 ESSENTIAL HYPERTENSION WITH GOAL BLOOD PRESSURE LESS THAN 140/90: ICD-10-CM

## 2020-09-17 RX ORDER — CARVEDILOL 6.25 MG/1
6.25 TABLET ORAL 2 TIMES DAILY WITH MEALS
Qty: 60 TABLET | Refills: 3 | Status: SHIPPED | OUTPATIENT
Start: 2020-09-17 | End: 2021-01-18

## 2020-09-17 NOTE — TELEPHONE ENCOUNTER
Carvedilol 6.25mg Take 1 tablet BID. #60 3 refills    LOV-9/1/20    Medication was increased as copied below at LOV per .    increase carvedilol to 6.25 mg bid     Pended at new dose.

## 2020-09-17 NOTE — TELEPHONE ENCOUNTER
From: Christiane Ham  To: Robinson Reynolds MD  Sent: 9/16/2020 8:40 PM CDT  Subject: Prescription Question    Hi Dr. Cristofer Thayer. My [facility] pharmacy in [place] has not received the new dose of carvedilol you prescribed on our recent visit.  I've asked them to con

## 2020-09-30 DIAGNOSIS — I10 ESSENTIAL HYPERTENSION: ICD-10-CM

## 2020-09-30 DIAGNOSIS — R60.0 BILATERAL LOWER EXTREMITY EDEMA: ICD-10-CM

## 2020-09-30 RX ORDER — FUROSEMIDE 40 MG/1
40 TABLET ORAL DAILY
Qty: 90 TABLET | Refills: 1 | Status: SHIPPED | OUTPATIENT
Start: 2020-09-30 | End: 2021-04-05

## 2020-09-30 RX ORDER — FUROSEMIDE 20 MG/1
TABLET ORAL
Qty: 90 TABLET | Refills: 0 | OUTPATIENT
Start: 2020-09-30

## 2020-09-30 NOTE — TELEPHONE ENCOUNTER
The original prescription was reordered on 9/30/2020 by Riky Chase MD. Renewing this prescription may not be appropriate.   DENIED AS DUPLICATE, INSTRUCTIONS TO PHARMACY TO CHECK FOR NEW/ REFILLS

## 2020-10-09 ENCOUNTER — PATIENT OUTREACH (OUTPATIENT)
Dept: FAMILY MEDICINE CLINIC | Facility: CLINIC | Age: 70
End: 2020-10-09

## 2020-10-09 DIAGNOSIS — E78.2 MIXED HYPERLIPIDEMIA: ICD-10-CM

## 2020-10-09 DIAGNOSIS — E11.40 TYPE 2 DIABETES MELLITUS WITH DIABETIC NEUROPATHY, WITHOUT LONG-TERM CURRENT USE OF INSULIN (HCC): ICD-10-CM

## 2020-10-09 RX ORDER — ATORVASTATIN CALCIUM 20 MG/1
TABLET, FILM COATED ORAL
Qty: 30 TABLET | Refills: 0 | Status: SHIPPED | OUTPATIENT
Start: 2020-10-09 | End: 2020-11-02

## 2020-10-09 NOTE — TELEPHONE ENCOUNTER
LOV 8/10/20    LAST LAB 7/9/20    LAST RX   ATORVASTATIN 20 MG Oral Tab 30 tablet 2 6/29/2020    Sig:   TAKE 1 TABLET BY MOUTH AT BEDTIME     Route:   (none)           Next OV No future appointments.     PROTOCOL     Cholesterol Medication Protocol [id number]

## 2020-11-01 DIAGNOSIS — I10 ESSENTIAL HYPERTENSION WITH GOAL BLOOD PRESSURE LESS THAN 140/90: ICD-10-CM

## 2020-11-02 ENCOUNTER — PATIENT MESSAGE (OUTPATIENT)
Dept: FAMILY MEDICINE CLINIC | Facility: CLINIC | Age: 70
End: 2020-11-02

## 2020-11-02 DIAGNOSIS — E11.40 TYPE 2 DIABETES MELLITUS WITH DIABETIC NEUROPATHY, WITHOUT LONG-TERM CURRENT USE OF INSULIN (HCC): ICD-10-CM

## 2020-11-02 DIAGNOSIS — I10 ESSENTIAL HYPERTENSION: ICD-10-CM

## 2020-11-02 DIAGNOSIS — R60.0 BILATERAL LOWER EXTREMITY EDEMA: ICD-10-CM

## 2020-11-02 DIAGNOSIS — E78.2 MIXED HYPERLIPIDEMIA: ICD-10-CM

## 2020-11-02 DIAGNOSIS — N18.30 CKD (CHRONIC KIDNEY DISEASE) STAGE 3, GFR 30-59 ML/MIN (HCC): ICD-10-CM

## 2020-11-02 RX ORDER — AMLODIPINE BESYLATE 5 MG/1
TABLET ORAL
Qty: 30 TABLET | Refills: 2 | Status: SHIPPED | OUTPATIENT
Start: 2020-11-02 | End: 2020-11-02

## 2020-11-02 RX ORDER — LOSARTAN POTASSIUM 100 MG/1
TABLET ORAL
Qty: 30 TABLET | Refills: 2 | Status: SHIPPED | OUTPATIENT
Start: 2020-11-02 | End: 2021-02-05

## 2020-11-02 RX ORDER — ATORVASTATIN CALCIUM 20 MG/1
TABLET, FILM COATED ORAL
Qty: 30 TABLET | Refills: 2 | Status: SHIPPED | OUTPATIENT
Start: 2020-11-02 | End: 2021-02-05

## 2020-11-02 NOTE — TELEPHONE ENCOUNTER
Hypertension Medications Protocol [id number]/01/2020 02:39 PM   CMP or BMP in past 12 months Protocol Details    Last serum creatinine< 2.0     Appointment in past 6 or next 3 months      LOV 8/10/20     LAST LAB  7/29/20      LAST RX  8/3/20 30 with 2 refills     Next OV No future appointments. PROTOCOL pass     Please schedule annual physical . Thank you .

## 2020-11-02 NOTE — TELEPHONE ENCOUNTER
From: Aneesh Kellogg  Lashawn Galo DO  Sent: 11/2/2020 3:04 PM CST  Subject: Prescription Question    Hi. Thank you for renewing my meds but I still don't see the losartan renewal. I dont take amlodopine anymore as per doctor's orders.  Please can you

## 2020-11-02 NOTE — TELEPHONE ENCOUNTER
Cholesterol Medication Protocol Pxvfvv2311/02/2020 02:55 PM   ALT < 80 Protocol Details    ALT resulted within past year     Lipid panel within past 12 months     Appointment within past 12 or next 3 months      LOV 8/10/20     LAST LAB  7/29/20     LAST RX

## 2020-11-15 DIAGNOSIS — E11.40 TYPE 2 DIABETES MELLITUS WITH DIABETIC NEUROPATHY, WITHOUT LONG-TERM CURRENT USE OF INSULIN (HCC): ICD-10-CM

## 2020-11-16 RX ORDER — BLOOD SUGAR DIAGNOSTIC
STRIP MISCELLANEOUS
Qty: 100 EACH | Refills: 0 | Status: SHIPPED | OUTPATIENT
Start: 2020-11-16 | End: 2021-03-11

## 2020-11-16 RX ORDER — LANCETS 33 GAUGE
EACH MISCELLANEOUS
Qty: 100 EACH | Refills: 0 | Status: SHIPPED | OUTPATIENT
Start: 2020-11-16 | End: 2020-11-20

## 2020-11-18 ENCOUNTER — PATIENT MESSAGE (OUTPATIENT)
Dept: FAMILY MEDICINE CLINIC | Facility: CLINIC | Age: 70
End: 2020-11-18

## 2020-11-18 DIAGNOSIS — E11.65 UNCONTROLLED TYPE 2 DIABETES MELLITUS WITH HYPERGLYCEMIA (HCC): ICD-10-CM

## 2020-11-18 DIAGNOSIS — E11.40 TYPE 2 DIABETES MELLITUS WITH DIABETIC NEUROPATHY, WITHOUT LONG-TERM CURRENT USE OF INSULIN (HCC): ICD-10-CM

## 2020-11-18 DIAGNOSIS — I10 ESSENTIAL HYPERTENSION WITH GOAL BLOOD PRESSURE LESS THAN 140/90: ICD-10-CM

## 2020-11-18 DIAGNOSIS — R60.0 BILATERAL LOWER EXTREMITY EDEMA: ICD-10-CM

## 2020-11-19 RX ORDER — PEN NEEDLE, DIABETIC 31 GX5/16"
1 NEEDLE, DISPOSABLE MISCELLANEOUS DAILY
Qty: 100 EACH | Refills: 2 | Status: SHIPPED | OUTPATIENT
Start: 2020-11-19 | End: 2021-11-19

## 2020-11-19 RX ORDER — CARVEDILOL 6.25 MG/1
6.25 TABLET ORAL 2 TIMES DAILY WITH MEALS
Qty: 60 TABLET | Refills: 3 | OUTPATIENT
Start: 2020-11-19

## 2020-11-19 NOTE — TELEPHONE ENCOUNTER
Diabetic Supplies Protocol [id number]/18/2020 11:34 PM   Appointment in the past 12 or next 3 months     insulin glargine (BASAGLAR KWIKPEN) 100 UNIT/ML Subcutaneous Solution Pen-injector          Sig: Inject 15 Units into the skin every morning.     Disp:  2

## 2020-11-20 DIAGNOSIS — E11.65 UNCONTROLLED TYPE 2 DIABETES MELLITUS WITH HYPERGLYCEMIA (HCC): ICD-10-CM

## 2020-11-20 DIAGNOSIS — E11.40 TYPE 2 DIABETES MELLITUS WITH DIABETIC NEUROPATHY, WITHOUT LONG-TERM CURRENT USE OF INSULIN (HCC): ICD-10-CM

## 2020-11-20 RX ORDER — LANCETS 33 GAUGE
1 EACH MISCELLANEOUS DAILY
Qty: 100 EACH | Refills: 0 | Status: SHIPPED | OUTPATIENT
Start: 2020-11-20 | End: 2021-07-26

## 2020-11-20 NOTE — TELEPHONE ENCOUNTER
Pt's daughter left a long voicemail asking why this has not been refilled yet. . She thinsk there could be a problem with the way the script was written - - Quantity issue.

## 2020-11-23 NOTE — TELEPHONE ENCOUNTER
Lancets and insulin pen was sent to pharmacy on 11/19 and 11/20. carvedilol 6.25 MG Oral Tab 60 tablet 3 9/17/2020     Has enough of supply left.

## 2020-11-24 RX ORDER — INSULIN GLARGINE 100 [IU]/ML
INJECTION, SOLUTION SUBCUTANEOUS
Qty: 6 ML | Refills: 0 | Status: SHIPPED | OUTPATIENT
Start: 2020-11-24 | End: 2021-04-13

## 2020-11-24 NOTE — TELEPHONE ENCOUNTER
LOV 8/10/2020    LAST LAB 7-29-20    LAST RX 8-11-20 2*2    Next OV No future appointments.     PROTOCOL  Name from pharmacy: Lantus SoloStar 100 UNIT/ML Subcutaneous Solution Pen-injector          Will file in chart as: LANTUS SOLOSTAR 100 UNIT/ML Subcutan

## 2021-01-13 ENCOUNTER — PATIENT MESSAGE (OUTPATIENT)
Dept: NEPHROLOGY | Facility: CLINIC | Age: 71
End: 2021-01-13

## 2021-01-13 ENCOUNTER — TELEPHONE (OUTPATIENT)
Dept: FAMILY MEDICINE CLINIC | Facility: CLINIC | Age: 71
End: 2021-01-13

## 2021-01-13 RX ORDER — AMLODIPINE BESYLATE 5 MG/1
5 TABLET ORAL DAILY
Qty: 30 TABLET | Refills: 11 | Status: SHIPPED | OUTPATIENT
Start: 2021-01-13

## 2021-01-13 NOTE — TELEPHONE ENCOUNTER
Dr. Kaye Lewis,  Please advise, do you want to see patient in office Friday for B/P check or wait till she has been on Amlodipine longer?     Patient also sent message to Dr. Mina Naqvi:    Raisa Middleton MD  to Natalbany Luc    1/13/21 3:08 PM  Hello- please resum

## 2021-01-13 NOTE — TELEPHONE ENCOUNTER
From: Melva Daugherty  To: Rosibel Urena MD  Sent: 1/13/2021 2:44 PM CST  Subject: Other    Hi! I have severe toothache and have to get the tooth extracted but the dentist did not perform the procedure because my BP was very high at that time (205/90).  They

## 2021-01-13 NOTE — TELEPHONE ENCOUNTER
Pt had scheduled tooth extraction yesterday but BP was around 205/87 and they would not perform the extraction. Pt cannot eat because of the pain she is in. She is taking amoxicillin and ibuprofen.  Daughter has been checking BP today and it has been around

## 2021-01-14 NOTE — TELEPHONE ENCOUNTER
VMML for patient's daughter requesting call back to schedule appt for B/P monitoring early next week after she has been on the amlodipine for several days. Instructed to monitor B/P at home and if it remains high, call the office for further instructions.

## 2021-01-14 NOTE — TELEPHONE ENCOUNTER
Pt will need to be on amlodipine for a few days prior to an ov to see if that helps with her bp. May schedule early next week.    Advise daughter to monitor bp, if any concerning numbers may call back or take to [facility]

## 2021-01-14 NOTE — TELEPHONE ENCOUNTER
Patient's daughter returned call. States patient started on amlodipine yesterday. She has not spoken to her yet today to see what her B/P is. Has another dental appt tomorrow. Advised to continue amlodipine, monitor B/P.  If high and patient has symptom

## 2021-01-17 DIAGNOSIS — R60.0 BILATERAL LOWER EXTREMITY EDEMA: ICD-10-CM

## 2021-01-17 DIAGNOSIS — E11.40 TYPE 2 DIABETES MELLITUS WITH DIABETIC NEUROPATHY, WITHOUT LONG-TERM CURRENT USE OF INSULIN (HCC): ICD-10-CM

## 2021-01-17 DIAGNOSIS — I10 ESSENTIAL HYPERTENSION WITH GOAL BLOOD PRESSURE LESS THAN 140/90: ICD-10-CM

## 2021-01-18 RX ORDER — GLIMEPIRIDE 4 MG/1
4 TABLET ORAL
Qty: 30 TABLET | Refills: 0 | Status: SHIPPED | OUTPATIENT
Start: 2021-01-18 | End: 2021-02-18

## 2021-01-18 RX ORDER — CARVEDILOL 6.25 MG/1
6.25 TABLET ORAL 2 TIMES DAILY WITH MEALS
Qty: 180 TABLET | Refills: 1 | Status: SHIPPED | OUTPATIENT
Start: 2021-01-18 | End: 2021-07-26

## 2021-01-18 NOTE — TELEPHONE ENCOUNTER
Diabetic Medication Protocol [id number]/17/2021 04:11 PM   Last HgBA1C < 7.5 Protocol Details    HgBA1C procedure resulted in past 6 months     Microalbumin procedure in past 12 months or taking ACE/ARB     Appointment in past 6 or next 3 months      LOV 8/25/20 virtual visit     LAST LAB  7/29/20     LAST RX   7/23/20 90 tabs 0 refills      Next OV   Future Appointments   Date Time Provider Jaison Suad   1/19/2021  1:00 PM Elliot Rangel,  [facility]         PROTOCOL failed     Please approve or deny request . Thank you .

## 2021-01-19 ENCOUNTER — OFFICE VISIT (OUTPATIENT)
Dept: FAMILY MEDICINE CLINIC | Facility: CLINIC | Age: 71
End: 2021-01-19
Payer: MEDICAID

## 2021-01-19 VITALS
BODY MASS INDEX: 27.88 KG/M2 | TEMPERATURE: 97 F | HEART RATE: 64 BPM | WEIGHT: 151.5 LBS | OXYGEN SATURATION: 98 % | SYSTOLIC BLOOD PRESSURE: 134 MMHG | HEIGHT: 62 IN | RESPIRATION RATE: 16 BRPM | DIASTOLIC BLOOD PRESSURE: 74 MMHG

## 2021-01-19 DIAGNOSIS — I10 ESSENTIAL HYPERTENSION: Primary | ICD-10-CM

## 2021-01-19 DIAGNOSIS — E78.2 MIXED HYPERLIPIDEMIA: ICD-10-CM

## 2021-01-19 DIAGNOSIS — E11.40 TYPE 2 DIABETES MELLITUS WITH DIABETIC NEUROPATHY, WITHOUT LONG-TERM CURRENT USE OF INSULIN (HCC): ICD-10-CM

## 2021-01-19 DIAGNOSIS — R60.0 BILATERAL LOWER EXTREMITY EDEMA: ICD-10-CM

## 2021-01-19 DIAGNOSIS — E55.9 VITAMIN D DEFICIENCY: ICD-10-CM

## 2021-01-19 DIAGNOSIS — Z00.00 LABORATORY EXAM ORDERED AS PART OF ROUTINE GENERAL MEDICAL EXAMINATION: ICD-10-CM

## 2021-01-19 PROCEDURE — 3008F BODY MASS INDEX DOCD: CPT | Performed by: FAMILY MEDICINE

## 2021-01-19 PROCEDURE — 3075F SYST BP GE 130 - 139MM HG: CPT | Performed by: FAMILY MEDICINE

## 2021-01-19 PROCEDURE — 3078F DIAST BP <80 MM HG: CPT | Performed by: FAMILY MEDICINE

## 2021-01-19 PROCEDURE — 99214 OFFICE O/P EST MOD 30 MIN: CPT | Performed by: FAMILY MEDICINE

## 2021-01-19 RX ORDER — IBUPROFEN 600 MG/1
TABLET ORAL
COMMUNITY
Start: 2021-01-12

## 2021-01-19 RX ORDER — AMOXICILLIN 500 MG/1
500 CAPSULE ORAL 3 TIMES DAILY
COMMUNITY
Start: 2021-01-12 | End: 2021-02-12

## 2021-01-19 NOTE — PROGRESS NOTES
Iliana Salas is a 65year old female. HPI:   Patient presents with daughter for f/u on chronic conditions. HTN, had been having elevated bp readings at home last week.  Was in pain due to a tooth, had extraction done over the weekend and notes improve total) by mouth every morning before breakfast. 30 tablet 0   • amLODIPine Besylate 5 MG Oral Tab Take 1 tablet (5 mg total) by mouth daily.  30 tablet 11   • LANTUS SOLOSTAR 100 UNIT/ML Subcutaneous Solution Pen-injector INJECT 15 UNITS SUBCUTANEOUSLY IN T well nourished,in no apparent distress  SKIN: no rashes,no suspicious lesions  HEENT: atraumatic, normocephalic   NECK: supple   LUNGS: clear to auscultation, no w/r/r  CARDIO: RRR without murmur  EXTREMITIES: no cyanosis, clubbing, 1-2+ pedal edema (chron

## 2021-01-19 NOTE — PATIENT INSTRUCTIONS
Diabetes: Understanding Carbohydrates, Fats, and Protein  Food is a source of fuel and nourishment for your body. It’s also a source of pleasure. Having diabetes doesn’t mean you have to eat special foods or give up desserts.  Instead, your dietitian can · Polyunsaturated fats. These are mostly found in vegetable oils, such as corn, safflower, and soybean oils. They are found in some seeds, nuts, and fish. Polyunsaturated fats lower LDL (unhealthy) cholesterol.  So, choosing them instead of saturated fats is High blood pressure (hypertension) is often called the silent killer. This is because many people who have it, don’t know it. It can be very dangerous. High blood pressure can raise your risk of heart attack, stroke, heart disease, and heart failure.  Contr · When you go to a restaurant, ask that your meal be prepared with no added salt. Stay at a healthy weight  · Ask your healthcare provider how many calories to eat a day. Then stick to that number.   · Ask your healthcare provider what weight range is he © 2940-2347 [employer] [address]. [address] [facility], [facility] [place]. All rights reserved. This information is not intended as a substitute for professional medical care. Always follow your healthcare professional's instructions.

## 2021-01-28 ENCOUNTER — LAB ENCOUNTER (OUTPATIENT)
Dept: LAB | Age: 71
End: 2021-01-28
Attending: FAMILY MEDICINE
Payer: MEDICAID

## 2021-01-28 DIAGNOSIS — E11.40 TYPE 2 DIABETES MELLITUS WITH DIABETIC NEUROPATHY, WITHOUT LONG-TERM CURRENT USE OF INSULIN (HCC): ICD-10-CM

## 2021-01-28 DIAGNOSIS — I10 ESSENTIAL HYPERTENSION: ICD-10-CM

## 2021-01-28 DIAGNOSIS — Z00.00 LABORATORY EXAM ORDERED AS PART OF ROUTINE GENERAL MEDICAL EXAMINATION: ICD-10-CM

## 2021-01-28 DIAGNOSIS — E78.2 MIXED HYPERLIPIDEMIA: ICD-10-CM

## 2021-01-28 DIAGNOSIS — E55.9 VITAMIN D DEFICIENCY: ICD-10-CM

## 2021-01-28 LAB
ALBUMIN SERPL-MCNC: 3.5 G/DL (ref 3.4–5)
ALBUMIN/GLOB SERPL: 0.8 {RATIO} (ref 1–2)
ALP LIVER SERPL-CCNC: 99 U/L
ALT SERPL-CCNC: 22 U/L
ANION GAP SERPL CALC-SCNC: 4 MMOL/L (ref 0–18)
AST SERPL-CCNC: 18 U/L (ref 15–37)
BASOPHILS # BLD AUTO: 0.08 X10(3) UL (ref 0–0.2)
BASOPHILS NFR BLD AUTO: 1.3 %
BILIRUB SERPL-MCNC: 0.5 MG/DL (ref 0.1–2)
BUN BLD-MCNC: 25 MG/DL (ref 7–18)
BUN/CREAT SERPL: 19.4 (ref 10–20)
CALCIUM BLD-MCNC: 9.6 MG/DL (ref 8.5–10.1)
CHLORIDE SERPL-SCNC: 109 MMOL/L (ref 98–112)
CHOLEST SMN-MCNC: 115 MG/DL (ref ?–200)
CO2 SERPL-SCNC: 30 MMOL/L (ref 21–32)
CREAT BLD-MCNC: 1.29 MG/DL
CREAT UR-SCNC: 113 MG/DL
DEPRECATED RDW RBC AUTO: 50.4 FL (ref 35.1–46.3)
EOSINOPHIL # BLD AUTO: 0.44 X10(3) UL (ref 0–0.7)
EOSINOPHIL NFR BLD AUTO: 7.3 %
ERYTHROCYTE [DISTWIDTH] IN BLOOD BY AUTOMATED COUNT: 16.1 % (ref 11–15)
EST. AVERAGE GLUCOSE BLD GHB EST-MCNC: 206 MG/DL (ref 68–126)
GLOBULIN PLAS-MCNC: 4.3 G/DL (ref 2.8–4.4)
GLUCOSE BLD-MCNC: 92 MG/DL (ref 70–99)
HBA1C MFR BLD HPLC: 8.8 % (ref ?–5.7)
HCT VFR BLD AUTO: 36.7 %
HDLC SERPL-MCNC: 40 MG/DL (ref 40–59)
HGB BLD-MCNC: 11.5 G/DL
IMM GRANULOCYTES # BLD AUTO: 0.02 X10(3) UL (ref 0–1)
IMM GRANULOCYTES NFR BLD: 0.3 %
LDLC SERPL CALC-MCNC: 54 MG/DL (ref ?–100)
LYMPHOCYTES # BLD AUTO: 1.43 X10(3) UL (ref 1–4)
LYMPHOCYTES NFR BLD AUTO: 23.6 %
M PROTEIN MFR SERPL ELPH: 7.8 G/DL (ref 6.4–8.2)
MCH RBC QN AUTO: 26.9 PG (ref 26–34)
MCHC RBC AUTO-ENTMCNC: 31.3 G/DL (ref 31–37)
MCV RBC AUTO: 85.7 FL
MICROALBUMIN UR-MCNC: 4.7 MG/DL
MICROALBUMIN/CREAT 24H UR-RTO: 41.6 UG/MG (ref ?–30)
MONOCYTES # BLD AUTO: 0.56 X10(3) UL (ref 0.1–1)
MONOCYTES NFR BLD AUTO: 9.3 %
NEUTROPHILS # BLD AUTO: 3.52 X10 (3) UL (ref 1.5–7.7)
NEUTROPHILS # BLD AUTO: 3.52 X10(3) UL (ref 1.5–7.7)
NEUTROPHILS NFR BLD AUTO: 58.2 %
NONHDLC SERPL-MCNC: 75 MG/DL (ref ?–130)
OSMOLALITY SERPL CALC.SUM OF ELEC: 300 MOSM/KG (ref 275–295)
PATIENT FASTING Y/N/NP: YES
PATIENT FASTING Y/N/NP: YES
PLATELET # BLD AUTO: 280 10(3)UL (ref 150–450)
POTASSIUM SERPL-SCNC: 3.6 MMOL/L (ref 3.5–5.1)
RBC # BLD AUTO: 4.28 X10(6)UL
SODIUM SERPL-SCNC: 143 MMOL/L (ref 136–145)
TRIGL SERPL-MCNC: 103 MG/DL (ref 30–149)
TSI SER-ACNC: 1.41 MIU/ML (ref 0.36–3.74)
VIT D+METAB SERPL-MCNC: 32.4 NG/ML (ref 30–100)
VLDLC SERPL CALC-MCNC: 21 MG/DL (ref 0–30)
WBC # BLD AUTO: 6.1 X10(3) UL (ref 4–11)

## 2021-01-28 PROCEDURE — 84443 ASSAY THYROID STIM HORMONE: CPT

## 2021-01-28 PROCEDURE — 83036 HEMOGLOBIN GLYCOSYLATED A1C: CPT

## 2021-01-28 PROCEDURE — 82306 VITAMIN D 25 HYDROXY: CPT

## 2021-01-28 PROCEDURE — 3061F NEG MICROALBUMINURIA REV: CPT | Performed by: INTERNAL MEDICINE

## 2021-01-28 PROCEDURE — 80053 COMPREHEN METABOLIC PANEL: CPT

## 2021-01-28 PROCEDURE — 3060F POS MICROALBUMINURIA REV: CPT | Performed by: INTERNAL MEDICINE

## 2021-01-28 PROCEDURE — 82570 ASSAY OF URINE CREATININE: CPT

## 2021-01-28 PROCEDURE — 82043 UR ALBUMIN QUANTITATIVE: CPT

## 2021-01-28 PROCEDURE — 36415 COLL VENOUS BLD VENIPUNCTURE: CPT

## 2021-01-28 PROCEDURE — 85025 COMPLETE CBC W/AUTO DIFF WBC: CPT

## 2021-01-28 PROCEDURE — 80061 LIPID PANEL: CPT

## 2021-02-03 DIAGNOSIS — E11.65 UNCONTROLLED TYPE 2 DIABETES MELLITUS WITH HYPERGLYCEMIA (HCC): ICD-10-CM

## 2021-02-03 DIAGNOSIS — I10 ESSENTIAL HYPERTENSION WITH GOAL BLOOD PRESSURE LESS THAN 140/90: ICD-10-CM

## 2021-02-03 DIAGNOSIS — E11.40 TYPE 2 DIABETES MELLITUS WITH DIABETIC NEUROPATHY, WITHOUT LONG-TERM CURRENT USE OF INSULIN (HCC): ICD-10-CM

## 2021-02-03 DIAGNOSIS — E78.2 MIXED HYPERLIPIDEMIA: ICD-10-CM

## 2021-02-04 NOTE — TELEPHONE ENCOUNTER
LOV   1/19/2021    LAST LAB 1-28-20    LAST RX 11-2-20 30*2 for both meds    Next OV No future appointments.     PROTOCOL    Name from pharmacy: Losartan Potassium 100 MG Oral Tablet          Will file in chart as: LOSARTAN 100 MG Oral Tab    Sig: Take 1 ta

## 2021-02-05 RX ORDER — ATORVASTATIN CALCIUM 20 MG/1
20 TABLET, FILM COATED ORAL NIGHTLY
Qty: 30 TABLET | Refills: 2 | Status: SHIPPED | OUTPATIENT
Start: 2021-02-05 | End: 2021-05-17

## 2021-02-05 RX ORDER — LOSARTAN POTASSIUM 100 MG/1
100 TABLET ORAL DAILY
Qty: 30 TABLET | Refills: 2 | Status: SHIPPED | OUTPATIENT
Start: 2021-02-05 | End: 2021-05-11

## 2021-02-12 ENCOUNTER — OFFICE VISIT (OUTPATIENT)
Dept: FAMILY MEDICINE CLINIC | Facility: CLINIC | Age: 71
End: 2021-02-12
Payer: MEDICAID

## 2021-02-12 VITALS
HEIGHT: 63 IN | WEIGHT: 151.38 LBS | BODY MASS INDEX: 26.82 KG/M2 | RESPIRATION RATE: 16 BRPM | OXYGEN SATURATION: 99 % | TEMPERATURE: 98 F | DIASTOLIC BLOOD PRESSURE: 78 MMHG | SYSTOLIC BLOOD PRESSURE: 130 MMHG | HEART RATE: 69 BPM

## 2021-02-12 DIAGNOSIS — Z00.00 WELL WOMAN EXAM (NO GYNECOLOGICAL EXAM): Primary | ICD-10-CM

## 2021-02-12 DIAGNOSIS — M50.30 DDD (DEGENERATIVE DISC DISEASE), CERVICAL: ICD-10-CM

## 2021-02-12 DIAGNOSIS — Z23 NEED FOR VACCINATION: ICD-10-CM

## 2021-02-12 DIAGNOSIS — M62.838 CERVICAL PARASPINAL MUSCLE SPASM: ICD-10-CM

## 2021-02-12 DIAGNOSIS — Z12.31 VISIT FOR SCREENING MAMMOGRAM: ICD-10-CM

## 2021-02-12 DIAGNOSIS — Z78.0 POSTMENOPAUSAL: ICD-10-CM

## 2021-02-12 PROCEDURE — 3075F SYST BP GE 130 - 139MM HG: CPT | Performed by: FAMILY MEDICINE

## 2021-02-12 PROCEDURE — 3078F DIAST BP <80 MM HG: CPT | Performed by: FAMILY MEDICINE

## 2021-02-12 PROCEDURE — 99397 PER PM REEVAL EST PAT 65+ YR: CPT | Performed by: FAMILY MEDICINE

## 2021-02-12 PROCEDURE — 3008F BODY MASS INDEX DOCD: CPT | Performed by: FAMILY MEDICINE

## 2021-02-12 RX ORDER — TIZANIDINE 2 MG/1
2 TABLET ORAL NIGHTLY PRN
Qty: 30 TABLET | Refills: 1 | Status: SHIPPED | OUTPATIENT
Start: 2021-02-12 | End: 2021-08-16 | Stop reason: ALTCHOICE

## 2021-02-12 NOTE — PATIENT INSTRUCTIONS
Prevention Guidelines, Women Ages 72 and Older  Screening tests and vaccines are an important part of managing your health. A screening test is done to find possible disorders or diseases in people who don't have any symptoms.  The goal is to find a disea Multiple tests are available and are used at different times.  Possible tests include:  · Flexible sigmoidoscopy every 5 years, or  · Colonoscopy every 10 years, or  · CT colonography (virtual colonoscopy) every 5 years, or  · Yearly fecal occult blood test hormone (TSH) Only women in this age group with symptoms of thyroid dysfunction Talk with your healthcare provider   Tuberculosis Women at increased risk for infection Talk with your healthcare provider   Vision All women in this age group Every 1 to 2 yea the first. This is given even if you've had shingles before or had a previous zoster live vaccine. · Zoster live vaccine live (ZVL; Zostavax) may be given to healthy adults over age 61. It's given in one dose.    Counseling Who needs it How often   Diet an self-awareness is different than a BSE. It isn’t about following a certain method and schedule. It’s about knowing what's normal for your breasts. That way you can spot even small changes right away. If you see any changes, tell your healthcare provider.

## 2021-02-12 NOTE — PROGRESS NOTES
Patient presents with:  Physical      HPI:  72yr old female presents for her annual physical and to review recent labs. C/o neck and upper back pain with prolonged sitting or standing. Denies any radicular symptoms, paresthesias or weakness.     Review of status: Never Smoker      Smokeless tobacco: Never Used    Alcohol use: No    Drug use: No      Current Outpatient Medications   Medication Sig Dispense Refill   • tiZANidine HCl 2 MG Oral Tab Take 1 tablet (2 mg total) by mouth nightly as needed.  30 table regular rhythm and intact distal pulses. No murmur, rubs or gallops. Pulmonary/Chest: Effort normal and breath sounds normal. No respiratory distress. No wheezes, rhonchi or rales  Abdominal: Soft.  Bowel sounds are normal. Non tender, no masses, no orga 40)    Colon Cancer Screening: Starting at the age of 48. Yearly FOBT, sigmoidoscopy every 5 years, or colonoscopy every 10 years.        A/P:    Well woman exam (no gynecological exam)  (primary encounter diagnosis)  Visit for screening mammogram  Postmen

## 2021-02-18 RX ORDER — GLIMEPIRIDE 4 MG/1
4 TABLET ORAL
Qty: 30 TABLET | Refills: 2 | Status: SHIPPED | OUTPATIENT
Start: 2021-02-18 | End: 2021-05-19

## 2021-02-18 NOTE — TELEPHONE ENCOUNTER
Diabetic Medication Protocol Cghixv9102/17/2021 05:28 PM   Last HgBA1C < 7.5 Protocol Details    HgBA1C procedure resulted in past 6 months     Microalbumin procedure in past 12 months or taking ACE/ARB     Appointment in past 6 or next 3 months      LOV 1/19/21     LAST LAB  1/28/2021      LAST RX  1/18/2021 30 tabs 0 refills     Next OV No future appointments. PROTOCOL failed       Please approve or deny request . Thank you .

## 2021-02-19 ENCOUNTER — PATIENT MESSAGE (OUTPATIENT)
Dept: FAMILY MEDICINE CLINIC | Facility: CLINIC | Age: 71
End: 2021-02-19

## 2021-02-22 NOTE — TELEPHONE ENCOUNTER
From: Montgomery Snellen ToEulalio Keepers, DO  Sent: 2/19/2021 12:43 PM CST  Subject: Other    Hi. I wanted to know what is the status on Covid vaccines in your office.  also if I'm able to get an appointment through [facility] for the vaccine should I go ahead and

## 2021-03-03 RX ORDER — INSULIN GLARGINE 100 [IU]/ML
15 INJECTION, SOLUTION SUBCUTANEOUS EVERY MORNING
Qty: 2 PEN | Refills: 2 | OUTPATIENT
Start: 2021-03-03

## 2021-03-04 DIAGNOSIS — Z23 NEED FOR VACCINATION: ICD-10-CM

## 2021-03-10 DIAGNOSIS — E11.40 TYPE 2 DIABETES MELLITUS WITH DIABETIC NEUROPATHY, WITHOUT LONG-TERM CURRENT USE OF INSULIN (HCC): ICD-10-CM

## 2021-03-11 RX ORDER — BLOOD SUGAR DIAGNOSTIC
STRIP MISCELLANEOUS
Qty: 100 EACH | Refills: 0 | Status: SHIPPED | OUTPATIENT
Start: 2021-03-11 | End: 2021-08-02

## 2021-04-04 DIAGNOSIS — R60.0 BILATERAL LOWER EXTREMITY EDEMA: ICD-10-CM

## 2021-04-04 DIAGNOSIS — I10 ESSENTIAL HYPERTENSION: ICD-10-CM

## 2021-04-05 RX ORDER — FUROSEMIDE 40 MG/1
TABLET ORAL
Qty: 90 TABLET | Refills: 0 | Status: SHIPPED | OUTPATIENT
Start: 2021-04-05 | End: 2021-07-06

## 2021-04-10 DIAGNOSIS — E11.40 TYPE 2 DIABETES MELLITUS WITH DIABETIC NEUROPATHY, WITHOUT LONG-TERM CURRENT USE OF INSULIN (HCC): ICD-10-CM

## 2021-04-10 DIAGNOSIS — E11.65 UNCONTROLLED TYPE 2 DIABETES MELLITUS WITH HYPERGLYCEMIA (HCC): ICD-10-CM

## 2021-04-12 NOTE — TELEPHONE ENCOUNTER
Name from pharmacy: Lantus SoloStar 100 UNIT/ML Subcutaneous Solution Pen-injector          Will file in chart as: LANTUS SOLOSTAR 100 UNIT/ML Subcutaneous Solution Pen-injector    Sig: INJECT 15 UNITS SUBCUTANEOUSLY IN THE MORNING    Disp:  15 mL    Refil

## 2021-04-13 ENCOUNTER — PATIENT MESSAGE (OUTPATIENT)
Dept: FAMILY MEDICINE CLINIC | Facility: CLINIC | Age: 71
End: 2021-04-13

## 2021-04-13 RX ORDER — INSULIN GLARGINE 100 [IU]/ML
15 INJECTION, SOLUTION SUBCUTANEOUS EVERY MORNING
Qty: 15 ML | Refills: 0 | Status: CANCELLED | OUTPATIENT
Start: 2021-04-13

## 2021-04-13 RX ORDER — INSULIN GLARGINE 100 [IU]/ML
INJECTION, SOLUTION SUBCUTANEOUS
Qty: 15 ML | Refills: 0 | Status: SHIPPED | OUTPATIENT
Start: 2021-04-13 | End: 2021-04-14

## 2021-04-13 NOTE — TELEPHONE ENCOUNTER
Please see refill request 4/10 and advise if can be refilled please as pt is out of medication thank you

## 2021-04-13 NOTE — TELEPHONE ENCOUNTER
From: Torsten Calderon  To: Johnnie Dhillon DO  Sent: 4/13/2021 11:45 AM CDT  Subject: Prescription Question    Hi! my [facility] pharmacy has sent in a request for my insulin pen refill on Saturday and has not received any response from your office yet.  I'm out

## 2021-04-14 ENCOUNTER — PATIENT MESSAGE (OUTPATIENT)
Dept: FAMILY MEDICINE CLINIC | Facility: CLINIC | Age: 71
End: 2021-04-14

## 2021-04-14 RX ORDER — INSULIN GLARGINE 100 [IU]/ML
25 INJECTION, SOLUTION SUBCUTANEOUS EVERY MORNING
Qty: 7.5 ML | Refills: 2 | Status: SHIPPED | OUTPATIENT
Start: 2021-04-14 | End: 2021-06-11

## 2021-04-14 NOTE — TELEPHONE ENCOUNTER
From: Montgomery Snellen ToEulalio Keepers, DO  Sent: 4/14/2021 2:45 PM CDT  Subject: Prescription Question    Hi. This is again regarding my insulin prescription.  My dosage was changed from 15units to 25 units about a month ago but the pharmacy has the old or

## 2021-04-14 NOTE — TELEPHONE ENCOUNTER
Received VMM from patient's daughter stating at one of the recent visits patient was instructed to increase Lantus insulin pen to 25 units daily. Old order is for 15 units daily.   Pharmacy won't refill without updated order because it is too soon for [facility]

## 2021-05-10 DIAGNOSIS — I10 ESSENTIAL HYPERTENSION WITH GOAL BLOOD PRESSURE LESS THAN 140/90: ICD-10-CM

## 2021-05-11 RX ORDER — LOSARTAN POTASSIUM 100 MG/1
TABLET ORAL
Qty: 30 TABLET | Refills: 2 | Status: SHIPPED | OUTPATIENT
Start: 2021-05-11 | End: 2021-06-11

## 2021-05-17 ENCOUNTER — PATIENT MESSAGE (OUTPATIENT)
Dept: FAMILY MEDICINE CLINIC | Facility: CLINIC | Age: 71
End: 2021-05-17

## 2021-05-17 DIAGNOSIS — E78.2 MIXED HYPERLIPIDEMIA: ICD-10-CM

## 2021-05-17 DIAGNOSIS — E11.40 TYPE 2 DIABETES MELLITUS WITH DIABETIC NEUROPATHY, WITHOUT LONG-TERM CURRENT USE OF INSULIN (HCC): ICD-10-CM

## 2021-05-17 RX ORDER — ATORVASTATIN CALCIUM 20 MG/1
TABLET, FILM COATED ORAL
Qty: 30 TABLET | Refills: 2 | Status: SHIPPED | OUTPATIENT
Start: 2021-05-17 | End: 2021-06-12

## 2021-05-17 NOTE — TELEPHONE ENCOUNTER
Cholesterol Medication Protocol [id number]/17/2021 03:30 PM   ALT < 80 Protocol Details    ALT resulted within past year     Lipid panel within past 12 months     Appointment within past 12 or next 3 months      LOV   1/19/21     LAST LAB  1/28/2021     LAST

## 2021-05-19 RX ORDER — GLIMEPIRIDE 4 MG/1
TABLET ORAL
Qty: 30 TABLET | Refills: 0 | Status: SHIPPED | OUTPATIENT
Start: 2021-05-19 | End: 2021-06-12

## 2021-06-07 ENCOUNTER — PATIENT MESSAGE (OUTPATIENT)
Dept: FAMILY MEDICINE CLINIC | Facility: CLINIC | Age: 71
End: 2021-06-07

## 2021-06-09 DIAGNOSIS — E11.40 TYPE 2 DIABETES MELLITUS WITH DIABETIC NEUROPATHY, WITHOUT LONG-TERM CURRENT USE OF INSULIN (HCC): ICD-10-CM

## 2021-06-09 DIAGNOSIS — E11.65 UNCONTROLLED TYPE 2 DIABETES MELLITUS WITH HYPERGLYCEMIA (HCC): ICD-10-CM

## 2021-06-09 NOTE — TELEPHONE ENCOUNTER
insulin glargine (LANTUS SOLOSTAR) 100 UNIT/ML Subcutaneous Solution Pen-injector         Sig: Inject 25 Units into the skin every morning.     Disp:  7.5 mL    Refills:  2    Start: 6/9/2021 - 9/7/2021    Class: Normal    For: Type 2 diabetes mellitus wit

## 2021-06-10 DIAGNOSIS — E11.40 TYPE 2 DIABETES MELLITUS WITH DIABETIC NEUROPATHY, WITHOUT LONG-TERM CURRENT USE OF INSULIN (HCC): ICD-10-CM

## 2021-06-10 DIAGNOSIS — I10 ESSENTIAL HYPERTENSION WITH GOAL BLOOD PRESSURE LESS THAN 140/90: ICD-10-CM

## 2021-06-11 RX ORDER — LOSARTAN POTASSIUM 100 MG/1
TABLET ORAL
Qty: 30 TABLET | Refills: 2 | Status: SHIPPED | OUTPATIENT
Start: 2021-06-11 | End: 2021-09-10

## 2021-06-11 RX ORDER — GLIMEPIRIDE 4 MG/1
TABLET ORAL
Qty: 30 TABLET | Refills: 2 | OUTPATIENT
Start: 2021-06-11

## 2021-06-11 RX ORDER — INSULIN GLARGINE 100 [IU]/ML
25 INJECTION, SOLUTION SUBCUTANEOUS EVERY MORNING
Qty: 7.5 ML | Refills: 0 | Status: SHIPPED | OUTPATIENT
Start: 2021-06-11 | End: 2021-06-12

## 2021-06-11 NOTE — TELEPHONE ENCOUNTER
Hypertension Medications Protocol Xjygmt66/10/2021 11:16 AM   CMP or BMP in past 12 months Protocol Details    Last serum creatinine< 2.0     Appointment in past 6 or next 3 months      LOV  2/12/21     LAST LAB   1/28/21     LAST RX  5/11/21 30 with 2 ref

## 2021-06-12 ENCOUNTER — OFFICE VISIT (OUTPATIENT)
Dept: FAMILY MEDICINE CLINIC | Facility: CLINIC | Age: 71
End: 2021-06-12
Payer: MEDICAID

## 2021-06-12 VITALS
SYSTOLIC BLOOD PRESSURE: 128 MMHG | HEART RATE: 69 BPM | WEIGHT: 153.5 LBS | OXYGEN SATURATION: 100 % | RESPIRATION RATE: 16 BRPM | BODY MASS INDEX: 27.54 KG/M2 | DIASTOLIC BLOOD PRESSURE: 80 MMHG | HEIGHT: 62.6 IN | TEMPERATURE: 98 F

## 2021-06-12 DIAGNOSIS — N18.32 STAGE 3B CHRONIC KIDNEY DISEASE (HCC): ICD-10-CM

## 2021-06-12 DIAGNOSIS — E11.40 TYPE 2 DIABETES MELLITUS WITH DIABETIC NEUROPATHY, WITHOUT LONG-TERM CURRENT USE OF INSULIN (HCC): Primary | ICD-10-CM

## 2021-06-12 DIAGNOSIS — E11.65 UNCONTROLLED TYPE 2 DIABETES MELLITUS WITH HYPERGLYCEMIA (HCC): ICD-10-CM

## 2021-06-12 DIAGNOSIS — I10 ESSENTIAL HYPERTENSION: ICD-10-CM

## 2021-06-12 DIAGNOSIS — E78.2 MIXED HYPERLIPIDEMIA: ICD-10-CM

## 2021-06-12 DIAGNOSIS — R60.0 BILATERAL LOWER EXTREMITY EDEMA: ICD-10-CM

## 2021-06-12 PROCEDURE — 82947 ASSAY GLUCOSE BLOOD QUANT: CPT | Performed by: FAMILY MEDICINE

## 2021-06-12 PROCEDURE — 3008F BODY MASS INDEX DOCD: CPT | Performed by: FAMILY MEDICINE

## 2021-06-12 PROCEDURE — 3074F SYST BP LT 130 MM HG: CPT | Performed by: FAMILY MEDICINE

## 2021-06-12 PROCEDURE — 99214 OFFICE O/P EST MOD 30 MIN: CPT | Performed by: FAMILY MEDICINE

## 2021-06-12 PROCEDURE — 3079F DIAST BP 80-89 MM HG: CPT | Performed by: FAMILY MEDICINE

## 2021-06-12 RX ORDER — INSULIN GLARGINE 100 [IU]/ML
25 INJECTION, SOLUTION SUBCUTANEOUS EVERY MORNING
Qty: 7.5 ML | Refills: 1 | Status: SHIPPED | OUTPATIENT
Start: 2021-06-12 | End: 2021-08-03

## 2021-06-12 RX ORDER — GLIMEPIRIDE 4 MG/1
4 TABLET ORAL
Qty: 30 TABLET | Refills: 2 | Status: SHIPPED | OUTPATIENT
Start: 2021-06-12 | End: 2021-11-18

## 2021-06-12 RX ORDER — ATORVASTATIN CALCIUM 20 MG/1
20 TABLET, FILM COATED ORAL NIGHTLY
Qty: 30 TABLET | Refills: 2 | Status: SHIPPED | OUTPATIENT
Start: 2021-06-12 | End: 2021-11-15

## 2021-06-12 NOTE — PATIENT INSTRUCTIONS
Diabetes and High Blood Pressure  High blood pressure is 2 times more likely to strike a person with diabetes. Left untreated, high blood pressure can cause serious health problems.  These include:   · Heart disease  · Kidney disease  · Eye problems  · St blood pressure is very high, you may have these symptoms:   · Headache  · Dizziness  · Blurred vision  The symptoms of high blood pressure may look like other health conditions or problems. Always talk with your healthcare provider for a diagnosis.    Preve

## 2021-06-12 NOTE — PROGRESS NOTES
HPI:   Dione Espino is a 66year old female who presents for recheck of her chronic conditions. DM2, has been taking medication as directed. Taking Lantus 25u qam and glimepiride 4mg po qam.  Patient’s FBS have been wnl.  Last visit with ophthalmologist w mg total) by mouth daily.  90 tablet 5       Wt Readings from Last 6 Encounters:  06/12/21 : 153 lb 8 oz (69.6 kg)  02/12/21 : 151 lb 6 oz (68.7 kg)  01/19/21 : 151 lb 8 oz (68.7 kg)  09/01/20 : 146 lb 9.6 oz (66.5 kg)  08/21/20 : 145 lb (65.8 kg)  08/10/20 Smoking status: Never Smoker      Smokeless tobacco: Never Used    Vaping Use      Vaping Use: Never used    Substance and Sexual Activity      Alcohol use: No      Drug use: No      Sexual activity: Not on file    Other Topics      Concerns:        Not on clubbing, 2+ BLE pedal/ankle edema    ASSESSMENT AND PLAN:   Ronni Zacarias is a 66year old female who presents for:  1. Type 2 diabetes mellitus with diabetic neuropathy, without long-term current use of insulin ([facility] [address]  2.  Uncontrolled type 2 diabetes jerrod

## 2021-06-14 ENCOUNTER — PATIENT MESSAGE (OUTPATIENT)
Dept: FAMILY MEDICINE CLINIC | Facility: CLINIC | Age: 71
End: 2021-06-14

## 2021-06-15 NOTE — TELEPHONE ENCOUNTER
From: Jenna Mustafa Pac, DO  Sent: 6/14/2021 9:55 PM CDT  Subject: Visit Follow-up Question    Hi! Here is the record of my covid 19 vaccination.   Moderna lot# [id number]  -First dose: 03/03/2021  location: [facility]  Second dose: 04/02/

## 2021-07-06 DIAGNOSIS — I10 ESSENTIAL HYPERTENSION: ICD-10-CM

## 2021-07-06 DIAGNOSIS — R60.0 BILATERAL LOWER EXTREMITY EDEMA: ICD-10-CM

## 2021-07-06 RX ORDER — FUROSEMIDE 40 MG/1
40 TABLET ORAL DAILY
Qty: 60 TABLET | Refills: 0 | Status: SHIPPED | OUTPATIENT
Start: 2021-07-06 | End: 2021-09-10

## 2021-07-13 ENCOUNTER — TELEPHONE (OUTPATIENT)
Dept: FAMILY MEDICINE CLINIC | Facility: CLINIC | Age: 71
End: 2021-07-13

## 2021-07-24 DIAGNOSIS — I10 ESSENTIAL HYPERTENSION WITH GOAL BLOOD PRESSURE LESS THAN 140/90: ICD-10-CM

## 2021-07-24 DIAGNOSIS — E11.40 TYPE 2 DIABETES MELLITUS WITH DIABETIC NEUROPATHY, WITHOUT LONG-TERM CURRENT USE OF INSULIN (HCC): ICD-10-CM

## 2021-07-24 DIAGNOSIS — R60.0 BILATERAL LOWER EXTREMITY EDEMA: ICD-10-CM

## 2021-07-25 ENCOUNTER — PATIENT MESSAGE (OUTPATIENT)
Dept: FAMILY MEDICINE CLINIC | Facility: CLINIC | Age: 71
End: 2021-07-25

## 2021-07-26 ENCOUNTER — TELEPHONE (OUTPATIENT)
Dept: NEPHROLOGY | Facility: CLINIC | Age: 71
End: 2021-07-26

## 2021-07-26 RX ORDER — CARVEDILOL 6.25 MG/1
TABLET ORAL
Qty: 180 TABLET | Refills: 0 | Status: SHIPPED | OUTPATIENT
Start: 2021-07-26 | End: 2021-10-27

## 2021-07-26 RX ORDER — LANCETS 33 GAUGE
EACH MISCELLANEOUS
Qty: 100 EACH | Refills: 0 | Status: SHIPPED | OUTPATIENT
Start: 2021-07-26 | End: 2021-08-05

## 2021-07-26 NOTE — TELEPHONE ENCOUNTER
Diabetic Supplies Protocol Nxxtks2007/26/2021 09:16 AM   Appointment in the past 12 or next 3 months

## 2021-07-26 NOTE — TELEPHONE ENCOUNTER
From: Kamryn Paiz DO  Sent: 7/25/2021 10:58 AM CDT  Subject: Prescription Question    hi. can you please renew the [address] request for my carvedilol prescription. I've been out for 2 days now and my BP seems to be going up everyday.  thanks

## 2021-07-27 ENCOUNTER — LAB ENCOUNTER (OUTPATIENT)
Dept: LAB | Age: 71
End: 2021-07-27
Attending: FAMILY MEDICINE
Payer: MEDICAID

## 2021-07-27 DIAGNOSIS — E11.65 UNCONTROLLED TYPE 2 DIABETES MELLITUS WITH HYPERGLYCEMIA (HCC): ICD-10-CM

## 2021-07-27 DIAGNOSIS — E11.40 TYPE 2 DIABETES MELLITUS WITH DIABETIC NEUROPATHY, WITHOUT LONG-TERM CURRENT USE OF INSULIN (HCC): ICD-10-CM

## 2021-07-27 DIAGNOSIS — N18.32 STAGE 3B CHRONIC KIDNEY DISEASE (HCC): ICD-10-CM

## 2021-07-27 DIAGNOSIS — I10 ESSENTIAL HYPERTENSION: ICD-10-CM

## 2021-07-27 DIAGNOSIS — E78.2 MIXED HYPERLIPIDEMIA: ICD-10-CM

## 2021-07-27 LAB
ALBUMIN SERPL-MCNC: 3.5 G/DL (ref 3.4–5)
ALBUMIN/GLOB SERPL: 0.8 {RATIO} (ref 1–2)
ALP LIVER SERPL-CCNC: 92 U/L
ALT SERPL-CCNC: 23 U/L
ANION GAP SERPL CALC-SCNC: 3 MMOL/L (ref 0–18)
AST SERPL-CCNC: 13 U/L (ref 15–37)
BILIRUB SERPL-MCNC: 0.5 MG/DL (ref 0.1–2)
BUN BLD-MCNC: 26 MG/DL (ref 7–18)
BUN/CREAT SERPL: 17.8 (ref 10–20)
CALCIUM BLD-MCNC: 8.9 MG/DL (ref 8.5–10.1)
CHLORIDE SERPL-SCNC: 109 MMOL/L (ref 98–112)
CHOLEST SMN-MCNC: 131 MG/DL (ref ?–200)
CO2 SERPL-SCNC: 30 MMOL/L (ref 21–32)
CREAT BLD-MCNC: 1.46 MG/DL
EST. AVERAGE GLUCOSE BLD GHB EST-MCNC: 189 MG/DL (ref 68–126)
GLOBULIN PLAS-MCNC: 4.4 G/DL (ref 2.8–4.4)
GLUCOSE BLD-MCNC: 95 MG/DL (ref 70–99)
HBA1C MFR BLD HPLC: 8.2 % (ref ?–5.7)
HDLC SERPL-MCNC: 43 MG/DL (ref 40–59)
LDLC SERPL CALC-MCNC: 68 MG/DL (ref ?–100)
M PROTEIN MFR SERPL ELPH: 7.9 G/DL (ref 6.4–8.2)
NONHDLC SERPL-MCNC: 88 MG/DL (ref ?–130)
OSMOLALITY SERPL CALC.SUM OF ELEC: 299 MOSM/KG (ref 275–295)
PATIENT FASTING Y/N/NP: YES
PATIENT FASTING Y/N/NP: YES
POTASSIUM SERPL-SCNC: 4.5 MMOL/L (ref 3.5–5.1)
SODIUM SERPL-SCNC: 142 MMOL/L (ref 136–145)
TRIGL SERPL-MCNC: 108 MG/DL (ref 30–149)
VLDLC SERPL CALC-MCNC: 16 MG/DL (ref 0–30)

## 2021-07-27 PROCEDURE — 36415 COLL VENOUS BLD VENIPUNCTURE: CPT

## 2021-07-27 PROCEDURE — 80061 LIPID PANEL: CPT

## 2021-07-27 PROCEDURE — 80053 COMPREHEN METABOLIC PANEL: CPT

## 2021-07-27 PROCEDURE — 3052F HG A1C>EQUAL 8.0%<EQUAL 9.0%: CPT | Performed by: INTERNAL MEDICINE

## 2021-07-27 PROCEDURE — 83036 HEMOGLOBIN GLYCOSYLATED A1C: CPT

## 2021-07-30 DIAGNOSIS — E11.40 TYPE 2 DIABETES MELLITUS WITH DIABETIC NEUROPATHY, WITHOUT LONG-TERM CURRENT USE OF INSULIN (HCC): ICD-10-CM

## 2021-07-30 DIAGNOSIS — E11.65 UNCONTROLLED TYPE 2 DIABETES MELLITUS WITH HYPERGLYCEMIA (HCC): ICD-10-CM

## 2021-08-02 ENCOUNTER — PATIENT MESSAGE (OUTPATIENT)
Dept: FAMILY MEDICINE CLINIC | Facility: CLINIC | Age: 71
End: 2021-08-02

## 2021-08-02 DIAGNOSIS — E11.40 TYPE 2 DIABETES MELLITUS WITH DIABETIC NEUROPATHY, WITHOUT LONG-TERM CURRENT USE OF INSULIN (HCC): ICD-10-CM

## 2021-08-02 RX ORDER — BLOOD SUGAR DIAGNOSTIC
STRIP MISCELLANEOUS
Qty: 100 EACH | Refills: 2 | Status: SHIPPED | OUTPATIENT
Start: 2021-08-02

## 2021-08-02 NOTE — TELEPHONE ENCOUNTER
Diabetic Supplies Protocol Passed07/30/2021 02:20 PM   Appointment in the past 12 or next 3 months      Name from pharmacy: Lantus SoloStar 100 UNIT/ML Subcutaneous Solution Pen-injector         Will file in chart as: LANTUS SOLOSTAR 100 UNIT/ML Subcutaneo

## 2021-08-03 RX ORDER — INSULIN GLARGINE 100 [IU]/ML
INJECTION, SOLUTION SUBCUTANEOUS
Qty: 15 ML | Refills: 0 | Status: SHIPPED | OUTPATIENT
Start: 2021-08-03 | End: 2021-11-24

## 2021-08-05 RX ORDER — LANCETS 33 GAUGE
1 EACH MISCELLANEOUS DAILY
Qty: 100 EACH | Refills: 2 | Status: SHIPPED | OUTPATIENT
Start: 2021-08-05 | End: 2021-12-16

## 2021-08-05 NOTE — TELEPHONE ENCOUNTER
From: Kristian Russo DO  Sent: 8/2/2021 4:34 PM CDT  Subject: Prescription Question    [facility] [place] needs the prescription refill on my glucometer strips please.  thank you

## 2021-08-13 ENCOUNTER — TELEPHONE (OUTPATIENT)
Dept: FAMILY MEDICINE CLINIC | Facility: CLINIC | Age: 71
End: 2021-08-13

## 2021-08-16 ENCOUNTER — OFFICE VISIT (OUTPATIENT)
Dept: NEPHROLOGY | Facility: CLINIC | Age: 71
End: 2021-08-16
Payer: MEDICAID

## 2021-08-16 VITALS — SYSTOLIC BLOOD PRESSURE: 138 MMHG | WEIGHT: 155 LBS | DIASTOLIC BLOOD PRESSURE: 72 MMHG | BODY MASS INDEX: 28 KG/M2

## 2021-08-16 DIAGNOSIS — I10 PRIMARY HYPERTENSION: ICD-10-CM

## 2021-08-16 DIAGNOSIS — R60.9 EDEMA, UNSPECIFIED TYPE: ICD-10-CM

## 2021-08-16 DIAGNOSIS — N18.30 STAGE 3 CHRONIC KIDNEY DISEASE, UNSPECIFIED WHETHER STAGE 3A OR 3B CKD (HCC): Primary | ICD-10-CM

## 2021-08-16 PROCEDURE — 3075F SYST BP GE 130 - 139MM HG: CPT | Performed by: INTERNAL MEDICINE

## 2021-08-16 PROCEDURE — 3078F DIAST BP <80 MM HG: CPT | Performed by: INTERNAL MEDICINE

## 2021-08-16 PROCEDURE — 99214 OFFICE O/P EST MOD 30 MIN: CPT | Performed by: INTERNAL MEDICINE

## 2021-08-16 RX ORDER — HYDRALAZINE HYDROCHLORIDE 25 MG/1
25 TABLET, FILM COATED ORAL 3 TIMES DAILY
Qty: 60 TABLET | Refills: 11 | Status: SHIPPED | OUTPATIENT
Start: 2021-08-16

## 2021-08-16 NOTE — PROGRESS NOTES
Nephrology Progress Note      ASSESSMENT/PLAN:      1) CKD 3- renal function has been relatively stable over the past several yrs with a serum creatinine of 1.5-2.9 mg/dL; this is presumably due to diabetic and hypertensive nephropathy as evaluation for ot rashes/myalgias/arthralgias    PMH:  Past Medical History:   Diagnosis Date   • Cataract    • Type II or unspecified type diabetes mellitus without mention of complication, not stated as uncontrolled    • Unspecified essential hypertension        PSH:  Pas file      Highest education level: Not on file    Tobacco Use      Smoking status: Never Smoker      Smokeless tobacco: Never Used    Vaping Use      Vaping Use: Never used    Substance and Sexual Activity      Alcohol use: No      Drug use: No       Famil

## 2021-09-10 DIAGNOSIS — R60.0 BILATERAL LOWER EXTREMITY EDEMA: ICD-10-CM

## 2021-09-10 DIAGNOSIS — I10 ESSENTIAL HYPERTENSION: ICD-10-CM

## 2021-09-10 DIAGNOSIS — I10 ESSENTIAL HYPERTENSION WITH GOAL BLOOD PRESSURE LESS THAN 140/90: ICD-10-CM

## 2021-09-10 RX ORDER — FUROSEMIDE 40 MG/1
TABLET ORAL
Qty: 60 TABLET | Refills: 2 | Status: SHIPPED | OUTPATIENT
Start: 2021-09-10 | End: 2022-03-14

## 2021-09-10 RX ORDER — LOSARTAN POTASSIUM 100 MG/1
TABLET ORAL
Qty: 30 TABLET | Refills: 2 | Status: SHIPPED | OUTPATIENT
Start: 2021-09-10 | End: 2021-11-13

## 2021-09-10 NOTE — TELEPHONE ENCOUNTER
Hypertension Medications Protocol Uuhldf76/10/2021 05:30 AM   CMP or BMP in past 12 months Protocol Details    Last serum creatinine< 2.0     Appointment in past 6 or next 3 months      LOV 6/12/21     LAST LAB   7/27/21     LAST RX  6/11/21 30 with 2

## 2021-10-01 ENCOUNTER — TELEPHONE (OUTPATIENT)
Dept: FAMILY MEDICINE CLINIC | Facility: CLINIC | Age: 71
End: 2021-10-01

## 2021-10-27 DIAGNOSIS — R60.0 BILATERAL LOWER EXTREMITY EDEMA: ICD-10-CM

## 2021-10-27 DIAGNOSIS — I10 ESSENTIAL HYPERTENSION WITH GOAL BLOOD PRESSURE LESS THAN 140/90: ICD-10-CM

## 2021-10-27 RX ORDER — CARVEDILOL 6.25 MG/1
6.25 TABLET ORAL 2 TIMES DAILY WITH MEALS
Qty: 180 TABLET | Refills: 1 | Status: SHIPPED | OUTPATIENT
Start: 2021-10-27

## 2021-11-03 DIAGNOSIS — I10 ESSENTIAL HYPERTENSION WITH GOAL BLOOD PRESSURE LESS THAN 140/90: ICD-10-CM

## 2021-11-04 RX ORDER — LOSARTAN POTASSIUM 100 MG/1
TABLET ORAL
Qty: 90 TABLET | Refills: 0 | OUTPATIENT
Start: 2021-11-04

## 2021-11-04 NOTE — TELEPHONE ENCOUNTER
LOV 8/13/2021    LAST LAB    LAST RX     Next OV No future appointments.     PROTOCOL  Name from pharmacy: Losartan Potassium 100 MG Oral Tablet          Will file in chart as: LOSARTAN 100 MG Oral Tab    Sig: Take 1 tablet by mouth once daily    Disp:  90

## 2021-11-13 DIAGNOSIS — I10 ESSENTIAL HYPERTENSION WITH GOAL BLOOD PRESSURE LESS THAN 140/90: ICD-10-CM

## 2021-11-13 RX ORDER — LOSARTAN POTASSIUM 100 MG/1
TABLET ORAL
Qty: 90 TABLET | Refills: 0 | Status: SHIPPED | OUTPATIENT
Start: 2021-11-13 | End: 2021-12-10

## 2021-11-13 NOTE — TELEPHONE ENCOUNTER
Hypertension Medications Protocol Passed 11/13/2021 12:43 PM   Protocol Details  CMP or BMP in past 12 months    Last serum creatinine< 2.0    Appointment in past 6 or next 3 months     LOV  6/12/21     LAST LAB     LAST RX  9/10/21 30 with 2     Next OV N

## 2021-11-14 DIAGNOSIS — E11.65 UNCONTROLLED TYPE 2 DIABETES MELLITUS WITH HYPERGLYCEMIA (HCC): ICD-10-CM

## 2021-11-14 DIAGNOSIS — E11.40 TYPE 2 DIABETES MELLITUS WITH DIABETIC NEUROPATHY, WITHOUT LONG-TERM CURRENT USE OF INSULIN (HCC): ICD-10-CM

## 2021-11-14 DIAGNOSIS — E78.2 MIXED HYPERLIPIDEMIA: ICD-10-CM

## 2021-11-15 RX ORDER — ATORVASTATIN CALCIUM 20 MG/1
TABLET, FILM COATED ORAL
Qty: 30 TABLET | Refills: 0 | Status: SHIPPED | OUTPATIENT
Start: 2021-11-15 | End: 2021-12-10

## 2021-11-15 NOTE — TELEPHONE ENCOUNTER
Cholesterol Medication Protocol Passed 11/14/2021 03:29 PM   Protocol Details  ALT < 80    ALT resulted within past year    Lipid panel within past 12 months    Appointment within past 12 or next 3 months     LOV  6/12/21     LAST LAB n/a    LAST RX 6/12/2

## 2021-11-18 RX ORDER — GLIMEPIRIDE 4 MG/1
TABLET ORAL
Qty: 30 TABLET | Refills: 0 | Status: SHIPPED | OUTPATIENT
Start: 2021-11-18 | End: 2021-12-10

## 2021-11-19 DIAGNOSIS — E11.40 TYPE 2 DIABETES MELLITUS WITH DIABETIC NEUROPATHY, WITHOUT LONG-TERM CURRENT USE OF INSULIN (HCC): ICD-10-CM

## 2021-11-19 DIAGNOSIS — E11.65 UNCONTROLLED TYPE 2 DIABETES MELLITUS WITH HYPERGLYCEMIA (HCC): ICD-10-CM

## 2021-11-22 DIAGNOSIS — E11.40 TYPE 2 DIABETES MELLITUS WITH DIABETIC NEUROPATHY, WITHOUT LONG-TERM CURRENT USE OF INSULIN (HCC): ICD-10-CM

## 2021-11-22 DIAGNOSIS — E11.65 UNCONTROLLED TYPE 2 DIABETES MELLITUS WITH HYPERGLYCEMIA (HCC): ICD-10-CM

## 2021-11-22 NOTE — TELEPHONE ENCOUNTER
Patient's daughter called to make a med f/u. 40 min slot not available. Daughter said she is in need of a refill on Lantus. Please advise where to schedule and please refill Lantus.

## 2021-11-23 NOTE — TELEPHONE ENCOUNTER
LOV 6/12/21    Last refill  LANTUS SOLOSTAR 100 UNIT/ML Subcutaneous Solution Pen-injector 15 mL 0 8/3/2021    Sig:   INJECT 15 UNITS SUBCUTANEOUSLY IN THE MORNING       Last labs 7/27/21    No future appointments. rx pended for your approval if ok.   Pl

## 2021-11-24 ENCOUNTER — PATIENT MESSAGE (OUTPATIENT)
Dept: FAMILY MEDICINE CLINIC | Facility: CLINIC | Age: 71
End: 2021-11-24

## 2021-11-24 DIAGNOSIS — E11.40 TYPE 2 DIABETES MELLITUS WITH DIABETIC NEUROPATHY, WITHOUT LONG-TERM CURRENT USE OF INSULIN (HCC): ICD-10-CM

## 2021-11-24 DIAGNOSIS — E11.65 UNCONTROLLED TYPE 2 DIABETES MELLITUS WITH HYPERGLYCEMIA (HCC): ICD-10-CM

## 2021-11-24 RX ORDER — INSULIN GLARGINE 100 [IU]/ML
INJECTION, SOLUTION SUBCUTANEOUS
Qty: 15 ML | Refills: 0 | OUTPATIENT
Start: 2021-11-24

## 2021-11-24 RX ORDER — INSULIN GLARGINE 100 [IU]/ML
INJECTION, SOLUTION SUBCUTANEOUS
Qty: 15 ML | Refills: 0 | Status: SHIPPED | OUTPATIENT
Start: 2021-11-24 | End: 2021-11-29

## 2021-11-24 RX ORDER — INSULIN GLARGINE 100 [IU]/ML
15 INJECTION, SOLUTION SUBCUTANEOUS EVERY MORNING
Qty: 15 ML | Refills: 0 | Status: SHIPPED | OUTPATIENT
Start: 2021-11-24 | End: 2021-12-10 | Stop reason: DRUGHIGH

## 2021-11-26 ENCOUNTER — PATIENT MESSAGE (OUTPATIENT)
Dept: FAMILY MEDICINE CLINIC | Facility: CLINIC | Age: 71
End: 2021-11-26

## 2021-11-26 DIAGNOSIS — E11.40 TYPE 2 DIABETES MELLITUS WITH DIABETIC NEUROPATHY, WITHOUT LONG-TERM CURRENT USE OF INSULIN (HCC): ICD-10-CM

## 2021-11-26 DIAGNOSIS — E11.65 UNCONTROLLED TYPE 2 DIABETES MELLITUS WITH HYPERGLYCEMIA (HCC): ICD-10-CM

## 2021-11-26 NOTE — TELEPHONE ENCOUNTER
From: Kaley Fairbanks DO  Sent: 11/24/2021 4:31 PM CST  Subject: urgent insulin refill. I've been waiting for the doctors office to send the refill renewal for my insulin pen . both the office and the pharmacy is pushing me to get an approval or e-request from the other. im completely out of the refills and the pharmacy will now be close for Thanksgiving and ill miss my dose. I have this problem with your office every time.  I donr know where the hold up is and why can't a nurse (who I talked to this morning) simply approve it

## 2021-11-29 RX ORDER — INSULIN GLARGINE 100 [IU]/ML
25 INJECTION, SOLUTION SUBCUTANEOUS EVERY MORNING
Qty: 21 ML | Refills: 1 | Status: SHIPPED | OUTPATIENT
Start: 2021-11-29 | End: 2021-12-10

## 2021-11-29 NOTE — TELEPHONE ENCOUNTER
LOV 06-12-21. Next appt 12-10-21. Patient taking 25 units of Lantus daily, refill sent on 11-24-21 with instructions to use 15 units daily.      Pended new order for your approval.

## 2021-12-10 ENCOUNTER — OFFICE VISIT (OUTPATIENT)
Dept: FAMILY MEDICINE CLINIC | Facility: CLINIC | Age: 71
End: 2021-12-10
Payer: MEDICAID

## 2021-12-10 VITALS
SYSTOLIC BLOOD PRESSURE: 144 MMHG | BODY MASS INDEX: 27.63 KG/M2 | HEART RATE: 74 BPM | OXYGEN SATURATION: 98 % | TEMPERATURE: 98 F | HEIGHT: 62.6 IN | RESPIRATION RATE: 16 BRPM | DIASTOLIC BLOOD PRESSURE: 94 MMHG | WEIGHT: 154 LBS

## 2021-12-10 DIAGNOSIS — I10 ESSENTIAL HYPERTENSION WITH GOAL BLOOD PRESSURE LESS THAN 140/90: ICD-10-CM

## 2021-12-10 DIAGNOSIS — E11.40 TYPE 2 DIABETES MELLITUS WITH DIABETIC NEUROPATHY, WITHOUT LONG-TERM CURRENT USE OF INSULIN (HCC): Primary | ICD-10-CM

## 2021-12-10 DIAGNOSIS — N18.32 STAGE 3B CHRONIC KIDNEY DISEASE (HCC): ICD-10-CM

## 2021-12-10 DIAGNOSIS — E78.2 MIXED HYPERLIPIDEMIA: ICD-10-CM

## 2021-12-10 DIAGNOSIS — E11.65 UNCONTROLLED TYPE 2 DIABETES MELLITUS WITH HYPERGLYCEMIA (HCC): ICD-10-CM

## 2021-12-10 DIAGNOSIS — Z23 NEED FOR VACCINATION: ICD-10-CM

## 2021-12-10 PROCEDURE — 90472 IMMUNIZATION ADMIN EACH ADD: CPT | Performed by: FAMILY MEDICINE

## 2021-12-10 PROCEDURE — 90750 HZV VACC RECOMBINANT IM: CPT | Performed by: FAMILY MEDICINE

## 2021-12-10 PROCEDURE — 90471 IMMUNIZATION ADMIN: CPT | Performed by: FAMILY MEDICINE

## 2021-12-10 PROCEDURE — 99214 OFFICE O/P EST MOD 30 MIN: CPT | Performed by: FAMILY MEDICINE

## 2021-12-10 PROCEDURE — 3008F BODY MASS INDEX DOCD: CPT | Performed by: FAMILY MEDICINE

## 2021-12-10 PROCEDURE — 3077F SYST BP >= 140 MM HG: CPT | Performed by: FAMILY MEDICINE

## 2021-12-10 PROCEDURE — 3080F DIAST BP >= 90 MM HG: CPT | Performed by: FAMILY MEDICINE

## 2021-12-10 PROCEDURE — 83036 HEMOGLOBIN GLYCOSYLATED A1C: CPT | Performed by: FAMILY MEDICINE

## 2021-12-10 PROCEDURE — 90662 IIV NO PRSV INCREASED AG IM: CPT | Performed by: FAMILY MEDICINE

## 2021-12-10 RX ORDER — INSULIN GLARGINE 100 [IU]/ML
30 INJECTION, SOLUTION SUBCUTANEOUS EVERY MORNING
Qty: 9 ML | Refills: 1 | Status: SHIPPED | OUTPATIENT
Start: 2021-12-10

## 2021-12-10 RX ORDER — ATORVASTATIN CALCIUM 20 MG/1
20 TABLET, FILM COATED ORAL NIGHTLY
Qty: 90 TABLET | Refills: 1 | Status: SHIPPED | OUTPATIENT
Start: 2021-12-10

## 2021-12-10 RX ORDER — LOSARTAN POTASSIUM 100 MG/1
100 TABLET ORAL DAILY
Qty: 90 TABLET | Refills: 1 | Status: SHIPPED | OUTPATIENT
Start: 2021-12-10

## 2021-12-10 RX ORDER — GLIMEPIRIDE 4 MG/1
4 TABLET ORAL
Qty: 90 TABLET | Refills: 1 | Status: SHIPPED | OUTPATIENT
Start: 2021-12-10

## 2021-12-10 NOTE — PROGRESS NOTES
HPI:   Kristian Schwartz is a 66year old female who presents for recheck of her chronic conditions. DM2, taking medication as directed. Taking Lantus 25u qam and glimepiride 4mg po qam. Patient’s FBS have been 90-110s.  Last visit with ophthalmologist was >1y lb 8 oz (68.7 kg)  09/01/20 : 146 lb 9.6 oz (66.5 kg)     . Body mass index is 27.63 kg/m².       Lab Results   Component Value Date    A1C 8.2 (A) 12/10/2021    A1C 8.2 (H) 07/27/2021    A1C 8.8 (H) 01/28/2021     (H) 07/27/2021     (H) 01/2 Topics      Concerns:        Not on file    Social History Narrative      Not on file    Social Determinants of Health  Financial Resource Strain: Not on file  Food Insecurity: Not on file  Transportation Needs: Not on file  Physical Activity: Not on file pressure less than 140/90  5. Stage 3b chronic kidney disease ([facility] [address]  - bp elevated today, pt has not taken medication as of yet  - cpm  - following with nephrology, Dr. Nicholas Ni  - monitor bp at home  - losartan 100 MG Oral Tab;  Take 1 tablet (100 mg total) by

## 2021-12-10 NOTE — PATIENT INSTRUCTIONS
Controlling Your Cholesterol  Cholesterol is a waxy substance. It travels in your blood through the blood vessels. When you have high cholesterol, it can build up along the walls of the blood vessels.  This makes the vessels narrower and decreases blood f levels, another form of fat in the blood. If you are pregnant or thinking of becoming pregnant or are breastfeeding, talk with your healthcare provider for advice about the best fish choices and how much to eat.   · Eat more whole grains and soluble fiber ( doesn’t mean you have to eat special foods or give up desserts. Instead, your dietitian can show you how to plan meals to suit your body. To start, learn how different foods affect blood sugar.   Carbohydrates  Carbohydrates (carbs) are the main source of f Polyunsaturated fats lower LDL (unhealthy) cholesterol. So, choosing them instead of saturated fats is healthy for your heart. Certain unsaturated fats can help lower triglycerides.   Less healthy:  · Saturated fats. These are found in animal products, such

## 2021-12-15 DIAGNOSIS — E11.65 UNCONTROLLED TYPE 2 DIABETES MELLITUS WITH HYPERGLYCEMIA (HCC): ICD-10-CM

## 2021-12-15 DIAGNOSIS — E11.40 TYPE 2 DIABETES MELLITUS WITH DIABETIC NEUROPATHY, WITHOUT LONG-TERM CURRENT USE OF INSULIN (HCC): ICD-10-CM

## 2021-12-16 RX ORDER — GLIMEPIRIDE 4 MG/1
TABLET ORAL
Qty: 30 TABLET | Refills: 0 | OUTPATIENT
Start: 2021-12-16

## 2021-12-16 RX ORDER — PEN NEEDLE, DIABETIC 31 GX5/16"
NEEDLE, DISPOSABLE MISCELLANEOUS
Qty: 100 EACH | Refills: 0 | Status: SHIPPED | OUTPATIENT
Start: 2021-12-16 | End: 2021-12-28

## 2021-12-16 RX ORDER — LANCETS 33 GAUGE
EACH MISCELLANEOUS
Qty: 100 EACH | Refills: 0 | Status: SHIPPED | OUTPATIENT
Start: 2021-12-16

## 2021-12-16 NOTE — TELEPHONE ENCOUNTER
Diabetic Supplies Protocol Passed 12/15/2021 05:31 AM    Appointment in the past 12 or next 3 months     LOV 12/10/21     LAST LAB  12/10/21    LAST RX  12/10/21 90 with 1 refill sent to pt pharmacy. Next OV No future appointments.       PROTOCOL pass

## 2021-12-17 DIAGNOSIS — E78.2 MIXED HYPERLIPIDEMIA: ICD-10-CM

## 2021-12-17 DIAGNOSIS — E11.65 UNCONTROLLED TYPE 2 DIABETES MELLITUS WITH HYPERGLYCEMIA (HCC): ICD-10-CM

## 2021-12-17 DIAGNOSIS — E11.40 TYPE 2 DIABETES MELLITUS WITH DIABETIC NEUROPATHY, WITHOUT LONG-TERM CURRENT USE OF INSULIN (HCC): ICD-10-CM

## 2021-12-21 RX ORDER — ATORVASTATIN CALCIUM 20 MG/1
TABLET, FILM COATED ORAL
Qty: 30 TABLET | Refills: 0 | OUTPATIENT
Start: 2021-12-21

## 2021-12-21 NOTE — TELEPHONE ENCOUNTER
Cholesterol Medication Protocol Passed 12/17/2021 05:02 PM   Protocol Details  ALT < 80    ALT resulted within past year    Lipid panel within past 12 months    Appointment within past 12 or next 3 months     LOV  12/10/21     LAST LAB 7/27/21     LAST RX

## 2021-12-27 DIAGNOSIS — E11.40 TYPE 2 DIABETES MELLITUS WITH DIABETIC NEUROPATHY, WITHOUT LONG-TERM CURRENT USE OF INSULIN (HCC): ICD-10-CM

## 2021-12-27 DIAGNOSIS — E11.65 UNCONTROLLED TYPE 2 DIABETES MELLITUS WITH HYPERGLYCEMIA (HCC): ICD-10-CM

## 2021-12-28 RX ORDER — PEN NEEDLE, DIABETIC 31 GX5/16"
NEEDLE, DISPOSABLE MISCELLANEOUS
Qty: 100 EACH | Refills: 0 | Status: SHIPPED | OUTPATIENT
Start: 2021-12-28

## 2021-12-28 NOTE — TELEPHONE ENCOUNTER
Diabetic Supplies Protocol Passed 12/27/2021 06:06 PM    Appointment in the past 12 or next 3 months     rx approved per protocol.

## 2022-01-19 ENCOUNTER — TELEPHONE (OUTPATIENT)
Dept: FAMILY MEDICINE CLINIC | Facility: CLINIC | Age: 72
End: 2022-01-19

## 2022-01-19 NOTE — TELEPHONE ENCOUNTER
Patient's daughter stated patient has been having pain in her back by the kidney since 1/17 in the evening. Then this morning the pain is in the abdomen. Urine is a dark color. No urgency or burning.

## 2022-01-19 NOTE — TELEPHONE ENCOUNTER
Spoke to patient's daughter who states symptoms started on Monday night with deep lower midabdominal pain and right flank pain. Denies any blood in urine, states it is a dark yellow color. Denies fever, N/V.   Denies any burning, pain or urgency with urinat

## 2022-01-20 ENCOUNTER — OFFICE VISIT (OUTPATIENT)
Dept: FAMILY MEDICINE CLINIC | Facility: CLINIC | Age: 72
End: 2022-01-20
Payer: MEDICAID

## 2022-01-20 VITALS
OXYGEN SATURATION: 96 % | HEIGHT: 62.6 IN | TEMPERATURE: 97 F | WEIGHT: 153 LBS | DIASTOLIC BLOOD PRESSURE: 74 MMHG | SYSTOLIC BLOOD PRESSURE: 130 MMHG | BODY MASS INDEX: 27.45 KG/M2 | RESPIRATION RATE: 18 BRPM | HEART RATE: 96 BPM

## 2022-01-20 DIAGNOSIS — R31.9 URINARY TRACT INFECTION WITH HEMATURIA, SITE UNSPECIFIED: ICD-10-CM

## 2022-01-20 DIAGNOSIS — N39.0 URINARY TRACT INFECTION WITH HEMATURIA, SITE UNSPECIFIED: ICD-10-CM

## 2022-01-20 DIAGNOSIS — R10.31 RIGHT LOWER QUADRANT ABDOMINAL PAIN: Primary | ICD-10-CM

## 2022-01-20 DIAGNOSIS — Z12.31 VISIT FOR SCREENING MAMMOGRAM: ICD-10-CM

## 2022-01-20 DIAGNOSIS — M70.61 GREATER TROCHANTERIC BURSITIS, RIGHT: ICD-10-CM

## 2022-01-20 PROBLEM — N18.30 CHRONIC RENAL DISEASE, STAGE III (HCC): Status: ACTIVE | Noted: 2022-01-20

## 2022-01-20 LAB
BILIRUBIN: NEGATIVE
GLUCOSE (URINE DIPSTICK): NEGATIVE MG/DL
KETONES (URINE DIPSTICK): NEGATIVE MG/DL
MULTISTIX LOT#: 5577 NUMERIC
NITRITE, URINE: NEGATIVE
PH, URINE: 6 (ref 4.5–8)
SPECIFIC GRAVITY: 1.02 (ref 1–1.03)
URINE-COLOR: YELLOW
UROBILINOGEN,SEMI-QN: 0.2 MG/DL (ref 0–1.9)

## 2022-01-20 PROCEDURE — 87086 URINE CULTURE/COLONY COUNT: CPT | Performed by: FAMILY MEDICINE

## 2022-01-20 PROCEDURE — 99214 OFFICE O/P EST MOD 30 MIN: CPT | Performed by: FAMILY MEDICINE

## 2022-01-20 PROCEDURE — 3075F SYST BP GE 130 - 139MM HG: CPT | Performed by: FAMILY MEDICINE

## 2022-01-20 PROCEDURE — 87186 SC STD MICRODIL/AGAR DIL: CPT | Performed by: FAMILY MEDICINE

## 2022-01-20 PROCEDURE — 87077 CULTURE AEROBIC IDENTIFY: CPT | Performed by: FAMILY MEDICINE

## 2022-01-20 PROCEDURE — 3008F BODY MASS INDEX DOCD: CPT | Performed by: FAMILY MEDICINE

## 2022-01-20 PROCEDURE — 81003 URINALYSIS AUTO W/O SCOPE: CPT | Performed by: FAMILY MEDICINE

## 2022-01-20 PROCEDURE — 3078F DIAST BP <80 MM HG: CPT | Performed by: FAMILY MEDICINE

## 2022-01-20 RX ORDER — CIPROFLOXACIN 500 MG/1
500 TABLET, FILM COATED ORAL 2 TIMES DAILY
Qty: 20 TABLET | Refills: 0 | Status: SHIPPED | OUTPATIENT
Start: 2022-01-20 | End: 2022-01-30

## 2022-01-20 NOTE — PATIENT INSTRUCTIONS
What Is Bursitis? A bursa is a fluid-filled sac. It helps cushion the muscles, tendons, and bones around a joint. When a bursa becomes inflamed, it’s called bursitis.  Common symptoms are pain, tenderness, and swelling that limits movement of the joint and soft tissues in and around joints. A bursa cushions and protects a joint. It keeps parts of a joint from rubbing against each other. If a bursa becomes inflamed and irritated, it's known as bursitis. The trochanteric bursa is found on the hip joint. the problem may not go away, may return, or may get worse. Rest and treat your hip as directed.    When to call your healthcare provider  Call your healthcare provider right away if you have any of these:  · Fever of 100.4°F (38°C) or higher, or as directed and lower back pain. Medicines to treat a UTI  Most UTIs are treated with antibiotics. These kill the bacteria. The length of time you need to take them depends on the type of infection. It may be as short as 3 days.  If you have repeated UTIs, you may n more treatment may be started. [employer] last reviewed this educational content on 9/1/2021    © 6037-2187 [employer] [address]. All rights reserved. This information is not intended as a substitute for professional medical care.  Always follow your he

## 2022-01-20 NOTE — PROGRESS NOTES
Charline Velasco is a 66year old female. Patient presents with:  Flank Pain  Abdominal Pain    HPI:   Charline Velasco is a 66year old female complaining of bilateral flank pain and lower abdominal pain since Monday.   Patient states started with left flank pa 11   • aspirin 81 MG Oral Tab Take 1 tablet (81 mg total) by mouth daily.  90 tablet 5      Past Medical History:   Diagnosis Date   • Cataract    • Type II or unspecified type diabetes mellitus without mention of complication, not stated as uncontrolled

## 2022-01-24 ENCOUNTER — TELEPHONE (OUTPATIENT)
Dept: FAMILY MEDICINE CLINIC | Facility: CLINIC | Age: 72
End: 2022-01-24

## 2022-02-15 ENCOUNTER — TELEPHONE (OUTPATIENT)
Dept: FAMILY MEDICINE CLINIC | Facility: CLINIC | Age: 72
End: 2022-02-15

## 2022-02-15 NOTE — TELEPHONE ENCOUNTER
Called patient's daughter who states they have noted that the patient's abdomen has been gradually getting bigger. States it is soft, non-tender, no pain. It just appears to be bulging out more and her clothes are feeling tighter. Denies fever, N/V/D or constipation. States it is not related at all to the pain she had in January when she saw Dr. Cm Evangelista. Patient's diet has not changed. Is not very active. Works in [employer], otherwise sits. Wants to know if an US should be done to see if anything is wrong. Advised needs to be seen to determine any testing or workup needed. Appt scheduled.   Future Appointments   Date Time Provider Jaison Suad   2/21/2022 10:00 AM Olga Huang MD [facility]

## 2022-02-21 ENCOUNTER — OFFICE VISIT (OUTPATIENT)
Dept: FAMILY MEDICINE CLINIC | Facility: CLINIC | Age: 72
End: 2022-02-21
Payer: MEDICAID

## 2022-02-21 VITALS
HEIGHT: 62.6 IN | WEIGHT: 153 LBS | SYSTOLIC BLOOD PRESSURE: 136 MMHG | RESPIRATION RATE: 18 BRPM | TEMPERATURE: 97 F | DIASTOLIC BLOOD PRESSURE: 84 MMHG | BODY MASS INDEX: 27.45 KG/M2 | OXYGEN SATURATION: 98 % | HEART RATE: 71 BPM

## 2022-02-21 DIAGNOSIS — R10.12 LEFT UPPER QUADRANT ABDOMINAL PAIN: ICD-10-CM

## 2022-02-21 DIAGNOSIS — R14.0 ABDOMINAL DISTENSION: Primary | ICD-10-CM

## 2022-02-21 PROCEDURE — 3075F SYST BP GE 130 - 139MM HG: CPT | Performed by: FAMILY MEDICINE

## 2022-02-21 PROCEDURE — 99213 OFFICE O/P EST LOW 20 MIN: CPT | Performed by: FAMILY MEDICINE

## 2022-02-21 PROCEDURE — 3008F BODY MASS INDEX DOCD: CPT | Performed by: FAMILY MEDICINE

## 2022-02-21 PROCEDURE — 3079F DIAST BP 80-89 MM HG: CPT | Performed by: FAMILY MEDICINE

## 2022-02-21 NOTE — PATIENT INSTRUCTIONS
Will call wit US results. Please schedule a follow up with . Please repeat labs and follow up for your diabetes.

## 2022-03-04 RX ORDER — INSULIN GLARGINE 100 [IU]/ML
30 INJECTION, SOLUTION SUBCUTANEOUS EVERY MORNING
Qty: 9 ML | Refills: 1 | Status: SHIPPED | OUTPATIENT
Start: 2022-03-04

## 2022-03-04 NOTE — TELEPHONE ENCOUNTER
Pt's daughter calling needing a refill on her pen. Said she does not want her to be without it for the weekend. Also said she called [facility] and they need approval from the doctor.  Please advise    insulin glargine (LANTUS SOLOSTAR) 100 UNIT/ML Subcutaneous Solution Pen-injector

## 2022-03-15 RX ORDER — FUROSEMIDE 40 MG/1
40 TABLET ORAL DAILY
Qty: 90 TABLET | Refills: 0 | Status: SHIPPED | OUTPATIENT
Start: 2022-03-15

## 2022-04-11 ENCOUNTER — TELEPHONE (OUTPATIENT)
Dept: FAMILY MEDICINE CLINIC | Facility: CLINIC | Age: 72
End: 2022-04-11

## 2022-04-11 ENCOUNTER — OFFICE VISIT (OUTPATIENT)
Dept: FAMILY MEDICINE CLINIC | Facility: CLINIC | Age: 72
End: 2022-04-11
Payer: MEDICAID

## 2022-04-11 VITALS
OXYGEN SATURATION: 98 % | WEIGHT: 152.25 LBS | RESPIRATION RATE: 16 BRPM | HEIGHT: 62.6 IN | HEART RATE: 65 BPM | DIASTOLIC BLOOD PRESSURE: 80 MMHG | BODY MASS INDEX: 27.32 KG/M2 | TEMPERATURE: 98 F | SYSTOLIC BLOOD PRESSURE: 120 MMHG

## 2022-04-11 DIAGNOSIS — Z00.00 ROUTINE GENERAL MEDICAL EXAMINATION AT A HEALTH CARE FACILITY: Primary | ICD-10-CM

## 2022-04-11 DIAGNOSIS — E78.2 MIXED HYPERLIPIDEMIA: ICD-10-CM

## 2022-04-11 DIAGNOSIS — Z00.00 LABORATORY EXAM ORDERED AS PART OF ROUTINE GENERAL MEDICAL EXAMINATION: ICD-10-CM

## 2022-04-11 DIAGNOSIS — Z12.31 VISIT FOR SCREENING MAMMOGRAM: ICD-10-CM

## 2022-04-11 DIAGNOSIS — E11.40 TYPE 2 DIABETES MELLITUS WITH DIABETIC NEUROPATHY, WITHOUT LONG-TERM CURRENT USE OF INSULIN (HCC): ICD-10-CM

## 2022-04-11 DIAGNOSIS — Z78.0 POSTMENOPAUSAL: ICD-10-CM

## 2022-04-11 DIAGNOSIS — E55.9 VITAMIN D DEFICIENCY: ICD-10-CM

## 2022-04-11 DIAGNOSIS — R19.00 ABDOMINAL WALL BULGE: ICD-10-CM

## 2022-04-11 DIAGNOSIS — I10 ESSENTIAL HYPERTENSION WITH GOAL BLOOD PRESSURE LESS THAN 140/90: ICD-10-CM

## 2022-04-11 DIAGNOSIS — Z23 NEED FOR VACCINATION: ICD-10-CM

## 2022-04-11 PROCEDURE — 90750 HZV VACC RECOMBINANT IM: CPT | Performed by: FAMILY MEDICINE

## 2022-04-11 PROCEDURE — 99214 OFFICE O/P EST MOD 30 MIN: CPT | Performed by: FAMILY MEDICINE

## 2022-04-11 PROCEDURE — 3079F DIAST BP 80-89 MM HG: CPT | Performed by: FAMILY MEDICINE

## 2022-04-11 PROCEDURE — 99397 PER PM REEVAL EST PAT 65+ YR: CPT | Performed by: FAMILY MEDICINE

## 2022-04-11 PROCEDURE — 90471 IMMUNIZATION ADMIN: CPT | Performed by: FAMILY MEDICINE

## 2022-04-11 PROCEDURE — 3074F SYST BP LT 130 MM HG: CPT | Performed by: FAMILY MEDICINE

## 2022-04-11 PROCEDURE — 3008F BODY MASS INDEX DOCD: CPT | Performed by: FAMILY MEDICINE

## 2022-04-11 RX ORDER — ATORVASTATIN CALCIUM 20 MG/1
20 TABLET, FILM COATED ORAL NIGHTLY
Qty: 90 TABLET | Refills: 1 | Status: SHIPPED | OUTPATIENT
Start: 2022-04-11

## 2022-04-11 RX ORDER — BLOOD SUGAR DIAGNOSTIC
STRIP MISCELLANEOUS
Qty: 100 EACH | Refills: 2 | Status: SHIPPED | OUTPATIENT
Start: 2022-04-11

## 2022-04-11 RX ORDER — PEN NEEDLE, DIABETIC 31 GX5/16"
1 NEEDLE, DISPOSABLE MISCELLANEOUS DAILY
Qty: 100 EACH | Refills: 2 | Status: SHIPPED | OUTPATIENT
Start: 2022-04-11

## 2022-04-11 RX ORDER — LANCETS 33 GAUGE
1 EACH MISCELLANEOUS DAILY
Qty: 100 EACH | Refills: 2 | Status: SHIPPED | OUTPATIENT
Start: 2022-04-11

## 2022-04-11 RX ORDER — LOSARTAN POTASSIUM 100 MG/1
100 TABLET ORAL DAILY
Qty: 90 TABLET | Refills: 1 | Status: SHIPPED | OUTPATIENT
Start: 2022-04-11

## 2022-04-11 RX ORDER — GLIMEPIRIDE 4 MG/1
4 TABLET ORAL
Qty: 90 TABLET | Refills: 1 | Status: SHIPPED | OUTPATIENT
Start: 2022-04-11

## 2022-04-11 RX ORDER — CARVEDILOL 6.25 MG/1
6.25 TABLET ORAL 2 TIMES DAILY WITH MEALS
Qty: 180 TABLET | Refills: 1 | Status: CANCELLED | OUTPATIENT
Start: 2022-04-11

## 2022-04-12 LAB
ABSOLUTE BASOPHILS: 50 CELLS/UL (ref 0–200)
ABSOLUTE EOSINOPHILS: 269 CELLS/UL (ref 15–500)
ABSOLUTE LYMPHOCYTES: 974 CELLS/UL (ref 850–3900)
ABSOLUTE MONOCYTES: 392 CELLS/UL (ref 200–950)
ABSOLUTE NEUTROPHILS: 3914 CELLS/UL (ref 1500–7800)
ALBUMIN/GLOBULIN RATIO: 1.2 (CALC) (ref 1–2.5)
ALBUMIN: 4.1 G/DL (ref 3.6–5.1)
ALKALINE PHOSPHATASE: 85 U/L (ref 37–153)
ALT: 13 U/L (ref 6–29)
AST: 17 U/L (ref 10–35)
BASOPHILS: 0.9 %
BILIRUBIN, TOTAL: 0.6 MG/DL (ref 0.2–1.2)
BUN/CREATININE RATIO: 15 (CALC) (ref 6–22)
BUN: 21 MG/DL (ref 7–25)
CALCIUM: 9.4 MG/DL (ref 8.6–10.4)
CARBON DIOXIDE: 31 MMOL/L (ref 20–32)
CHLORIDE: 105 MMOL/L (ref 98–110)
CHOL/HDLC RATIO: 3 (CALC)
CHOLESTEROL, TOTAL: 115 MG/DL
CREATININE, RANDOM URINE: 70 MG/DL (ref 20–275)
CREATININE: 1.38 MG/DL (ref 0.6–0.93)
EGFR IF AFRICN AM: 44 ML/MIN/1.73M2
EGFR IF NONAFRICN AM: 38 ML/MIN/1.73M2
EOSINOPHILS: 4.8 %
GLOBULIN: 3.4 G/DL (CALC) (ref 1.9–3.7)
GLUCOSE: 76 MG/DL (ref 65–99)
HDL CHOLESTEROL: 38 MG/DL
HEMATOCRIT: 39 % (ref 35–45)
HEMOGLOBIN A1C: 8 % OF TOTAL HGB
HEMOGLOBIN: 12.9 G/DL (ref 11.7–15.5)
LDL-CHOLESTEROL: 57 MG/DL (CALC)
LYMPHOCYTES: 17.4 %
MCH: 28.9 PG (ref 27–33)
MCHC: 33.1 G/DL (ref 32–36)
MCV: 87.2 FL (ref 80–100)
MICROALBUMIN/CREATININE RATIO, RANDOM URINE: 40 MCG/MG CREAT
MICROALBUMIN: 2.8 MG/DL
MONOCYTES: 7 %
MPV: 11.3 FL (ref 7.5–12.5)
NEUTROPHILS: 69.9 %
NON-HDL CHOLESTEROL: 77 MG/DL (CALC)
PLATELET COUNT: 226 THOUSAND/UL (ref 140–400)
POTASSIUM: 4 MMOL/L (ref 3.5–5.3)
PROTEIN, TOTAL: 7.5 G/DL (ref 6.1–8.1)
RDW: 12.9 % (ref 11–15)
RED BLOOD CELL COUNT: 4.47 MILLION/UL (ref 3.8–5.1)
SODIUM: 142 MMOL/L (ref 135–146)
TRIGLYCERIDES: 121 MG/DL
TSH W/REFLEX TO FT4: 1.23 MIU/L (ref 0.4–4.5)
VITAMIN D, 25-OH, TOTAL: 28 NG/ML (ref 30–100)
WHITE BLOOD CELL COUNT: 5.6 THOUSAND/UL (ref 3.8–10.8)

## 2022-04-21 RX ORDER — INSULIN GLARGINE 100 [IU]/ML
INJECTION, SOLUTION SUBCUTANEOUS
Qty: 15 ML | Refills: 0 | Status: SHIPPED | OUTPATIENT
Start: 2022-04-21

## 2022-04-25 DIAGNOSIS — R60.0 BILATERAL LOWER EXTREMITY EDEMA: ICD-10-CM

## 2022-04-25 DIAGNOSIS — I10 ESSENTIAL HYPERTENSION WITH GOAL BLOOD PRESSURE LESS THAN 140/90: ICD-10-CM

## 2022-04-25 RX ORDER — CARVEDILOL 6.25 MG/1
6.25 TABLET ORAL 2 TIMES DAILY WITH MEALS
Qty: 180 TABLET | Refills: 0 | Status: SHIPPED | OUTPATIENT
Start: 2022-04-25 | End: 2022-06-01

## 2022-04-28 ENCOUNTER — TELEPHONE (OUTPATIENT)
Dept: FAMILY MEDICINE CLINIC | Facility: CLINIC | Age: 72
End: 2022-04-28

## 2022-05-06 ENCOUNTER — TELEPHONE (OUTPATIENT)
Dept: FAMILY MEDICINE CLINIC | Facility: CLINIC | Age: 72
End: 2022-05-06

## 2022-05-06 NOTE — TELEPHONE ENCOUNTER
Spoke to patient's daughter who states patient is feeling fine. No s/s of hypoglycemia. States patient has not eaten breakfast yet, she is just getting ready to eat. Also, patient tested positive for covid on Monday. Advised to take insulin as prescribed as BS is normal.  Eat a healthy breakfast with protein and high fiver carb. Check BS two hours after eating  And call back with result. States patient's covid symptoms were runny nose sore throat and cough. Has been taking rosalio seltzer cold and flu and symptoms are improving. Advised to push fluids, warm tea w honey/lemon.

## 2022-05-06 NOTE — TELEPHONE ENCOUNTER
Called patient's daughter for update on condition. Patient forgot to check blood sugar two hrs after eating breakfast.  States patient feels fine. Will be eating lunch soon. Advised to check BS two hrs after eating and send [employer] message with result. Advised illness like covid can effect Blood Sugars. If patient has any symptoms of low or high blood sugar, should go to IC for evaluation for possible medication adjustment. Verbalizes understanding.

## 2022-05-06 NOTE — TELEPHONE ENCOUNTER
Insulin dose was increased to 34 units and her blood sugar today 69 this morning- having breakfast now but typically takes insulin before breakfast. Should she wait to take insulin?     Please advise

## 2022-05-18 ENCOUNTER — HOSPITAL ENCOUNTER (OUTPATIENT)
Dept: MAMMOGRAPHY | Age: 72
Discharge: HOME | End: 2022-05-18
Attending: FAMILY MEDICINE
Payer: MEDICAID

## 2022-05-18 DIAGNOSIS — Z12.31 VISIT FOR SCREENING MAMMOGRAM: ICD-10-CM

## 2022-05-18 PROCEDURE — 77067 SCR MAMMO BI INCL CAD: CPT | Performed by: FAMILY MEDICINE

## 2022-05-18 PROCEDURE — 77063 BREAST TOMOSYNTHESIS BI: CPT | Performed by: FAMILY MEDICINE

## 2022-05-31 ENCOUNTER — TELEPHONE (OUTPATIENT)
Dept: FAMILY MEDICINE CLINIC | Facility: CLINIC | Age: 72
End: 2022-05-31

## 2022-05-31 NOTE — TELEPHONE ENCOUNTER
Reviewed chart, final mammogram report shows no suspicious change and no evidence of malignancy. MBI is suggested as supplemental screening due to dense breast tissue. No additional views were requested. Called and spoke to patient's daughter and explained above. Advised first step is to contact patient's insurance to see if they would even cover an MBI test.  Verbalizes understanding.     Future Appointments   Date Time Provider Jaison Borjas   6/1/2022 11:00 AM Shoaib Card, DO [facility]

## 2022-05-31 NOTE — TELEPHONE ENCOUNTER
Pt's daughter said she called last week to s/w nurse re: additional views needed for mammogram, pt traveling out of country on 6/9 and daughter said they need an appt asap for orders and med refills, schedule with  for tomorrow

## 2022-06-01 ENCOUNTER — OFFICE VISIT (OUTPATIENT)
Dept: FAMILY MEDICINE CLINIC | Facility: CLINIC | Age: 72
End: 2022-06-01
Payer: MEDICAID

## 2022-06-01 VITALS
TEMPERATURE: 97 F | RESPIRATION RATE: 16 BRPM | HEIGHT: 60.63 IN | BODY MASS INDEX: 28.95 KG/M2 | WEIGHT: 151.38 LBS | SYSTOLIC BLOOD PRESSURE: 128 MMHG | HEART RATE: 70 BPM | OXYGEN SATURATION: 100 % | DIASTOLIC BLOOD PRESSURE: 84 MMHG

## 2022-06-01 DIAGNOSIS — E78.2 MIXED HYPERLIPIDEMIA: ICD-10-CM

## 2022-06-01 DIAGNOSIS — I10 ESSENTIAL HYPERTENSION WITH GOAL BLOOD PRESSURE LESS THAN 140/90: ICD-10-CM

## 2022-06-01 DIAGNOSIS — R60.0 BILATERAL LOWER EXTREMITY EDEMA: ICD-10-CM

## 2022-06-01 DIAGNOSIS — E11.40 TYPE 2 DIABETES MELLITUS WITH DIABETIC NEUROPATHY, WITH LONG-TERM CURRENT USE OF INSULIN (HCC): ICD-10-CM

## 2022-06-01 DIAGNOSIS — E11.65 UNCONTROLLED TYPE 2 DIABETES MELLITUS WITH HYPERGLYCEMIA (HCC): ICD-10-CM

## 2022-06-01 DIAGNOSIS — Z79.4 TYPE 2 DIABETES MELLITUS WITH DIABETIC NEUROPATHY, WITH LONG-TERM CURRENT USE OF INSULIN (HCC): ICD-10-CM

## 2022-06-01 PROCEDURE — 3079F DIAST BP 80-89 MM HG: CPT | Performed by: FAMILY MEDICINE

## 2022-06-01 PROCEDURE — 3008F BODY MASS INDEX DOCD: CPT | Performed by: FAMILY MEDICINE

## 2022-06-01 PROCEDURE — 99213 OFFICE O/P EST LOW 20 MIN: CPT | Performed by: FAMILY MEDICINE

## 2022-06-01 PROCEDURE — 3074F SYST BP LT 130 MM HG: CPT | Performed by: FAMILY MEDICINE

## 2022-06-01 RX ORDER — GLIMEPIRIDE 4 MG/1
4 TABLET ORAL
Qty: 90 TABLET | Refills: 1 | Status: SHIPPED | OUTPATIENT
Start: 2022-06-01

## 2022-06-01 RX ORDER — CARVEDILOL 6.25 MG/1
6.25 TABLET ORAL 2 TIMES DAILY WITH MEALS
Qty: 180 TABLET | Refills: 1 | Status: SHIPPED | OUTPATIENT
Start: 2022-06-01

## 2022-06-01 RX ORDER — ATORVASTATIN CALCIUM 20 MG/1
20 TABLET, FILM COATED ORAL NIGHTLY
Qty: 90 TABLET | Refills: 1 | Status: SHIPPED | OUTPATIENT
Start: 2022-06-01

## 2022-06-01 RX ORDER — INSULIN GLARGINE 100 [IU]/ML
34 INJECTION, SOLUTION SUBCUTANEOUS EVERY MORNING
Qty: 45 ML | Refills: 1 | Status: SHIPPED | OUTPATIENT
Start: 2022-06-01

## 2022-06-01 RX ORDER — LOSARTAN POTASSIUM 100 MG/1
100 TABLET ORAL DAILY
Qty: 90 TABLET | Refills: 1 | Status: SHIPPED | OUTPATIENT
Start: 2022-06-01

## 2022-06-01 RX ORDER — HYDRALAZINE HYDROCHLORIDE 25 MG/1
25 TABLET, FILM COATED ORAL 3 TIMES DAILY
Qty: 270 TABLET | Refills: 1 | Status: SHIPPED | OUTPATIENT
Start: 2022-06-01

## 2022-06-01 RX ORDER — ASPIRIN 81 MG/1
81 TABLET ORAL DAILY
Qty: 90 TABLET | Refills: 1 | Status: SHIPPED | OUTPATIENT
Start: 2022-06-01

## 2022-06-01 RX ORDER — LANCETS 33 GAUGE
1 EACH MISCELLANEOUS DAILY
Qty: 100 EACH | Refills: 2 | Status: SHIPPED | OUTPATIENT
Start: 2022-06-01

## 2022-06-01 RX ORDER — BLOOD SUGAR DIAGNOSTIC
STRIP MISCELLANEOUS
Qty: 100 EACH | Refills: 2 | Status: SHIPPED | OUTPATIENT
Start: 2022-06-01

## 2022-06-01 RX ORDER — FUROSEMIDE 40 MG/1
40 TABLET ORAL DAILY
Qty: 90 TABLET | Refills: 1 | Status: SHIPPED | OUTPATIENT
Start: 2022-06-01

## 2022-06-01 RX ORDER — PEN NEEDLE, DIABETIC 31 GX5/16"
1 NEEDLE, DISPOSABLE MISCELLANEOUS DAILY
Qty: 100 EACH | Refills: 2 | Status: SHIPPED | OUTPATIENT
Start: 2022-06-01

## 2022-06-01 NOTE — PATIENT INSTRUCTIONS
Continue all your medication as prescribed. Get up an walk at least 3 times when flying to [facility]. Get your second COVID booster before you travel. See Dr. Valentina Ríos when you return from [place] for recheck on your diabetes.

## 2022-11-17 ENCOUNTER — TELEPHONE (OUTPATIENT)
Dept: FAMILY MEDICINE CLINIC | Facility: CLINIC | Age: 72
End: 2022-11-17

## 2022-12-15 NOTE — TELEPHONE ENCOUNTER
Hypertension Medications Protocol Passed3/23 10:21 AM   CMP or BMP in past 12 months    Last serum creatinine< 2.0    Appointment in past 6 or next 3 months     LOV 2/27/2020    LAST LAB 3/3/20    LAST RX 2/26/220 60 tabs 0 refills     Next OV No future ap Therapy Recommended - Med Rec related

## 2023-06-21 NOTE — TELEPHONE ENCOUNTER
(Portions of this note were dictated using voice recognition software and may contain dictation related errors in spelling/grammar/syntax not found on text review)    CC:   Chief Complaint   Patient presents with    Annual Exam       HPI: 42 y.o. male presented for annual examination as a new patient to me, he has no PCP.  He has nonsignificant medical history on file, does not take any prescription medications.  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 He is healthy, does regular exercise and work out. He does not smoke, has no toxic habits. He denies having nay other symptoms or concerns.     History reviewed. No pertinent past medical history.    History reviewed. No pertinent surgical history.    History reviewed. No pertinent family history.    Social History     Tobacco Use    Smoking status: Never    Smokeless tobacco: Never   Substance Use Topics    Alcohol use: Yes    Drug use: Never       Lab Results   Component Value Date    WBC 6.52 10/20/2022    HGB 16.3 10/20/2022    HCT 47.7 10/20/2022    MCV 90 10/20/2022     10/20/2022    ALT 43 10/20/2022    AST 30 10/20/2022    BILITOT 0.6 10/20/2022    ALKPHOS 69 10/20/2022     10/20/2022    K 3.7 10/20/2022     10/20/2022    CREATININE 1.2 10/20/2022    CALCIUM 9.0 10/20/2022    ALBUMIN 4.1 10/20/2022    BUN 17 10/20/2022    CO2 28 10/20/2022     (H) 10/20/2022             Vital signs reviewed  PE:   APPEARANCE: Well nourished, well developed, in no acute distress.    HEAD: Normocephalic, atraumatic.  EYES: EOMI.  Conjunctivae noninjected..   NECK: Supple with no cervical lymphadenopathy.    CHEST: Good inspiratory effort. Lungs clear to auscultation with no wheezes or crackles.  CARDIOVASCULAR: Normal S1, S2. No rubs, murmurs, or gallops.  ABDOMEN: Bowel sounds normal. Not distended. Soft. No  Telehealth visit done 4/17/20  No refills given at that time. How long would you like to fill for? tenderness or masses. No organomegaly.  EXTREMITIES: No edema, cyanosis, or clubbing.    Review of Systems   Constitutional:  Negative for chills, fatigue and fever.   HENT: Negative.     Respiratory:  Negative for cough, shortness of breath and wheezing.    Cardiovascular:  Negative for chest pain, palpitations and leg swelling.   Gastrointestinal: Negative.    Genitourinary: Negative.    Musculoskeletal: Negative.    Neurological: Negative.    Psychiatric/Behavioral: Negative.     All other systems reviewed and are negative.    IMPRESSION  1. Annual physical exam    2. Screening for HIV (human immunodeficiency virus)    3. Encounter for hepatitis C screening test for low risk patient    4. Encounter for completion of form with patient    5. BMI 26.0-26.9,adult            PLAN      1. Annual physical exam    - CBC Auto Differential; Future  - Comprehensive Metabolic Panel; Future  - TSH; Future  - Lipid Panel; Future  - Hemoglobin A1C; Future      2. Screening for HIV (human immunodeficiency virus)    - HIV 1/2 Ag/Ab (4th Gen); Future      3. Encounter for hepatitis C screening test for low risk patient    - Hepatitis C Antibody; Future      4. Encounter for completion of form with patient    Health fitness form competed and handed over to pt      5. BMI 26.0-26.9,adult    Counseling provided on healthy lifestyle, encouraged to do moderate intensity regular exercise 30 minutes every day 5 days a week, to include vegetables and fruits and cut back on saturated fats and carbohydrates.          SCREENINGS          Age/demographic appropriate health maintenance:    Health Maintenance Due   Topic Date Due    Hepatitis C Screening  Never done    Lipid Panel  Never done    COVID-19 Vaccine (1) Never done    HIV Screening  Never done    TETANUS VACCINE  Never done    Hemoglobin A1c (Diabetic Prevention Screening)  Never done             Graham Trejo   6/21/2023

## 2023-10-18 ENCOUNTER — TELEPHONE (OUTPATIENT)
Dept: FAMILY MEDICINE CLINIC | Facility: CLINIC | Age: 73
End: 2023-10-18

## 2023-11-17 ENCOUNTER — TELEPHONE (OUTPATIENT)
Dept: FAMILY MEDICINE CLINIC | Facility: CLINIC | Age: 73
End: 2023-11-17

## 2023-11-17 NOTE — TELEPHONE ENCOUNTER
Spoke with patients daughter and she states patient has been out of country . Should be back in month or so . Will call us to schedule appt .

## 2024-07-16 ENCOUNTER — NURSE TRIAGE (OUTPATIENT)
Dept: INTERNAL MEDICINE CLINIC | Facility: CLINIC | Age: 74
End: 2024-07-16

## 2024-07-16 NOTE — TELEPHONE ENCOUNTER
Action Requested: Summary for Provider     []  Critical Lab, Recommendations Needed  [x] Need Additional Advice  []   FYI    []   Need Orders  [] Need Medications Sent to Pharmacy  []  Other     SUMMARY: Pt's daughter Thor(ok per HIPAA) called. She reports pt has been noticing small amount of vaginal bleeding/spotting when she wipes. This has been going on for the last 10 days. She states pt has history of uterine prolapse and thinks this might be what is happening again. She previously saw UroGyne and was given a pessary but she has stopped using it. She is requesting OV. Earliest appt available is 7/29/24(20 min). OV scheduled.     Dr. Peterson - would you be able to see pt before?    Reason for call: Vaginal Bleeding  Onset: 10 days ago  LOV: 4/11/22               Vaginal bleeding only when she wipes - started 10 days ago  Hx of uterine prolapse - was given Pessary but stopped using it    Reason for Disposition   Patient wants to be seen    Protocols used: Vaginal Bleeding - [id number]

## 2024-07-25 ENCOUNTER — OFFICE VISIT (OUTPATIENT)
Dept: FAMILY MEDICINE CLINIC | Facility: CLINIC | Age: 74
End: 2024-07-25
Payer: MEDICAID

## 2024-07-25 ENCOUNTER — LAB ENCOUNTER (OUTPATIENT)
Dept: LAB | Age: 74
End: 2024-07-25
Attending: FAMILY MEDICINE
Payer: MEDICAID

## 2024-07-25 VITALS
TEMPERATURE: 97 F | WEIGHT: 124.25 LBS | RESPIRATION RATE: 16 BRPM | BODY MASS INDEX: 22.87 KG/M2 | DIASTOLIC BLOOD PRESSURE: 70 MMHG | OXYGEN SATURATION: 99 % | HEIGHT: 62 IN | HEART RATE: 64 BPM | SYSTOLIC BLOOD PRESSURE: 134 MMHG

## 2024-07-25 DIAGNOSIS — Z00.00 LABORATORY EXAM ORDERED AS PART OF ROUTINE GENERAL MEDICAL EXAMINATION: ICD-10-CM

## 2024-07-25 DIAGNOSIS — Z79.4 TYPE 2 DIABETES MELLITUS WITH DIABETIC NEUROPATHY, WITH LONG-TERM CURRENT USE OF INSULIN (HCC): ICD-10-CM

## 2024-07-25 DIAGNOSIS — E11.40 TYPE 2 DIABETES MELLITUS WITH DIABETIC NEUROPATHY, WITH LONG-TERM CURRENT USE OF INSULIN (HCC): ICD-10-CM

## 2024-07-25 DIAGNOSIS — E78.2 MIXED HYPERLIPIDEMIA: ICD-10-CM

## 2024-07-25 DIAGNOSIS — N95.0 POSTMENOPAUSAL BLEEDING: ICD-10-CM

## 2024-07-25 DIAGNOSIS — N95.0 POSTMENOPAUSAL BLEEDING: Primary | ICD-10-CM

## 2024-07-25 DIAGNOSIS — I10 ESSENTIAL HYPERTENSION: ICD-10-CM

## 2024-07-25 DIAGNOSIS — N81.4 UTEROVAGINAL PROLAPSE: ICD-10-CM

## 2024-07-25 DIAGNOSIS — Z12.4 SCREENING FOR CERVICAL CANCER: ICD-10-CM

## 2024-07-25 LAB
ALBUMIN SERPL-MCNC: 4.6 G/DL (ref 3.2–4.8)
ALBUMIN/GLOB SERPL: 1.1 {RATIO} (ref 1–2)
ALP LIVER SERPL-CCNC: 90 U/L
ALT SERPL-CCNC: 13 U/L
ANION GAP SERPL CALC-SCNC: 6 MMOL/L (ref 0–18)
AST SERPL-CCNC: 21 U/L (ref ?–34)
BASOPHILS # BLD AUTO: 0.09 X10(3) UL (ref 0–0.2)
BASOPHILS NFR BLD AUTO: 1.4 %
BILIRUB SERPL-MCNC: 0.5 MG/DL (ref 0.2–1.1)
BUN BLD-MCNC: 38 MG/DL (ref 9–23)
CALCIUM BLD-MCNC: 9.9 MG/DL (ref 8.7–10.4)
CHLORIDE SERPL-SCNC: 103 MMOL/L (ref 98–112)
CHOLEST SERPL-MCNC: 189 MG/DL (ref ?–200)
CO2 SERPL-SCNC: 29 MMOL/L (ref 21–32)
CREAT BLD-MCNC: 1.74 MG/DL
EGFRCR SERPLBLD CKD-EPI 2021: 30 ML/MIN/1.73M2 (ref 60–?)
EOSINOPHIL # BLD AUTO: 0.43 X10(3) UL (ref 0–0.7)
EOSINOPHIL NFR BLD AUTO: 6.7 %
ERYTHROCYTE [DISTWIDTH] IN BLOOD BY AUTOMATED COUNT: 14.5 %
EST. AVERAGE GLUCOSE BLD GHB EST-MCNC: 140 MG/DL (ref 68–126)
FASTING PATIENT LIPID ANSWER: YES
FASTING STATUS PATIENT QL REPORTED: YES
GLOBULIN PLAS-MCNC: 4.2 G/DL (ref 2–3.5)
GLUCOSE BLD-MCNC: 108 MG/DL (ref 70–99)
HBA1C MFR BLD: 6.5 % (ref ?–5.7)
HCT VFR BLD AUTO: 40.8 %
HDLC SERPL-MCNC: 47 MG/DL (ref 40–59)
HGB BLD-MCNC: 13.9 G/DL
IMM GRANULOCYTES # BLD AUTO: 0.02 X10(3) UL (ref 0–1)
IMM GRANULOCYTES NFR BLD: 0.3 %
LDLC SERPL CALC-MCNC: 127 MG/DL (ref ?–100)
LYMPHOCYTES # BLD AUTO: 1.18 X10(3) UL (ref 1–4)
LYMPHOCYTES NFR BLD AUTO: 18.5 %
MCH RBC QN AUTO: 29.7 PG (ref 26–34)
MCHC RBC AUTO-ENTMCNC: 34.1 G/DL (ref 31–37)
MCV RBC AUTO: 87.2 FL
MONOCYTES # BLD AUTO: 0.6 X10(3) UL (ref 0.1–1)
MONOCYTES NFR BLD AUTO: 9.4 %
NEUTROPHILS # BLD AUTO: 4.06 X10 (3) UL (ref 1.5–7.7)
NEUTROPHILS # BLD AUTO: 4.06 X10(3) UL (ref 1.5–7.7)
NEUTROPHILS NFR BLD AUTO: 63.7 %
NONHDLC SERPL-MCNC: 142 MG/DL (ref ?–130)
OSMOLALITY SERPL CALC.SUM OF ELEC: 296 MOSM/KG (ref 275–295)
PLATELET # BLD AUTO: 257 10(3)UL (ref 150–450)
POTASSIUM SERPL-SCNC: 4.5 MMOL/L (ref 3.5–5.1)
PROT SERPL-MCNC: 8.8 G/DL (ref 5.7–8.2)
RBC # BLD AUTO: 4.68 X10(6)UL
SODIUM SERPL-SCNC: 138 MMOL/L (ref 136–145)
TRIGL SERPL-MCNC: 82 MG/DL (ref 30–149)
TSI SER-ACNC: 1.25 MIU/ML (ref 0.55–4.78)
VIT D+METAB SERPL-MCNC: 27 NG/ML (ref 30–100)
VLDLC SERPL CALC-MCNC: 15 MG/DL (ref 0–30)
WBC # BLD AUTO: 6.4 X10(3) UL (ref 4–11)

## 2024-07-25 PROCEDURE — 88175 CYTOPATH C/V AUTO FLUID REDO: CPT | Performed by: FAMILY MEDICINE

## 2024-07-25 PROCEDURE — 36415 COLL VENOUS BLD VENIPUNCTURE: CPT

## 2024-07-25 PROCEDURE — 82306 VITAMIN D 25 HYDROXY: CPT

## 2024-07-25 PROCEDURE — 87624 HPV HI-RISK TYP POOLED RSLT: CPT | Performed by: FAMILY MEDICINE

## 2024-07-25 PROCEDURE — 99214 OFFICE O/P EST MOD 30 MIN: CPT | Performed by: FAMILY MEDICINE

## 2024-07-25 PROCEDURE — 84443 ASSAY THYROID STIM HORMONE: CPT

## 2024-07-25 PROCEDURE — 83036 HEMOGLOBIN GLYCOSYLATED A1C: CPT

## 2024-07-25 PROCEDURE — 85025 COMPLETE CBC W/AUTO DIFF WBC: CPT

## 2024-07-25 PROCEDURE — 80061 LIPID PANEL: CPT

## 2024-07-25 PROCEDURE — 80053 COMPREHEN METABOLIC PANEL: CPT

## 2024-07-25 NOTE — PROGRESS NOTES
Subjective:   Patient ID: Tuyet Maciel is a 74 year old female.    HPI  74yr old female presents with daughter after 2yrs of being in [place] for follow-up on care and new concerns.  States she has been having vaginal bleeding on and off for the past couple months. However, has been more consistent for the past month. Notices it when she wipes and sometimes on her underwear. Denies any pain, UTI symptoms or vaginal discharge/odor. Denies any GI symptoms. Had not been seen in [place] for this per pt.   Hx of uterovaginal prolapse, which she says is very bothersome. Symptoms worse when she is laying down or sitting for prolonged amount of time.  Had seen urogynecologist while here several years ago.   DM2, has been taking Lantus 10u daily along with Glyxambi (empagliflozin/linagliptin) 5/25mg daily. Her fasting glucose levels have been in the 100-110s. Denies any paresthesias of her feet. Due for diabetic eye exam.   HTN, taking carvedilol 12.5mg po at bedtime and amlodipine/losartan 10/160mg once daily.   HL, not taking statin or asa anymore per pt.     History/Other:   Review of Systems   Respiratory:  Negative for shortness of breath.    Cardiovascular:  Negative for chest pain and palpitations.   Gastrointestinal:  Negative for abdominal pain, constipation, diarrhea, nausea and vomiting.   Genitourinary:  Positive for vaginal bleeding. Negative for dysuria, flank pain, frequency, hematuria, pelvic pain, vaginal discharge and vaginal pain.   Musculoskeletal:  Negative for back pain.     Current Outpatient Medications   Medication Sig Dispense Refill    insulin glargine 100 UNIT/ML Subcutaneous Solution Inject 10 Units into the skin every morning.      Empagliflozin-linaGLIPtin (GLYXAMBI) 25-5 MG Oral Tab Take 1 tablet by mouth daily.      carvedilol 12.5 MG Oral Tab Take 1 tablet (12.5 mg total) by mouth at bedtime.      amLODIPine 10 MG TABS 10 mg, losartan 100 MG TABS 100 mg Take by mouth daily.      Glucose  Blood (ONETOUCH ULTRA) In Vitro Strip USE TO CHECK GLUCOSE ONCE DAILY 100 each 2    Lancets (ONETOUCH DELICA PLUS [id number]) Does not apply Misc 1 strip by In Vitro route daily. 100 each 2     Allergies:No Known Allergies    Objective:   Physical Exam  Vitals and nursing note reviewed.   Constitutional:       Appearance: Normal appearance.   HENT:      Head: Normocephalic and atraumatic.   Cardiovascular:      Rate and Rhythm: Normal rate and regular rhythm.   Pulmonary:      Effort: Pulmonary effort is normal.      Breath sounds: Normal breath sounds. No wheezing, rhonchi or rales.   Abdominal:      General: Bowel sounds are normal.      Palpations: Abdomen is soft.      Tenderness: There is no abdominal tenderness. There is no guarding or rebound.   Genitourinary:     Labia:         Right: No rash.         Left: No rash.       Vagina: No bleeding.      Cervix: No cervical motion tenderness or discharge.      Uterus: With uterine prolapse (protruding out of vagina).    Skin:     General: Skin is warm and dry.   Neurological:      Mental Status: She is alert.   Psychiatric:         Mood and Affect: Mood normal.         Behavior: Behavior normal.         Assessment & Plan:   1. Postmenopausal bleeding  - discussed possible etiologies of PMB  - pap done for eval of cervical ca  - will obtain UA   - check pelvic US  - refer to gyne for endometrial biopsy  - monitor symptoms  - US PELVIS W EV (CPT=76856/35145); Future  - OBG Referral - [facility] ([facility])  - UA/M With Culture Reflex [E]; Future  - ThinPrep PAP with HPV Reflex Request; Future  - ThinPrep PAP with HPV Reflex Request  - Image-Guided Pap Smear ([facility])    2. Screening for cervical cancer  - ThinPrep PAP with HPV Reflex Request; Future  - ThinPrep PAP with HPV Reflex Request  - Image-Guided Pap Smear ([facility])    3. Uterovaginal prolapse  - Urogynecology Referral - In Network    4. Type 2 diabetes mellitus with diabetic neuropathy, with long-term current  use of insulin (HCC)  - cpm  - check diabetic labs  - follow low carb diet and exercise as able  - check blood glucose levels daily  - check feet daily  - obtain diabetic eye exam  - Comp Metabolic Panel; Future  - Lipid Panel; Future  - Hemoglobin A1C; Future  - Microalb/Creat Ratio, Random Urine [E]; Future    5. Essential hypertension  - bp stable  - cpm    6. Mixed hyperlipidemia  - off statin while in [place]  - check lipid panel  - follow low chol diet and exercise as able  - Comp Metabolic Panel; Future  - Lipid Panel; Future    7. Laboratory exam ordered as part of routine general medical examination  - CBC With Differential With Platelet; Future  - Comp Metabolic Panel; Future  - Lipid Panel; Future  - TSH W Reflex To Free T4; Future  - Vitamin D, 25-Hydroxy; Future    She and daughter understand and agree with tx plan  RTC after completing testing, sooner if needed      Meds This Visit:  Requested Prescriptions      No prescriptions requested or ordered in this encounter       Imaging & Referrals:  [facility] - INTERNAL  OP REFERRAL TO UROGYNECOLOGY CLINIC  US PELVIS W EV (CPT=76856/33836)

## 2024-07-28 RX ORDER — CARVEDILOL 12.5 MG/1
12.5 TABLET ORAL NIGHTLY
COMMUNITY

## 2024-07-28 RX ORDER — EMPAGLIFLOZIN AND LINAGLIPTIN 25; 5 MG/1; MG/1
1 TABLET, FILM COATED ORAL DAILY
COMMUNITY

## 2024-07-28 RX ORDER — INSULIN GLARGINE 100 [IU]/ML
10 INJECTION, SOLUTION SUBCUTANEOUS EVERY MORNING
COMMUNITY

## 2024-07-31 LAB
.: NORMAL
.: NORMAL
HPV E6+E7 MRNA CVX QL NAA+PROBE: NEGATIVE

## 2024-08-02 ENCOUNTER — TELEPHONE (OUTPATIENT)
Dept: FAMILY MEDICINE CLINIC | Facility: CLINIC | Age: 74
End: 2024-08-02

## 2024-09-09 ENCOUNTER — HOSPITAL ENCOUNTER (OUTPATIENT)
Dept: ULTRASOUND IMAGING | Age: 74
Discharge: HOME | End: 2024-09-09
Attending: FAMILY MEDICINE
Payer: MEDICAID

## 2024-09-09 DIAGNOSIS — N95.0 POSTMENOPAUSAL BLEEDING: ICD-10-CM

## 2024-09-09 PROCEDURE — 76856 US EXAM PELVIC COMPLETE: CPT | Performed by: FAMILY MEDICINE

## 2024-09-17 ENCOUNTER — TELEPHONE (OUTPATIENT)
Dept: INTERNAL MEDICINE CLINIC | Facility: CLINIC | Age: 74
End: 2024-09-17

## 2024-09-17 ENCOUNTER — TELEPHONE (OUTPATIENT)
Dept: FAMILY MEDICINE CLINIC | Facility: CLINIC | Age: 74
End: 2024-09-17

## 2024-09-17 NOTE — TELEPHONE ENCOUNTER
LOV 7/25/24     Received call from daughter Thor. Stated pt was seen by Dr. Peterson for uterine prolapse. Completed US, however daughter stated they weren't able to do internal US due to prolapse. Stated pt is having bleeding daily, not a lot but enough to go in pad. Pt is not in pain, but it's a \"daily bother\" for pt. Scheduled with gyne however not until October. Daughter wondering if this is okay to wait or if they need to take urgent action in ER.    Future Appointments   Date Time Provider Department Center   10/10/2024 12:00 PM Ruth Adam, DO [facility] -  Are you ok with apt above or should pt be seen sooner? Any further recommendations?

## 2024-09-17 NOTE — TELEPHONE ENCOUNTER
From: Tuyet Maciel  To: Kenia Peterson  Sent: 9/12/2024  7:46 PM CDT  Subject: Uterus prolapse    Hello. I have not been able to get any early appointment with the OBGYN. My September appointment was cancelled by the doctor and now I have a visit with Ruth Adam on 10/10. The problem is that my uterus, almost the size of an avocado is now outside my body permanently and is causing discomfort in movement throughout the day. I'm also experiencing pressure/pain in the abdominal area along with continued vaginal bleeding/spotting. Do I need to get it checked immediately or should I just wait for my appointment which is a very long wait given my situation.  Please advise.

## 2024-09-17 NOTE — TELEPHONE ENCOUNTER
Received below message in .  response was sent to call office to discuss sxs, no call received.     Called and spoke to pt's daughter, Thor (on HIPAA). Per Thor, pt has had issue with uterine prolapse for past 2 years. Patient has been able to push back in, but now unable to. She had US on 9/9, but stated they were unable to do full exam due to prolapse. Patient tried to schedule with gyne, Ruth Adam, but unable to get appointment until 10/10, they are questioning if sooner action needed. Patient open to seeing other gyne, but wanting to know who Dr. Peterson recommends.     Dr. Peterson, how soon should pt be seen for this? I saw after phone call with Thor there is referral entered to Dr. Gallagher. Will advise them to call her office if they have not already done so, but is there any other gyne recs you would like us to give them in case unable to get in soon with Dr. Gallagher?

## 2024-09-18 ENCOUNTER — TELEPHONE (OUTPATIENT)
Dept: OBGYN CLINIC | Facility: CLINIC | Age: 74
End: 2024-09-18

## 2024-09-18 NOTE — TELEPHONE ENCOUNTER
Spoke with Nurse ..Heidi.. from pt's PCP's office Dr. Peterson.  She is calling to see if we can get pt. In sooner to see MD, Dr. Adam.  Pt. Is very uncomfortable , She is scheduled not until 10/10/24., because of Dr. Adam's limited clinics with only 9 pts' allowed to be scheduled., can I schedule this pt. With another provider since pt. So uncomfortable and wants to be seen sooner for her problems...  Just wanted to double check, since you and Dr. Adam are working together today.  Please advise.  Thanks

## 2024-09-18 NOTE — TELEPHONE ENCOUNTER
Called and spoke with OBGYN, nurse Anabel. Anabel stated she will send a message to the provider about adding pt on sooner. Stated she will call the daughter to coordinate the apt.     Attempted to call pt's daughter Thor to alert of the message above. VM not set up, unable to leave a voicemail.     ROSETTA Peterson

## 2024-09-18 NOTE — TELEPHONE ENCOUNTER
Called and spoke with pt's daughter Thor, per HIPAA.  Appointment. made for pt. for Monday 9/23/24@ 330 pm with Dr. Salazar in [place] office...  Asked that they arrive 20 min. Prior to appointment. To fill out paperwork.And bring picture ID and insurance card.with.  Thor agrees to appointment. Time and verbalizes understanding of information..will send MCM with appointment. Information and office address.

## 2024-09-18 NOTE — TELEPHONE ENCOUNTER
Pls call [facility] and see if patient can be seen ASAP, otherwise if she is having pain/discomfort to go to ED.

## 2024-09-20 NOTE — TELEPHONE ENCOUNTER
Called patients daughter to verify OBGYN reached out, VM box not set up.   TE 9/18 from OB/GYN indicated they spoke with patients daughter and set up appointment for 9/23    Future Appointments   Date Time Provider Department Center   9/23/2024  3:30 PM Mickey Salazar MD [facility] [place]   10/10/2024 12:00 PM Ruth Adam DO [facility] [place]

## 2024-09-23 ENCOUNTER — OFFICE VISIT (OUTPATIENT)
Dept: OBGYN CLINIC | Facility: CLINIC | Age: 74
End: 2024-09-23
Payer: MEDICAID

## 2024-09-23 VITALS
WEIGHT: 128.25 LBS | HEIGHT: 62 IN | BODY MASS INDEX: 23.6 KG/M2 | SYSTOLIC BLOOD PRESSURE: 132 MMHG | HEART RATE: 69 BPM | DIASTOLIC BLOOD PRESSURE: 74 MMHG

## 2024-09-23 DIAGNOSIS — N81.4 UTERINE PROLAPSE: ICD-10-CM

## 2024-09-23 DIAGNOSIS — N95.0 POSTMENOPAUSAL BLEEDING: Primary | ICD-10-CM

## 2024-09-23 PROCEDURE — 88305 TISSUE EXAM BY PATHOLOGIST: CPT | Performed by: STUDENT IN AN ORGANIZED HEALTH CARE EDUCATION/TRAINING PROGRAM

## 2024-09-23 NOTE — PROCEDURES
Endometrial Biopsy Procedure Note  Date: 09/23/2024    Diagnosis: postmenopausal bleeding    Pt presented for postmenopausal bleeding and was explained the need for endometrial biopsy. The procedure was explained and all questions were answered. No pregnancy test was performed due to her menopausal status. Bimanual exam was done and showed a prolapsed uterus with cervix extending >2cm past introitus. Cervix was visualized. Cervix was cleaned with betadine. Single toothed tenaculum was placed on the anterior lip of the cervix for traction. Lidocaine jelly applied prior to tenaculum use. Pipelle was gently inserted into the cervical os and advanced to the uterine fundus. Uterine length was 10cm. Suction was used to removed a small amount of tissue. Biopsy was done of all 4 quadrants. Tenaculum was then removed and sites were noted to be hemostatic. Pt had slow oozing from os. Speculum was removed.    Pt tolerated the procedure well with minimal cramping. Precautions were reviewed. Will call with results of biopsy.    Ruth Adam DO   Obstetrics & Gynecology

## 2024-09-23 NOTE — PROGRESS NOTES
GYN PROBLEM VISIT    Subjective:   This is a 74 year old  presenting for GYN visit with daughter after 2yrs of being in [place].     States she has been having vaginal bleeding on and off for the past couple months. However, has been more consistent for the past month. Notices it when she wipes and sometimes on her underwear.     She also has uterine prolapse for which she was fitted with a pessary in 2018 but self-discontinued use due to difficulty with maintenance. Had seen urogynecologist while here several years ago.     FINDINGS:                UTERUS:  6.9 x 2.0 x 3.7 cm    Endometrium Thickness:  8 mm and is homogeneous.    The uterus appears normal in size, shape, and echogenicity.  RIGHT OVARY:  Not seen due to overlying bowel gas.  No mass lesion or fluid collection.    LEFT OVARY:  1.54 cm x 0.75 cm x 1.74 cm    The left ovary appears normal in size, shape, and echogenicity. No significant masses are identified.  CUL-DE-SAC:  Normal.  No fluid or mass.    OTHER:  Negative.                     Impression   CONCLUSION:  There is mild thickening of the endometrium to 8 mm which can be seen with atypia or hyperplasia or neoplastic disease.  Further evaluation with gynecology is recommended.          Last pap: 2024 NILM and HPV neg  Hx of abnormal pap smears: no  Postmenopausal     Review of Systems   Constitutional: Negative.    HENT: Negative.    Respiratory: Negative.    Gastrointestinal: Negative.    Endocrine: Negative.    Genitourinary: Negative.    Musculoskeletal: Negative.    Skin: Negative.    Allergic/Immunologic: Negative.    Neurological: Negative.      Past Medical History:    Cataract    Type II or unspecified type diabetes mellitus without mention of complication, not stated as uncontrolled    Unspecified essential hypertension       Past Surgical History:   Procedure Laterality Date    Cataract      Cholecystectomy         No Known Allergies     insulin glargine 100 UNIT/ML  Subcutaneous Solution Inject 10 Units into the skin every morning.      Empagliflozin-linaGLIPtin (GLYXAMBI) 25-5 MG Oral Tab Take 1 tablet by mouth daily.      carvedilol 12.5 MG Oral Tab Take 1 tablet (12.5 mg total) by mouth at bedtime.      Glucose Blood (ONETOUCH ULTRA) In Vitro Strip USE TO CHECK GLUCOSE ONCE DAILY 100 each 2    Lancets (ONETOUCH DELICA PLUS [id number]) Does not apply Misc 1 strip by In Vitro route daily. 100 each 2         Objective:    Physical Exam     Vitals:    24 1526   BP: 132/74   Pulse: 69        Constitutional: She is oriented to person, place, and time. She appears well-developed and well-nourished.   Cardiovascular: Normal rate, regular rhythm and normal heart sounds. Exam reveals no gallop and no friction rub. No murmur heard.  Pulmonary/Chest: Effort normal and breath sounds normal. She has no wheezes.   Abdominal: Soft. Bowel sounds are normal. She exhibits no distension and no mass. There is no tenderness. No hernia.  Genitourinary: apical prolapse with cervix extending >2cm past introitus, further extension with Valsalva; multiparous external os, cervical mucosa at 12 o'clock appears friable with areas of previous abrasion due to exposure; no active bleeding, no foul smelling discharge; large cystocele appreciated when cervix pushed back to apex  Neurological: She is alert and oriented to person, place, and time.     Assessment/Plan:  This is a 74 year old  presenting with postmenopausal bleeding and uterine prolapse.    #Postmenopausal bleeding  - Pt reports PMB for past several months  - TVUS notable for 8mm ES  - EMB collected today; will call or message pt with results  - All pt questions answered and pt reported good understanding    #Uterine Prolapse  - Pt with chronic but worsening h/o uterine prolapse, now states uterus is completely outside vagina  - Was fitted with pessary in 2018 but self-discontinued due to maintenance issues  - Pt advised to schedule  appt with urogynecology for further workup pending EMB results    Ruth Adam, DO  [facility] - OBGYN

## 2024-09-23 NOTE — PROGRESS NOTES
GYN PROBLEM VISIT    Subjective:   This is a 74 year old  presenting for GYN visit with daughter after 2yrs of being in [place].     States she has been having vaginal bleeding on and off for the past couple months. However, has been more consistent for the past month. Notices it when she wipes and sometimes on her underwear.     She also has uterine prolapse for which she was fitted with a pessary in 2018 but self-discontinued use due to difficulty with maintenance. Had seen urogynecologist while here several years ago.     FINDINGS:                UTERUS:  6.9 x 2.0 x 3.7 cm    Endometrium Thickness:  8 mm and is homogeneous.    The uterus appears normal in size, shape, and echogenicity.  RIGHT OVARY:  Not seen due to overlying bowel gas.  No mass lesion or fluid collection.    LEFT OVARY:  1.54 cm x 0.75 cm x 1.74 cm    The left ovary appears normal in size, shape, and echogenicity. No significant masses are identified.  CUL-DE-SAC:  Normal.  No fluid or mass.    OTHER:  Negative.                     Impression   CONCLUSION:  There is mild thickening of the endometrium to 8 mm which can be seen with atypia or hyperplasia or neoplastic disease.  Further evaluation with gynecology is recommended.          Last pap: 2024 NILM and HPV neg  Hx of abnormal pap smears: no  Postmenopausal     Review of Systems   Constitutional: Negative.    HENT: Negative.    Respiratory: Negative.    Gastrointestinal: Negative.    Endocrine: Negative.    Genitourinary: Negative.    Musculoskeletal: Negative.    Skin: Negative.    Allergic/Immunologic: Negative.    Neurological: Negative.      Past Medical History:    Cataract    Type II or unspecified type diabetes mellitus without mention of complication, not stated as uncontrolled    Unspecified essential hypertension       Past Surgical History:   Procedure Laterality Date    Cataract      Cholecystectomy         No Known Allergies     insulin glargine 100 UNIT/ML  Subcutaneous Solution Inject 10 Units into the skin every morning.      Empagliflozin-linaGLIPtin (GLYXAMBI) 25-5 MG Oral Tab Take 1 tablet by mouth daily.      carvedilol 12.5 MG Oral Tab Take 1 tablet (12.5 mg total) by mouth at bedtime.      Glucose Blood (ONETOUCH ULTRA) In Vitro Strip USE TO CHECK GLUCOSE ONCE DAILY 100 each 2    Lancets (ONETOUCH DELICA PLUS [id number]) Does not apply Misc 1 strip by In Vitro route daily. 100 each 2         Objective:    Physical Exam     Vitals:    24 1526   BP: 132/74   Pulse: 69        Constitutional: She is oriented to person, place, and time. She appears well-developed and well-nourished.   Cardiovascular: Normal rate, regular rhythm and normal heart sounds. Exam reveals no gallop and no friction rub. No murmur heard.  Pulmonary/Chest: Effort normal and breath sounds normal. She has no wheezes.   Abdominal: Soft. Bowel sounds are normal. She exhibits no distension and no mass. There is no tenderness. No hernia.  Genitourinary: apical prolapse with cervix extending >2cm past introitus, further extension with Valsalva; multiparous external os, cervical mucosa at 12 o'clock appears friable with areas of previous abrasion due to exposure; no active bleeding, no foul smelling discharge; large cystocele appreciated when cervix pushed back to apex  Neurological: She is alert and oriented to person, place, and time.     Assessment/Plan:  This is a 74 year old  presenting with postmenopausal bleeding and uterine prolapse.    #Postmenopausal bleeding  - Pt reports PMB for past several months  - TVUS notable for 8mm ES  - EMB collected today; will call or message pt with results  - All pt questions answered and pt reported good understanding    #Uterine Prolapse  - Pt with chronic but worsening h/o uterine prolapse, now states uterus is completely outside vagina  - Was fitted with pessary in 2018 but self-discontinued due to maintenance issues  - Pt advised to schedule  appt with urogynecology for further workup pending EMB results    Ruth Adam, DO  [facility] - OBGYN

## 2024-09-25 NOTE — TELEPHONE ENCOUNTER
Discharge order received. AVS and discharge instructions provided to patient including medications and follow-up appointment; all questions answered. PIV discontinued. Patient discharged with [facility] medications and all belongings by home wheelchair with caregiver. Patient refused staff transport.   Pt's daughter following up on refill.  Pt scheduled appt    Future Appointments   Date Time Provider Jaison Borjas   12/10/2021  9:40 AM Buffy Marcelino,  [facility]

## 2024-11-07 ENCOUNTER — TELEPHONE (OUTPATIENT)
Dept: UROLOGY | Facility: CLINIC | Age: 74
End: 2024-11-07

## 2024-11-07 NOTE — TELEPHONE ENCOUNTER
Spoke with patient's daughter, patient's listed mobile number and emergency contact.  Patient's daughter confirmed patient's NP appointment date, time, expressed understanding of NP instructions, and was provided with clinic address.

## 2024-11-13 ENCOUNTER — OFFICE VISIT (OUTPATIENT)
Dept: UROLOGY | Facility: CLINIC | Age: 74
End: 2024-11-13
Attending: OBSTETRICS & GYNECOLOGY
Payer: MEDICAID

## 2024-11-13 VITALS — WEIGHT: 132 LBS | TEMPERATURE: 97 F | BODY MASS INDEX: 24.29 KG/M2 | HEIGHT: 62 IN

## 2024-11-13 DIAGNOSIS — R35.1 NOCTURIA: ICD-10-CM

## 2024-11-13 DIAGNOSIS — N39.3 FEMALE STRESS INCONTINENCE: ICD-10-CM

## 2024-11-13 DIAGNOSIS — N81.11 CYSTOCELE, MIDLINE: ICD-10-CM

## 2024-11-13 DIAGNOSIS — N81.3 COMPLETE UTEROVAGINAL PROLAPSE: Primary | ICD-10-CM

## 2024-11-13 DIAGNOSIS — N81.6 RECTOCELE: ICD-10-CM

## 2024-11-13 DIAGNOSIS — N95.2 VAGINAL ATROPHY: ICD-10-CM

## 2024-11-13 DIAGNOSIS — N39.41 URGE INCONTINENCE: ICD-10-CM

## 2024-11-13 LAB
BLOOD URINE: NEGATIVE
CONTROL RUN WITHIN 24 HOURS?: YES
LEUKOCYTE ESTERASE URINE: NEGATIVE
NITRITE URINE: NEGATIVE

## 2024-11-13 PROCEDURE — 81002 URINALYSIS NONAUTO W/O SCOPE: CPT | Performed by: OBSTETRICS & GYNECOLOGY

## 2024-11-13 PROCEDURE — 87086 URINE CULTURE/COLONY COUNT: CPT | Performed by: OBSTETRICS & GYNECOLOGY

## 2024-11-13 PROCEDURE — 57160 INSERT PESSARY/OTHER DEVICE: CPT | Performed by: OBSTETRICS & GYNECOLOGY

## 2024-11-13 PROCEDURE — 99212 OFFICE O/P EST SF 10 MIN: CPT

## 2024-11-13 RX ORDER — ASPIRIN 81 MG/1
TABLET, CHEWABLE ORAL DAILY
COMMUNITY

## 2024-11-13 RX ORDER — VALSARTAN AND HYDROCHLOROTHIAZIDE 160; 12.5 MG/1; MG/1
1 TABLET, FILM COATED ORAL DAILY
COMMUNITY

## 2024-11-13 NOTE — PROCEDURES
Patient was unable to void in the privacy of the bathroom. Dr Gallagher performed straight cath. PVR 80, urine dipstick and sent for culture.   Dr Gallagher placed #5 shortstem Gellhorn. Patient up to bathroom. Pessary was comfortable, supportive and retained.

## 2024-11-13 NOTE — PROGRESS NOTES
ID: Tuyet Maciel  : 1950  Date: 2024     Referred by Dr. Kenia Peterson DO    Chief Complaint   Patient presents with    Prolapse     Referred by PCP Dr Peterson  Previous Dr Nichole patient, last seen 3/22/18         HPI:  74 year old Urdo speaking female, G3,  Vaginal deliveries x3, who presents for evaluation of vaginal prolapse.  Saw Dr. Kennedy in 2018 for similar symptoms. Dx with stage 2 POP. Fitted with pessary (#4 ring with support). Did self care but only able to use for 3 months or so.   Bothered by bulge. It is out of the vagina.  Also reports worsening FAY and UUI.  Nocturia is x 3  Denies history of UTIs.  No recent urines on file.  Bowels regular.   Has been having PMB.  Pelvic US showed 8 mm endometrial stripe. Saw Gyn on 24 and had EMB negative for hyperplasia or malignancy. Continues to report daily spotting.   Not sexually active at this time.  Here with her daughter who is helping translate.     PMHx: DM (uses insulin, last A1C 6.5 on 24), HTN.    Urogynecology Summary:  Urogynecology Summary  Prolapse: Yes  FAY: Yes  Urge Incontinence: Yes  Nocturia Frequency: 3  Frequency: Greater than 3 hours  Incomplete emptying: No  Constipation: No  Wears pad day?: 3  Wears Pad Night?: 2  Activities are limited by UI/POP?: Yes  Currently Sexually Active: No          HISTORY:  Past Medical History:    Cataract    Type II or unspecified type diabetes mellitus without mention of complication, not stated as uncontrolled    Unspecified essential hypertension      Past Surgical History:   Procedure Laterality Date    Cataract      Cholecystectomy        Family History   Problem Relation Age of Onset    Breast Cancer Mother 70        approx     Breast Cancer Sister 60        approx age    Breast Cancer Maternal Aunt 65        approx age       Social History     Socioeconomic History    Marital status:    Tobacco Use    Smoking status: Never     Passive exposure: Never    Smokeless  tobacco: Never   Vaping Use    Vaping status: Never Used   Substance and Sexual Activity    Alcohol use: No    Drug use: No        Allergies:  Allergies[1]    Medications:  Medications Prior to Visit[2]    Review of Systems:    A comprehensive 12 point review of systems was completed.  Pertinent positives noted in the the HPI.  Denies CP  Denies SOB    Vitals:  Temp 97.4 °F (36.3 °C)   Ht 62\"   Wt 132 lb (59.9 kg)   BMI 24.14 kg/m²        GENERAL EXAM:  GENERAL:  Alert and oriented. Well-nourished, normally developed.  Thought and emotional status are appropriate, speech is understandable.  No acute distress.   HEAD: Normocephalic and atraumatic with normal hair distribution  LUNGS:  Normal respiratory effort.    ABDOMEN: Non tender to palpation, tone normal without rigidity or guarding, no masses present, no evidence of hernia.   EXTREMITIES:  Without edema, varicosities or lesions.   SKIN:  Warm and dry, with good color and turgor. No lesions.    PELVIC EXAM:  External Genitalia: Normal appearance for age. + atrophy, no lesions  Urethra: + atrophy, non tender  Bladder:no fullness, non tender  Vagina: + atrophy, no lesions. There is prolapse at rest with cervix at + 7 cm.   Cervix: friable, no lesions.   Uterus: soft, mobile, non tender  Adnexa:unable to palpate.   Perineum: non tender  Anus: no hemorrhoids  Rectum: deferred.     PELVIC SUPPORT:  [place]:  4  Ant:  4  Post:  2  CST:  negative  UVJ: + hypermobile    The patient was unable to void in the privacy of the bathroom. She was catheterized for PVR of 80 ml.  Urine dip + glucose.  Specimen sent for C&S.    Following exam, the patient agreed to be fitted with pessary.  A #5 short stem Gellhorn was placed with good results. Tolerated well.     Impression/Plan:    ICD-10-CM    1. Complete uterovaginal prolapse  N81.3 Pessary Fitting or Insertion      2. Cystocele, midline  N81.11 Pessary Fitting or Insertion      3. Rectocele  N81.6 Pessary Fitting or Insertion       4. Female stress incontinence  N39.3 POCT urinalysis dipstick[[id number]]     Urine Culture, Routine     Straight Cath PVR      5. Nocturia  R35.1 POCT urinalysis dipstick[[id number]]     Urine Culture, Routine     Straight Cath PVR      6. Urge incontinence  N39.41 POCT urinalysis dipstick[[id number]]     Urine Culture, Routine     Straight Cath PVR      7. Vaginal atrophy  N95.2           Discussion Items:   Behavioral and pharmacologic treatments for OAB  Nonsurgical and surgical treatments for Stress Urinary Incontinence  Nonsurgical and surgical treatments for POP  Discussed dietary and behavioral modification, discussed pharmacologic and nonpharmacologic mgmt options for urinary symptoms. Discussed dietary & weight management with potential improvements in symptoms with weight loss.    Diagnostic Items:  Urodynamics  Urine C&S    Medications Discussed:  None    Treatment Plan, Non-surgical:   Wants surgery but fitted with #5 short stem Gellhorn in the meantime.     Treatment Plan, Surgical:   Wants definitive therapy.  She can be approached vaginally with hysterectomy and repairs.  Agrees to proceed with UDS.     Pt and daughter verbalize understanding of all above discussed information. Follow up after testing.   information provided.     Heather Gallagher MD, FACOG, FACS  Female Pelvic Medicine and  Reconstructive Surgery (Urogynecology)           [1] No Known Allergies  [2]   Outpatient Medications Prior to Visit   Medication Sig Dispense Refill    valsartan-hydroCHLOROthiazide 160-12.5 MG Oral Tab Take 1 tablet by mouth daily.      aspirin 81 MG Oral Chew Tab Chew by mouth daily.      insulin glargine 100 UNIT/ML Subcutaneous Solution Inject 10 Units into the skin every morning.      Empagliflozin-linaGLIPtin (GLYXAMBI) 25-5 MG Oral Tab Take 1 tablet by mouth daily.      carvedilol 12.5 MG Oral Tab Take 1 tablet (12.5 mg total) by mouth at bedtime.      amLODIPine 10 MG TABS 10 mg, losartan 100 MG TABS 100  mg Take by mouth daily.      Glucose Blood (ONETOUCH ULTRA) In Vitro Strip USE TO CHECK GLUCOSE ONCE DAILY 100 each 2    Lancets (ONETOUCH DELICA PLUS [id number]) Does not apply Misc 1 strip by In Vitro route daily. 100 each 2     No facility-administered medications prior to visit.

## 2024-12-23 ENCOUNTER — TELEPHONE (OUTPATIENT)
Dept: UROLOGY | Facility: CLINIC | Age: 74
End: 2024-12-23

## 2024-12-23 NOTE — TELEPHONE ENCOUNTER
Spoke with dtrThor, she will be bringing her mother to UDS on 12/27/24 @ 8:30  Reviewed UDS instructions and appt confirmed  Patient using # 5 SS gelhorn pessary -  REBEKA garcia to leave pessary in place for Uroflow

## 2024-12-27 ENCOUNTER — OFFICE VISIT (OUTPATIENT)
Dept: UROLOGY | Facility: CLINIC | Age: 74
End: 2024-12-27
Attending: OBSTETRICS & GYNECOLOGY
Payer: MEDICAID

## 2024-12-27 VITALS — RESPIRATION RATE: 16 BRPM | BODY MASS INDEX: 24 KG/M2 | WEIGHT: 132 LBS

## 2024-12-27 DIAGNOSIS — N39.41 URGE INCONTINENCE: ICD-10-CM

## 2024-12-27 DIAGNOSIS — N39.3 FEMALE STRESS INCONTINENCE: ICD-10-CM

## 2024-12-27 DIAGNOSIS — N81.6 RECTOCELE: ICD-10-CM

## 2024-12-27 DIAGNOSIS — N81.11 CYSTOCELE, MIDLINE: ICD-10-CM

## 2024-12-27 DIAGNOSIS — N81.3 COMPLETE UTEROVAGINAL PROLAPSE: Primary | ICD-10-CM

## 2024-12-27 PROCEDURE — 51797 INTRAABDOMINAL PRESSURE TEST: CPT

## 2024-12-27 PROCEDURE — 51729 CYSTOMETROGRAM W/VP&UP: CPT

## 2024-12-27 PROCEDURE — 81002 URINALYSIS NONAUTO W/O SCOPE: CPT

## 2024-12-27 PROCEDURE — 51741 ELECTRO-UROFLOWMETRY FIRST: CPT

## 2024-12-27 PROCEDURE — 51784 ANAL/URINARY MUSCLE STUDY: CPT

## 2024-12-27 NOTE — PROCEDURES
Patient here for urodynamic testing. Daughter, Thor is present and interpreting English to Urdo per patient permission - Both decline language line for today's visit Procedure explained and confirmed by patient.  See evaluation form for results.  Both verbal and written discharge instructions were given.  Patient tolerated procedure well and will follow up with Dr. Heather Gallagher on 25.    URODYNAMIC EVALUATION    PATIENT HISTORY:    Prolapse:  Yes - # 5 SS gelhorn in place since 24  ;patient reports good support with pessary - Denies vaginal bleeding, discharge,  FAY:  Yes  UUI:  Yes  Nocturia:  2 to 3  Frequency:  every 1-2 hours  Sense of Incomplete Emptying:  No  Constipation:  No  Last void prior to UDS testin minites  Current urge to void?  Moderate  OAB meds stopped prior to test?  NA  Other symptoms? Is unsure about surgery - happy with pessary     Surgery?  [x]  No  []  Interested in surgery:   []  Yes, specify date:    Surgical folder provided?  []  Yes  [x]  No     PATIENT DIAGNOSIS:  Cystocele, Midline N81.11, Rectocele, Midline R15.9, Urge Incontinence N39.41, Stress Incontinence N39.3, and Uterovaginal Prolapse N81.3 (complete)    UDS PROCEDURAL FINDINGS:  Urge Incontinence N39.41, Stress Incontinence N39.3, Incomplete Bladder Emptying R39.14, and Detrusor Instability N31.9    MEDICATION: Medications Taking[1]     ALLERGIES:  Patient has no known allergies.      EXAM:  Urinalysis Dip:     [place] ( >30 degrees ):  [x]  Mobile  []  Fixed    Perineal Sensation:  [x]  Normal  []  Abnormal    Additional Notes:    PROLAPSE:  [x]  Yes  []  No  Prolapse reduced for testing?  [x]  Yes  []  No  [x]  Pessary # 5 SS gelhorn placed 24 per Elliott Ramos  []  Manual  []  Half Speculum    Additional Notes:    UROFLOWMETRY:  Reduced  Voided Volume:                     90        mL  Maximum Flow Rate:                          5      mL/sec  Average flow rate:                         3     mL/sec  Post-void Residual:                       80    mL  Pattern:  []  Normal  [x]  Poor flow     []  Intermittent  []  Other  Void:   []  Typical  [x]  Atypical    Additional Notes:     CYSTOMETRY:  Urethral Catheter:  Fr 7 / tdoc  Abd Catheter:     Fr 7 / tdoc   Infusion:  Water Rate 30 mL/min  Temp:  Room  Position:  [x]  Sit  []  Stand  []  Supine  First sensation:   100 mL  First desire to void:   154 mL  Strong desire to void:  297 mL  Maximum cystometric capacity:    306 mL  Detrusor Activity:  [x]  Unstable    []  Stable  Urge leakage?    [x]  Yes @ capacity []  No  Volume at 1st unhibited detrusor cont:   154 mL  Detrusor instability provoked by:    []  Spontaneous []  Coughing  [x]  Filling  []  Valsalva  []  Other    Additional Notes:   DO noted at 154 ml - this did subside with fluid slowing  until capacity at 306 ml - allowed to empty    URETHRAL FUNCTION:  Valsava (vesical) Leak Point Pressures:    REDUCED:  Volume Leak Point Pressure Leak?    Cough Valsalva      100mL 97 58    cm H2O [x]  Yes []  No   200mL 79 89    cm H2O [x]  Yes []  No       Genuine Stress Incontinence demonstrated?   [x]  Yes @ 100 ml reduced with cough  []  No    Resting Urethral Pressure Profile:     Functional Urethral Length:manual pull   Maximum UCP:          64 cm     55        cm      PRESSURE/FLOW STUDY:  Reduced  Voided volume:   300 mL  Maximum flow rate:       12 mL/sec  Pressure Detrusor (at maximum flow):        22    cm H2O  Post void residual:      175         mL  Voiding mechanism:  []  Abnormal  []  Normal  []  Strain to void   []  Weak detrusor  Void:   [x]  Typical   []  Atypical    Additional Notes: Reports feeling unsure if she emptied completely   Uroflowmetry, cystometry, and pressure / flow study were completed using calibrated electronic equipment and air charged transducers.    [facility]:  During fill: Reactive    During flow study: Reactive    12/27/2024 8:52 AM     PERFORMED BY:  Ami GUALLPA RN                  [1]   Outpatient Medications Marked as Taking for the 12/27/24 encounter (Office Visit) with [facility] PROCEDURE   Medication Sig Dispense Refill    valsartan-hydroCHLOROthiazide 160-12.5 MG Oral Tab Take 1 tablet by mouth daily.      aspirin 81 MG Oral Chew Tab Chew by mouth daily.      insulin glargine 100 UNIT/ML Subcutaneous Solution Inject 10 Units into the skin every morning.      Empagliflozin-linaGLIPtin (GLYXAMBI) 25-5 MG Oral Tab Take 1 tablet by mouth daily.      carvedilol 12.5 MG Oral Tab Take 1 tablet (12.5 mg total) by mouth at bedtime.      amLODIPine 10 MG TABS 10 mg, losartan 100 MG TABS 100 mg Take by mouth daily.

## 2025-01-08 ENCOUNTER — TELEPHONE (OUTPATIENT)
Dept: UROLOGY | Facility: CLINIC | Age: 75
End: 2025-01-08

## 2025-01-08 ENCOUNTER — OFFICE VISIT (OUTPATIENT)
Dept: UROLOGY | Facility: CLINIC | Age: 75
End: 2025-01-08
Attending: OBSTETRICS & GYNECOLOGY
Payer: MEDICAID

## 2025-01-08 VITALS
HEIGHT: 62 IN | SYSTOLIC BLOOD PRESSURE: 128 MMHG | BODY MASS INDEX: 24 KG/M2 | DIASTOLIC BLOOD PRESSURE: 60 MMHG | TEMPERATURE: 98 F

## 2025-01-08 DIAGNOSIS — N81.11 CYSTOCELE, MIDLINE: ICD-10-CM

## 2025-01-08 DIAGNOSIS — N81.2 INCOMPLETE UTEROVAGINAL PROLAPSE: Primary | ICD-10-CM

## 2025-01-08 DIAGNOSIS — N39.41 URGE INCONTINENCE: ICD-10-CM

## 2025-01-08 DIAGNOSIS — N39.3 FEMALE STRESS INCONTINENCE: ICD-10-CM

## 2025-01-08 DIAGNOSIS — N81.6 RECTOCELE: ICD-10-CM

## 2025-01-08 PROCEDURE — 99212 OFFICE O/P EST SF 10 MIN: CPT

## 2025-01-08 RX ORDER — VIBEGRON 75 MG/1
1 TABLET, FILM COATED ORAL DAILY
Qty: 30 TABLET | Refills: 3 | Status: SHIPPED | OUTPATIENT
Start: 2025-01-08 | End: 2025-02-07

## 2025-01-08 NOTE — PROGRESS NOTES
ID: Tuyet Maciel  : 1950  Date: 25    Chief Complaint   Patient presents with    Pessary Maintenance     #5 SSgelhorn placed 24    UDS Follow-up       HPI:  74 year old Urdo speaking female, G3,  Vaginal deliveries x3, who was originally seen on 24. She presented for evaluation of vaginal prolapse.  Saw Dr. Kennedy in 2018 for similar symptoms. Dx with stage 2 POP. Fitted with pessary (#4 ring with support). Did self care but only able to use for 3 months or so.   Bothered by bulge. It is out of the vagina.  Also reports worsening FAY and UUI.  Nocturia is x 3  Denies history of UTIs.  No recent urines on file.  Bowels regular.   Has been having PMB.  Pelvic US showed 8 mm endometrial stripe. Saw Gyn on 24 and had EMB negative for hyperplasia or malignancy. Continues to report daily spotting.   Not sexually active at this time.  Here with her daughter who is helping translate.     PMHx: DM (uses insulin, last A1C 6.5 on 24), HTN.    Initial urogyn exam demonstrated stage 4 POP with cervix at + 7 cm.  Agreed to be fitted with # 5 short stem Gellhorn. Interested in surgery. Returns after UDS.     Interval history:  Has been using #5 short stem Gellhorn without problems.  Feels pessary is supportive.  Continues to report FAY and UUI.  Feels she is emptying well. Here with her daughter.      Urodynamic testing undergone without complication on 24.  Results reviewed with patient  90 ml void (80 ml PVR)  306 ml capacity  + Unstable detrusor at 154 ml, resulting in urgency and leaking  + FAY with cough and Valsalva at 100 mL with prolapse reduced. VLLP 74 cm of water.   Pressure/flow study: Voided 300 ml with PVR of 175 ml.     Urogynecology Summary:  Urogynecology Summary  Prolapse: No  FAY: Yes  Urge Incontinence: Yes  Nocturia Frequency: 3  Frequency: 2 - 3 hours  Incomplete emptying: No  Constipation: No  Wears pad day?: 4 (3-4 light)  Wears Pad Night?: 2 (light)  Activities  are limited by UI/POP?: No  Currently Sexually Active: No      Review of Systems:    A comprehensive 12 point review of systems was completed.  Pertinent positives noted in the the HPI.  Denies CP  Denies SOB    Vitals:  /60   Temp 97.7 °F (36.5 °C)   Ht 62\"   BMI 24.14 kg/m²        GENERAL EXAM:  GENERAL:  Alert and oriented. Well-nourished, normally developed.  Thought and emotional status are appropriate, speech is understandable.  No acute distress.   HEAD: Normocephalic and atraumatic with normal hair distribution  LUNGS:  Normal respiratory effort.    ABDOMEN: Non tender to palpation, tone normal without rigidity or guarding, no masses present, no evidence of hernia.   EXTREMITIES:  Without edema, varicosities or lesions.   SKIN:  Warm and dry, with good color and turgor. No lesions.    PELVIC EXAM:  External Genitalia: Normal appearance for age. + atrophy, no lesions  Urethra: + atrophy, non tender. + CST with prolapse reduced.   Bladder:no fullness, non tender  Vagina: + atrophy, no lesions. Prolapse supported. Pessary removed. Small amount of granulation tissue treated with silver nitrate. Pessary replaced. Tolerated well.   Cervix: no lesions.   Uterus: soft, mobile, non tender  Adnexa:unable to palpate.   Perineum: non tender  Anus: no hemorrhoids  Rectum: deferred.     Impression:    ICD-10-CM    1. Incomplete uterovaginal prolapse  N81.2       2. Cystocele, midline  N81.11       3. Rectocele  N81.6       4. Female stress incontinence  N39.3       5. Urge incontinence  N39.41 Vibegron (GEMTESA) 75 MG Oral Tab            Plan:  Discussed with patient management options for her symptoms. She is happy with pessary for now, but still reports FAY and UUI. Agrees to continue pessary and have trial of Gemtesa 75 mg PO daily. Understands that pessary would involve visits every 2-3 months.  She may continue to experience FAY, unless she wants to try incontinence pessary.      I explained to patient and  her daughter was surgery would entail: TVH, possible BS, USLS, APE repairs, sling, cysto.     Thorough discussion of surgical risks, benefits, and alternatives including, but not limited to bleeding/clots, infection, injury to nearby organs (urethra, bladder, ureters, bowel, blood vessels), mesh erosion/exposure, dyspareunia, de persistent UUI that may require ongoing treatment, recurrent FAY, prolapse recurrence, voiding dysfunction, and pain. Possible need for additional surgeries to address any complications reviewed. Discussed pain mgmt and potential need for narcotics. Discussed addiction potential with narcotics. [place]  reviewed.  We reviewed hospital stay and postoperative convalescence.  She understands she will need to go home with a catheter.    All questions answered. She will think about her options and let us know. Recommend follow up in 6 weeks for pessary check with PA.       Dr. Heather Gallagher MD, FACOG, FACS  Female Pelvic Medicine and  Reconstructive Surgery (Urogynecology)

## 2025-01-08 NOTE — TELEPHONE ENCOUNTER
Prior authorization submitted for Gemtesa. Pt was provided with samples at today's visit by Dr Gallagher. Submitted via [employer]. Key: [id number]

## 2025-01-09 NOTE — TELEPHONE ENCOUNTER
Prior authorization approval received for Gemtesa with coverage until 1/6/2026. Faxed approval sent to scanning.

## 2025-02-19 ENCOUNTER — OFFICE VISIT (OUTPATIENT)
Dept: UROLOGY | Facility: CLINIC | Age: 75
End: 2025-02-19
Attending: OBSTETRICS & GYNECOLOGY
Payer: MEDICAID

## 2025-02-19 VITALS — BODY MASS INDEX: 24 KG/M2 | HEIGHT: 62 IN | RESPIRATION RATE: 16 BRPM | TEMPERATURE: 97 F

## 2025-02-19 DIAGNOSIS — R33.9 INCOMPLETE BLADDER EMPTYING: ICD-10-CM

## 2025-02-19 DIAGNOSIS — N39.41 URGE INCONTINENCE: ICD-10-CM

## 2025-02-19 DIAGNOSIS — N81.84 PELVIC MUSCLE WASTING: ICD-10-CM

## 2025-02-19 DIAGNOSIS — N81.3 COMPLETE UTEROVAGINAL PROLAPSE: Primary | ICD-10-CM

## 2025-02-19 DIAGNOSIS — N95.2 VAGINAL ATROPHY: ICD-10-CM

## 2025-02-19 PROCEDURE — 99212 OFFICE O/P EST SF 10 MIN: CPT

## 2025-02-19 RX ORDER — VIBEGRON 75 MG/1
1 TABLET, FILM COATED ORAL DAILY
Qty: 90 TABLET | Refills: 3 | Status: SHIPPED | OUTPATIENT
Start: 2025-02-19

## 2025-02-19 RX ORDER — VIBEGRON 75 MG/1
TABLET, FILM COATED ORAL
COMMUNITY

## 2025-02-19 RX ORDER — ESTRADIOL 0.1 MG/G
CREAM VAGINAL
Qty: 42.5 G | Refills: 3 | Status: SHIPPED | OUTPATIENT
Start: 2025-02-19

## 2025-02-19 NOTE — PROGRESS NOTES
"Subjective:      Keshav Hwang is a 20 y.o. male who presents with Head Injury (WC FV, feeling fine)      DOI: 12/5/2019  Pt returns to clinic stating \"feeling fine\"-would like to close case MMI at this point.  Patient denies dizziness headache or visual changes.  Denies nausea vomiting.  Denies numbness tingling or weakness.  Notes complete resolution of issues/symptoms.  Wound at head has healed well thus far.  Patient reports mild impact to head.  Feels \"completely normal and would like case closed\".     Head Injury          ROS       Objective:     /60 (BP Location: Left arm, Patient Position: Sitting, BP Cuff Size: Adult)   Pulse 100   Temp 36.9 °C (98.4 °F) (Temporal)   Resp 16   SpO2 99%      Physical Exam    Gen: AOx3; Head: NC scabbed well healed lesion over occiput, no extension of erythema, no edema, no ecchymosis; Eyes: PERRLA/EOM; Lungs: NLR; Cardiac: RR by periph pulse exam; Neuro: Normal neurologic exam, cranial nerves normal, normal nonantalgic gait, normal strength upper and lower extremities       Assessment/Plan:       1. Closed head injury, subsequent encounter  MMI    2. Laceration of scalp, subsequent encounter      " Pt declined use of the hospital provided  line and instead preferred her daughter (Thor) to interpret    Pt presents for f/u of pelvic organ prolapse, pessary care  Using #5 SS gellhorn pessary, office care    Reports pessary is comfortable   Denies discharge  Denies bleeding  Denies s/sx of UTI  Bowels reg  Not on vag estrogen    Happy with pessary, feels supported  She is not currently interested in surgical management of her pelvic organ prolapse  -- considering later this year    Hx of PMB last year  Pelvic US 9/9/24 showed endometrial thickness 8 mm  9/23/24 and had EMB negative for hyperplasia or malignancy     UUI  Currently taking Gemtesa 75 mg  Denies SEs  Reports 80% improvement    UDS 12/27/24:  90/80 mL & 300/175 mL  retirement 306 mL  DO @ 154 mL  FAY @ 100 mL    Urogynecology Summary:  Urogynecology Summary  Prolapse: No  FAY: No  Urge Incontinence: No  Nocturia Frequency: 2  Frequency: Greater than 3 hours (every 3-4 hours)  Incomplete emptying: No  Constipation: No  Wears pad day?: 0  Wears Pad Night?: 0  Activities are limited by UI/POP?: No  Currently Sexually Active: No      Vitals:  Vitals:    02/19/25 1352   Resp: 16   Temp: 97.3 °F (36.3 °C)       GENERAL EXAM:  GENERAL: alert & oriented, NAD  HEENT: NC/AT, sclera without injection  SKIN: no rashes  LUNGS:  without increased respiratory effort  ABDOMEN: soft, non-tender, non-distended, no masses appreciated  EXT: no edema    PELVIC EXAM: PAUL Acuna, present for exam as a chaperone  External Genitalia: Normal appearance for age. + atrophy, no lesions  Urethra: + atrophy, non tender  Bladder:no fullness, non tender  Vagina: + atrophy, no lesions. There is prolapse at rest with cervix at + 7 cm.   Cervix: friable, no lesions, no bleeding  Uterus: soft, mobile, non tender  Adnexa: unable to palpate.   Perineum: non tender  Anus: no hemorrhoids  Rectum: deferred.      PELVIC SUPPORT:  [place]:  4  Ant:  4  Post:  2  CST:  negative  UVJ: +  hypermobile    Her pessary was removed, cleaned, and reinserted without difficulty.    Impression:    ICD-10-CM    1. Complete uterovaginal prolapse  N81.3 OP REFERRAL [facility]      2. Urge incontinence  N39.41 OP REFERRAL TO [facility]     Vibegron (GEMTESA) 75 MG Oral Tab      3. Vaginal atrophy  N95.2 estradiol (ESTRACE) 0.1 MG/GM Vaginal Cream      4. Pelvic muscle wasting  N81.84       5. Incomplete bladder emptying  R33.9 OP REFERRAL [facility]          Discussion Items:   Discussed mgmt of vulvovaginal atrophy with vaginal estrogen cream. Reviewed associated benefits, risks, alternatives, and goals. Recommend low dose 3x weekly mgmt.     Discussed management of pelvic organ prolapse including but not limited to behavioral modifications, conservative options, and surgical management.   Discussed pessary management including benefits and risks. Discussed importance of keeping regularly scheduled pessary checks in prevention of complications related to pessary use.     Discussed management of incomplete bladder emptying including PFPT, CISC, indwelling catheter.  Discussed risks of not treating this condition including but not limited to increased risk for UTI and kidney damage.    Treatment Plan:  Start PFPT to improve voids  Continue pessary management, office care  Start vag estrogen as prescribed  Continue Gemtesa 75 mg  -- if issues with cost/coverage rec mirabegron 50 mg with BP monitoring  Encouraged daily pelvic exercises  Cont bowel regimen  Call with s/sx of UTI, problems with pessary     All questions answered  Pt understands and agrees to plan       Return in about 8 weeks (around 4/16/2025) for pessary care.      Jamia Mendosa PA-C    Note to patient: The 21st Century Cures Act makes medical notes like these available to patients in the interest of transparency.  However, be advised this is a medical document.  It is intended as  peer to peer communication.  It is written in medical language and may contain abbreviations or verbiage that are unfamiliar.  It may appear blunt or direct.  Medical documents are intended to carry relevant information, facts as evident, and the clinical opinion of the practitioner.

## 2025-03-03 DIAGNOSIS — E11.40 TYPE 2 DIABETES MELLITUS WITH DIABETIC NEUROPATHY, WITH LONG-TERM CURRENT USE OF INSULIN (HCC): Primary | ICD-10-CM

## 2025-03-03 DIAGNOSIS — Z79.4 TYPE 2 DIABETES MELLITUS WITH DIABETIC NEUROPATHY, WITH LONG-TERM CURRENT USE OF INSULIN (HCC): Primary | ICD-10-CM

## 2025-03-06 NOTE — TELEPHONE ENCOUNTER
Please review; protocol failed/ has no protocol      Medication is listed as patient reported.  No active /future labs noted

## 2025-03-13 RX ORDER — INSULIN GLARGINE 100 [IU]/ML
10 INJECTION, SOLUTION SUBCUTANEOUS EVERY MORNING
Qty: 3 ML | Refills: 0 | Status: SHIPPED | OUTPATIENT
Start: 2025-03-13 | End: 2025-04-12

## 2025-03-13 NOTE — TELEPHONE ENCOUNTER
Received call from pt's daughter Thor. Looking for refill of pt's insulin. Upon chart review, listed as external/patient reported. Daughter obtain pen and name on pen is Myah Card DO which appears to be the pt's previous PCP. Wondering if Dr. Peterson will fill and if not, what do they do.     LOV 7/25/24   Dr. Peterson - Would you refill?

## 2025-03-13 NOTE — TELEPHONE ENCOUNTER
Called and spoke with pt's daughter. Notified 30 day supply sent in. Advised due for office visit and labs. Scheduled soonest opening with Dr. Peterson. Notified will ensure labs are entered to complete prior to visit. Verbalizes understanding and is agreeable to plan.     Future Appointments   Date Time Provider Department Center   4/3/2025  9:00 AM Kenia Peterson,  [facility]   4/16/2025  2:40 PM Jamia Mendosa PA [facility] None     Dr. Peterson - Pended Dm labs. Please sign if agreeable

## 2025-03-14 ENCOUNTER — TELEPHONE (OUTPATIENT)
Dept: FAMILY MEDICINE CLINIC | Facility: CLINIC | Age: 75
End: 2025-03-14

## 2025-03-14 RX ORDER — INSULIN GLARGINE 100 [IU]/ML
10 INJECTION, SOLUTION SUBCUTANEOUS NIGHTLY
Qty: 15 ML | Refills: 0 | Status: SHIPPED | OUTPATIENT
Start: 2025-03-14

## 2025-03-14 NOTE — TELEPHONE ENCOUNTER
KEY: [id number]  
Called pharmacy regarding Lantus Solo, goes through insurance, will get ready for patient.  
Lantus Solostar was prescribed  
Lantus and Lantus Solostar are preferred    
ADMIT

## 2025-03-14 NOTE — TELEPHONE ENCOUNTER
Dr Peterson: lantus solostar pen pended if agreeable     Received call from [facility] pharmacy with questions about lantus prescription.   Patient states she has been using Lantus pens and script sent over 3/13 is not the pen.

## 2025-04-15 ENCOUNTER — LAB ENCOUNTER (OUTPATIENT)
Dept: LAB | Age: 75
End: 2025-04-15
Attending: FAMILY MEDICINE
Payer: MEDICAID

## 2025-04-15 DIAGNOSIS — E55.9 VITAMIN D DEFICIENCY: ICD-10-CM

## 2025-04-15 DIAGNOSIS — Z79.4 TYPE 2 DIABETES MELLITUS WITH DIABETIC NEUROPATHY, WITH LONG-TERM CURRENT USE OF INSULIN (HCC): ICD-10-CM

## 2025-04-15 DIAGNOSIS — E11.40 TYPE 2 DIABETES MELLITUS WITH DIABETIC NEUROPATHY, WITH LONG-TERM CURRENT USE OF INSULIN (HCC): ICD-10-CM

## 2025-04-15 DIAGNOSIS — N95.0 POSTMENOPAUSAL BLEEDING: ICD-10-CM

## 2025-04-15 LAB
ALBUMIN SERPL-MCNC: 4.7 G/DL (ref 3.2–4.8)
ALBUMIN/GLOB SERPL: 1.2 {RATIO} (ref 1–2)
ALP LIVER SERPL-CCNC: 82 U/L (ref 55–142)
ALT SERPL-CCNC: 12 U/L (ref 10–49)
ANION GAP SERPL CALC-SCNC: 11 MMOL/L (ref 0–18)
AST SERPL-CCNC: 21 U/L (ref ?–34)
BILIRUB SERPL-MCNC: 0.6 MG/DL (ref 0.2–1.1)
BILIRUB UR QL STRIP.AUTO: NEGATIVE
BUN BLD-MCNC: 33 MG/DL (ref 9–23)
CALCIUM BLD-MCNC: 10.1 MG/DL (ref 8.7–10.6)
CHLORIDE SERPL-SCNC: 105 MMOL/L (ref 98–112)
CHOLEST SERPL-MCNC: 203 MG/DL (ref ?–200)
CO2 SERPL-SCNC: 27 MMOL/L (ref 21–32)
CREAT BLD-MCNC: 1.91 MG/DL (ref 0.55–1.02)
CREAT UR-SCNC: 72.4 MG/DL
EGFRCR SERPLBLD CKD-EPI 2021: 27 ML/MIN/1.73M2 (ref 60–?)
EST. AVERAGE GLUCOSE BLD GHB EST-MCNC: 151 MG/DL (ref 68–126)
FASTING PATIENT LIPID ANSWER: YES
FASTING STATUS PATIENT QL REPORTED: YES
GLOBULIN PLAS-MCNC: 3.9 G/DL (ref 2–3.5)
GLUCOSE BLD-MCNC: 117 MG/DL (ref 70–99)
GLUCOSE UR STRIP.AUTO-MCNC: >1000 MG/DL
HBA1C MFR BLD: 6.9 % (ref ?–5.7)
HDLC SERPL-MCNC: 46 MG/DL (ref 40–59)
KETONES UR STRIP.AUTO-MCNC: NEGATIVE MG/DL
LDLC SERPL CALC-MCNC: 136 MG/DL (ref ?–100)
LEUKOCYTE ESTERASE UR QL STRIP.AUTO: 500
MICROALBUMIN UR-MCNC: 1.4 MG/DL
MICROALBUMIN/CREAT 24H UR-RTO: 19.3 UG/MG (ref ?–30)
NITRITE UR QL STRIP.AUTO: NEGATIVE
NONHDLC SERPL-MCNC: 157 MG/DL (ref ?–130)
OSMOLALITY SERPL CALC.SUM OF ELEC: 304 MOSM/KG (ref 275–295)
PH UR STRIP.AUTO: 5 [PH] (ref 5–8)
POTASSIUM SERPL-SCNC: 3.9 MMOL/L (ref 3.5–5.1)
PROT SERPL-MCNC: 8.6 G/DL (ref 5.7–8.2)
PROT UR STRIP.AUTO-MCNC: NEGATIVE MG/DL
SODIUM SERPL-SCNC: 143 MMOL/L (ref 136–145)
SP GR UR STRIP.AUTO: 1.01 (ref 1–1.03)
TRIGL SERPL-MCNC: 116 MG/DL (ref 30–149)
UROBILINOGEN UR STRIP.AUTO-MCNC: NORMAL MG/DL
VIT D+METAB SERPL-MCNC: 26 NG/ML (ref 30–100)
VLDLC SERPL CALC-MCNC: 21 MG/DL (ref 0–30)
WBC #/AREA URNS AUTO: >50 /HPF

## 2025-04-15 PROCEDURE — 81001 URINALYSIS AUTO W/SCOPE: CPT

## 2025-04-15 PROCEDURE — 87077 CULTURE AEROBIC IDENTIFY: CPT

## 2025-04-15 PROCEDURE — 36415 COLL VENOUS BLD VENIPUNCTURE: CPT | Performed by: FAMILY MEDICINE

## 2025-04-15 PROCEDURE — 82043 UR ALBUMIN QUANTITATIVE: CPT

## 2025-04-15 PROCEDURE — 80061 LIPID PANEL: CPT | Performed by: FAMILY MEDICINE

## 2025-04-15 PROCEDURE — 80053 COMPREHEN METABOLIC PANEL: CPT | Performed by: FAMILY MEDICINE

## 2025-04-15 PROCEDURE — 82570 ASSAY OF URINE CREATININE: CPT

## 2025-04-15 PROCEDURE — 82306 VITAMIN D 25 HYDROXY: CPT

## 2025-04-15 PROCEDURE — 87086 URINE CULTURE/COLONY COUNT: CPT

## 2025-04-15 PROCEDURE — 83036 HEMOGLOBIN GLYCOSYLATED A1C: CPT | Performed by: FAMILY MEDICINE

## 2025-04-16 ENCOUNTER — OFFICE VISIT (OUTPATIENT)
Dept: UROLOGY | Facility: CLINIC | Age: 75
End: 2025-04-16
Attending: OBSTETRICS & GYNECOLOGY
Payer: MEDICAID

## 2025-04-16 ENCOUNTER — TELEPHONE (OUTPATIENT)
Dept: FAMILY MEDICINE CLINIC | Facility: CLINIC | Age: 75
End: 2025-04-16

## 2025-04-16 VITALS — RESPIRATION RATE: 16 BRPM | TEMPERATURE: 97 F | HEIGHT: 62 IN | BODY MASS INDEX: 24 KG/M2

## 2025-04-16 DIAGNOSIS — N81.3 COMPLETE UTEROVAGINAL PROLAPSE: Primary | ICD-10-CM

## 2025-04-16 DIAGNOSIS — N81.84 PELVIC MUSCLE WASTING: ICD-10-CM

## 2025-04-16 DIAGNOSIS — N95.2 VAGINAL ATROPHY: ICD-10-CM

## 2025-04-16 DIAGNOSIS — N32.81 DETRUSOR INSTABILITY: ICD-10-CM

## 2025-04-16 DIAGNOSIS — N39.41 URGE INCONTINENCE: ICD-10-CM

## 2025-04-16 PROCEDURE — 99212 OFFICE O/P EST SF 10 MIN: CPT

## 2025-04-16 NOTE — TELEPHONE ENCOUNTER
Called pt to inform of no show, daughter answered said she thought appointment was at 2:40, appointment rescheduled, no show medicaid letter sent

## 2025-04-16 NOTE — PROGRESS NOTES
Pt presents for f/u of pelvic organ prolapse, pessary care  Here today with her daughter, Thor    Using #5 SS gellhorn pessary, office care    Reports pessary is comfortable   Denies discharge  Denies bleeding  Denies s/sx of UTI  Bowels reg  Pt is using vaginal estrogen cream 2x weekly    Happy with pessary, feels supported  She is not currently interested in surgical management of her pelvic organ prolapse  -- considering later this year    Hx of PMB last year  Pelvic US 9/9/24 showed endometrial thickness 8 mm  9/23/24 and had EMB negative for hyperplasia or malignancy      UUI  Currently taking Gemtesa 75 mg  Denies SEs  Reports 80% improvement, happy     UDS 12/27/24:  90/80 mL & 300/175 mL  MCFP 306 mL  DO @ 154 mL  FAY @ 100 mL    Urogynecology Summary:  Urogynecology Summary  Prolapse: No  FAY: No  Urge Incontinence: No  Nocturia Frequency: 2  Frequency: Greater than 3 hours (every 3-4 hours)  Incomplete emptying: No  Constipation: No  Wears pad day?: 0  Wears Pad Night?: 0  Activities are limited by UI/POP?: No  Currently Sexually Active: No      Vitals:  Vitals:    04/16/25 1452   Resp: 16   Temp: 97 °F (36.1 °C)       GENERAL EXAM:  GENERAL: alert & oriented, NAD  HEENT: NC/AT, sclera without injection  SKIN: no rashes  LUNGS:  without increased respiratory effort  ABDOMEN: soft, non-tender, non-distended, no masses appreciated  EXT: no edema    PELVIC EXAM: PAUL Acuna, present for exam as a chaperone  External Genitalia: Normal appearance for age. + atrophy, no lesions  Urethra: + atrophy, non tender  Bladder:no fullness, non tender  Vagina: + atrophy, no lesions. There is prolapse at rest with cervix at + 7 cm.   Cervix: some erythema, no lesions, no bleeding  Uterus: soft, mobile, non tender  Perineum: non tender  Anus: no hemorrhoids  Rectum: deferred.      PELVIC SUPPORT:  [place]:  4  Ant:  4  Post:  2  CST:  negative  UVJ: + hypermobile     Her pessary was removed, cleaned, and reinserted without  difficulty.    Impression:    ICD-10-CM    1. Complete uterovaginal prolapse  N81.3       2. Urge incontinence  N39.41       3. Vaginal atrophy  N95.2       4. Pelvic muscle wasting  N81.84       5. Detrusor instability  N32.81           Discussion Items:   Discussed mgmt of vulvovaginal atrophy with vaginal estrogen cream. Reviewed associated benefits, risks, alternatives, and goals. Recommend low dose 2-3x weekly mgmt.     Discussed management of pelvic organ prolapse including but not limited to behavioral modifications, conservative options, and surgical management.   Discussed pessary management including benefits and risks. Discussed importance of keeping regularly scheduled pessary checks in prevention of complications related to pessary use.     Treatment Plan:  Continue pessary management, office care  Continue vag estrogen as prescribed  Continue Gemtesa 75 mg  Encouraged daily pelvic exercises  Cont bowel regimen  Call with s/sx of UTI, problems with pessary     All questions answered  Pt understands and agrees to plan       Return in about 10 weeks (around 6/25/2025) for pessary care.      Jamia Mendosa PA-C    Note to patient: The 21st Century Cures Act makes medical notes like these available to patients in the interest of transparency.  However, be advised this is a medical document.  It is intended as peer to peer communication.  It is written in medical language and may contain abbreviations or verbiage that are unfamiliar.  It may appear blunt or direct.  Medical documents are intended to carry relevant information, facts as evident, and the clinical opinion of the practitioner.

## 2025-05-06 ENCOUNTER — OFFICE VISIT (OUTPATIENT)
Dept: FAMILY MEDICINE CLINIC | Facility: CLINIC | Age: 75
End: 2025-05-06
Payer: MEDICAID

## 2025-05-06 VITALS
DIASTOLIC BLOOD PRESSURE: 64 MMHG | OXYGEN SATURATION: 96 % | BODY MASS INDEX: 24.94 KG/M2 | HEIGHT: 62.13 IN | WEIGHT: 137.25 LBS | HEART RATE: 74 BPM | TEMPERATURE: 98 F | SYSTOLIC BLOOD PRESSURE: 130 MMHG | RESPIRATION RATE: 16 BRPM

## 2025-05-06 DIAGNOSIS — Z00.00 ROUTINE GENERAL MEDICAL EXAMINATION AT A HEALTH CARE FACILITY: Primary | ICD-10-CM

## 2025-05-06 DIAGNOSIS — E11.40 TYPE 2 DIABETES MELLITUS WITH DIABETIC NEUROPATHY, WITH LONG-TERM CURRENT USE OF INSULIN (HCC): ICD-10-CM

## 2025-05-06 DIAGNOSIS — Z79.4 TYPE 2 DIABETES MELLITUS WITH DIABETIC NEUROPATHY, WITH LONG-TERM CURRENT USE OF INSULIN (HCC): ICD-10-CM

## 2025-05-06 DIAGNOSIS — I10 ESSENTIAL HYPERTENSION: ICD-10-CM

## 2025-05-06 DIAGNOSIS — N18.4 STAGE 4 CHRONIC KIDNEY DISEASE (HCC): ICD-10-CM

## 2025-05-06 DIAGNOSIS — N39.41 URGE INCONTINENCE: ICD-10-CM

## 2025-05-06 DIAGNOSIS — N81.3 COMPLETE UTEROVAGINAL PROLAPSE: ICD-10-CM

## 2025-05-06 DIAGNOSIS — E78.2 MIXED HYPERLIPIDEMIA: ICD-10-CM

## 2025-05-06 PROCEDURE — 99397 PER PM REEVAL EST PAT 65+ YR: CPT | Performed by: FAMILY MEDICINE

## 2025-05-06 PROCEDURE — 99214 OFFICE O/P EST MOD 30 MIN: CPT | Performed by: FAMILY MEDICINE

## 2025-05-06 RX ORDER — ATORVASTATIN CALCIUM 20 MG/1
20 TABLET, FILM COATED ORAL NIGHTLY
Qty: 90 TABLET | Refills: 1 | Status: SHIPPED | OUTPATIENT
Start: 2025-05-06

## 2025-05-06 RX ORDER — ASPIRIN 81 MG/1
81 TABLET, CHEWABLE ORAL DAILY
Qty: 90 TABLET | Refills: 1 | Status: SHIPPED | OUTPATIENT
Start: 2025-05-06

## 2025-05-06 RX ORDER — EMPAGLIFLOZIN AND LINAGLIPTIN 25; 5 MG/1; MG/1
1 TABLET, FILM COATED ORAL DAILY
Qty: 90 TABLET | Refills: 0 | Status: SHIPPED | OUTPATIENT
Start: 2025-05-06 | End: 2025-05-13

## 2025-05-06 RX ORDER — AMLODIPINE BESYLATE 10 MG/1
10 TABLET ORAL DAILY
Qty: 90 TABLET | Refills: 1 | Status: SHIPPED | OUTPATIENT
Start: 2025-05-06

## 2025-05-06 RX ORDER — BLOOD SUGAR DIAGNOSTIC
STRIP MISCELLANEOUS
Qty: 100 EACH | Refills: 2 | Status: SHIPPED | OUTPATIENT
Start: 2025-05-06

## 2025-05-06 RX ORDER — CARVEDILOL 12.5 MG/1
12.5 TABLET ORAL NIGHTLY
Qty: 90 TABLET | Refills: 1 | Status: SHIPPED | OUTPATIENT
Start: 2025-05-06

## 2025-05-06 RX ORDER — LANCETS 33 GAUGE
1 EACH MISCELLANEOUS DAILY
Qty: 100 EACH | Refills: 2 | Status: SHIPPED | OUTPATIENT
Start: 2025-05-06

## 2025-05-06 RX ORDER — VALSARTAN AND HYDROCHLOROTHIAZIDE 160; 12.5 MG/1; MG/1
1 TABLET, FILM COATED ORAL DAILY
Qty: 90 TABLET | Refills: 1 | Status: SHIPPED | OUTPATIENT
Start: 2025-05-06

## 2025-05-06 NOTE — PROGRESS NOTES
Chief Complaint   Patient presents with    Physical     The following individual(s) verbally consented to be recorded using ambient AI listening technology and understand that they can each withdraw their consent to this listening technology at any point by asking the clinician to turn off or pause the recording:    Patient name: Tuyet Maciel   Additional names:  Jared Hollandmagen       Lab Results       History of Present Illness  Tuyet Maciel is a 75 year old female presents with daughter for annual physical, f/u on chronic conditions and recent labs.     DM2, taking meds as directed. However, does note that she eats sweets regularly. Her blood sugar levels have increased slightly, with her A1c now at 6.9, up from 6.5 previously. She is currently taking Lantus at 10 units daily and has been using a combination medication from [place] for her diabetes, which includes Jardiance and Linagliptin. Denies any paresthesias. Due for diabetic eye exam.     HL, has not been on a statin. Cholesterol levels have also increased, with her total cholesterol rising from 189 to 203 and her LDL levels also increasing.      CKD, has not seen her nephrologist recently.       She has been experiencing urinary incontinence due to uterine prolapse, which has improved with the use of a pessary and Gemtesa, reducing her need for pads and diapers.    No headaches, lightheadedness, dizziness, low blood sugar episodes, chest pain, difficulty breathing, or stomach pains.        Review of Systems   Constitutional: Negative for fever, chills and fatigue   HENT: Negative for hearing loss, congestion, sore throat    Eyes: Negative for pain and visual disturbance.   Respiratory: Negative for cough, chest tightness, shortness of breath and wheezing.    Cardiovascular: Negative for chest pain, palpitations and leg swelling.   Gastrointestinal: Negative for nausea, vomiting, abdominal pain, diarrhea, blood in stool Genitourinary: Negative for dysuria,  hematuria and difficulty urinating.   Musculoskeletal: Negative for myalgias, back pain, joint swelling   Skin: Negative for color change and rash.   Neurological: Negative for dizziness, syncope, weakness, and headaches.   Hematological: Negative for adenopathy. Does not bruise/bleed easily.   Psychiatric/Behavioral: The patient is not nervous/anxious. No depression.    Problem List[1]    Past Medical History[2]  Past Surgical History[3]  Family History[4]  Short Social Hx on File[5]    Current Medications[6]    Allergies[7]      Physical Exam  /64   Pulse 74   Temp 97.6 °F (36.4 °C) (Temporal)   Resp 16   Ht 5' 2.13\" (1.578 m)   Wt 137 lb 4 oz (62.3 kg)   SpO2 96%   BMI 25.00 kg/m²   Constitutional: Oriented to person, place, and time. No distress.   HEENT:  Normocephalic and atraumatic. Hearing and tympanic membranes normal. Oropharynx is clear and moist.   Eyes: EOM are normal. PERRLA.    Neck: Supple  Cardiovascular: Normal rate, regular rhythm and intact distal pulses. Normal S1, S2  Pulmonary/Chest: Effort normal and breath sounds normal. No respiratory distress. No wheezes, rhonchi or rales  Abdominal: Soft. Bowel sounds are normal. Non tender, no masses, no organomegaly   Musculoskeletal: Normal range of motion. No edema and no tenderness.   Lymphadenopathy: No cervical adenopathy.   Neurological: Normal reflexes. No cranial nerve deficit or sensory deficit. Normal muscle tone. Coordination normal.   Skin: Skin is warm and dry. No rash noted. No erythema   Psychiatric: Normal mood and affect.     Health Maintenance:    1. Colon cancer screening: n/a  2. Last mammogram: n/a  3. Last Pap smear: n/a  4. Dexa Scan: defers  5. Immunizations:   Immunization History   Administered Date(s) Administered    Covid-19 Vaccine Moderna 100 mcg/0.5 ml 03/03/2021, 04/02/2021    Covid-19 Vaccine Moderna 50 Mcg/0.25 Ml 01/09/2022    FLU VAC High Dose 65 YRS & Older PRSV Free (47040) 10/08/2018, 12/10/2021     FLUAD High Dose 65 yr and older (91789) 11/01/2017    Pneumococcal (Prevnar 13) 11/01/2017    Pneumovax 23 02/04/2019    Zoster Vaccine Recombinant Adjuvanted (Shingrix) 12/10/2021, 04/11/2022       Osteoporosis Screening: Post menopausal women with a > 9% of fracture in the next 10 years.   Http://[email or web address].ac.uk/FRAX/tool.aspx?country=9    Cervical Cancer Screening: The USPSTF recommends screening for cervical cancer in women ages 21 to 65 years with cytology (Pap smear) every 3 years or, for women ages 30 to 65 years who want to lengthen the screening interval, screening with a combination of cytology and human papillomavirus (HPV) testing every 5 years.    Breast Cancer Screening: Yearly starting at the age of 40.  If positive family hx (first degree relative) - Annual mammography starting ten years prior to the age of the youngest family member with breast cancer (but not earlier than age 25 and not later than age 40)    Colon Cancer Screening: Starting at the age of 50.  Yearly FOBT, sigmoidoscopy every 5 years, or colonoscopy every 10 years.       A/P:  1. Routine general medical examination at a health care facility  - reviewed anticipatory guidance based on age    2. Type 2 diabetes mellitus with diabetic neuropathy, with long-term current use of insulin (HCC)  Increased A1c from 6.5 to 6.9. Current management includes Lantus and medications from [place]. Potential insurance issues with Glyxambi.  - Continue Lantus 10 units daily.  - Investigate insurance coverage for Glyxambi and consider alternatives if not covered.  - Repeat labs in 3 months to monitor A1c.  - Refer to ophthalmology for diabetic eye exam  - Check blood glucose levels and feet daily  - Monitor  - Lancets (ONETOUCH DELICA PLUS [id number]) Does not apply Misc; 1 strip by In Vitro route daily.  Dispense: 100 each; Refill: 2  - Glucose Blood (ONETOUCH ULTRA) In Vitro Strip; USE TO CHECK GLUCOSE ONCE DAILY  Dispense: 100 each; Refill: 2  -  aspirin 81 MG Oral Chew Tab; Chew 1 tablet (81 mg total) by mouth in the morning.  Dispense: 90 tablet; Refill: 1  - CMP in 3 months; Future  - Lipid in 3 months; Future  - Hemoglobin A1C in 3 months; Future  - Ophthalmology Referral - In Network    3. Stage 4 chronic kidney disease (HCC)  - Nephrology Referral - In Network  - CMP in 3 months; Future    4. Essential hypertension  - bp stable  - cpm  - valsartan-hydroCHLOROthiazide 160-12.5 MG Oral Tab; Take 1 tablet by mouth in the morning.  Dispense: 90 tablet; Refill: 1  - carvedilol 12.5 MG Oral Tab; Take 1 tablet (12.5 mg total) by mouth at bedtime.  Dispense: 90 tablet; Refill: 1  - amLODIPine 10 MG Oral Tab; Take 1 tablet (10 mg total) by mouth daily.  Dispense: 90 tablet; Refill: 1  - CMP in 3 months; Future  - Lipid in 3 months; Future    5. Mixed hyperlipidemia  - start atorvastatin 20mg po at bedtime again, reviewed indications, dosing and SEs  - recheck labs in 3mo  - follow low chol diet and exercise as able  - atorvastatin 20 MG Oral Tab; Take 1 tablet (20 mg total) by mouth nightly.  Dispense: 90 tablet; Refill: 1  - CMP in 3 months; Future  - Lipid in 3 months; Future    6. Complete uterovaginal prolapse  7. Urge incontinence  - Continue current management with pessary and Gemtesa.  - following with urology    She and daughter understand and agree with tx plan  RTC in 3mo, sooner if needed         Orders Placed This Encounter   Procedures    CMP in 3 months    Lipid in 3 months    Hemoglobin A1C in 3 months       Meds & Refills for this Visit:  Requested Prescriptions     Signed Prescriptions Disp Refills    atorvastatin 20 MG Oral Tab 90 tablet 1     Sig: Take 1 tablet (20 mg total) by mouth nightly.    valsartan-hydroCHLOROthiazide 160-12.5 MG Oral Tab 90 tablet 1     Sig: Take 1 tablet by mouth in the morning.    Lancets (ONETOUCH DELICA PLUS [id number]) Does not apply Misc 100 each 2     Si strip by In Vitro route daily.    Glucose Blood  (ONETOUCH ULTRA) In Vitro Strip 100 each 2     Sig: USE TO CHECK GLUCOSE ONCE DAILY    carvedilol 12.5 MG Oral Tab 90 tablet 1     Sig: Take 1 tablet (12.5 mg total) by mouth at bedtime.    aspirin 81 MG Oral Chew Tab 90 tablet 1     Sig: Chew 1 tablet (81 mg total) by mouth in the morning.    amLODIPine 10 MG Oral Tab 90 tablet 1     Sig: Take 1 tablet (10 mg total) by mouth daily.       Imaging & Consults:  NEPHROLOGY - INTERNAL  OPHTHALMOLOGY - INTERNAL      No follow-ups on file.         [1]   Patient Active Problem List  Diagnosis    Essential hypertension with goal blood pressure less than 140/90    Type II or unspecified type diabetes mellitus without mention of complication, not stated as uncontrolled    Type 2 diabetes mellitus with diabetic neuropathy (HCC)    Anemia    Elevated troponin    Benign essential HTN    CKD (chronic kidney disease) stage 4, GFR 15-29 ml/min (HCC)    Chronic renal disease, stage III (HCC)    Mixed hyperlipidemia    Uncontrolled type 2 diabetes mellitus with hyperglycemia ([facility])    Postmenopausal bleeding    Complete uterovaginal prolapse    Cystocele, midline    Rectocele    Female stress incontinence    Nocturia    Urge incontinence    Vaginal atrophy    Incomplete uterovaginal prolapse   [2]   Past Medical History:   Cataract    Type II or unspecified type diabetes mellitus without mention of complication, not stated as uncontrolled    Unspecified essential hypertension   [3]   Past Surgical History:  Procedure Laterality Date    Cataract      Cholecystectomy     [4]   Family History  Problem Relation Age of Onset    Breast Cancer Mother 70        approx     Breast Cancer Sister 60        approx age    Breast Cancer Maternal Aunt 65        approx age    [5]   Social History  Socioeconomic History    Marital status:    Tobacco Use    Smoking status: Never     Passive exposure: Never    Smokeless tobacco: Never   Vaping Use    Vaping status: Never Used   Substance and  Sexual Activity    Alcohol use: No    Drug use: No   [6]   Current Outpatient Medications   Medication Sig Dispense Refill    atorvastatin 20 MG Oral Tab Take 1 tablet (20 mg total) by mouth nightly. 90 tablet 1    valsartan-hydroCHLOROthiazide 160-12.5 MG Oral Tab Take 1 tablet by mouth in the morning. 90 tablet 1    Lancets (ONETOUCH DELICA PLUS [id number]) Does not apply Misc 1 strip by In Vitro route daily. 100 each 2    Glucose Blood (ONETOUCH ULTRA) In Vitro Strip USE TO CHECK GLUCOSE ONCE DAILY 100 each 2    carvedilol 12.5 MG Oral Tab Take 1 tablet (12.5 mg total) by mouth at bedtime. 90 tablet 1    aspirin 81 MG Oral Chew Tab Chew 1 tablet (81 mg total) by mouth in the morning. 90 tablet 1    amLODIPine 10 MG Oral Tab Take 1 tablet (10 mg total) by mouth daily. 90 tablet 1    insulin glargine (LANTUS SOLOSTAR) 100 UNIT/ML Subcutaneous Solution Pen-injector Inject 10 Units into the skin nightly. 15 mL 0    Vibegron (GEMTESA) 75 MG Oral Tab Take by mouth.      estradiol (ESTRACE) 0.1 MG/GM Vaginal Cream Apply 1/2 gram vaginally 3 times per week. 42.5 g 3    Vibegron (GEMTESA) 75 MG Oral Tab Take 1 tablet by mouth daily. 90 tablet 3    empagliflozin (JARDIANCE) 25 MG Oral Tab Take 1 tablet (25 mg total) by mouth daily. 90 tablet 0   [7] No Known Allergies

## 2025-05-12 ENCOUNTER — TELEPHONE (OUTPATIENT)
Dept: INTERNAL MEDICINE CLINIC | Facility: CLINIC | Age: 75
End: 2025-05-12

## 2025-05-12 NOTE — TELEPHONE ENCOUNTER
Empagliflozin-linaGLIPtin (GLYXAMBI) 25-5 MG Oral Tab  requires a prior auth and chart notes are still open from 5/6/25    Please complete and route back to triage support

## 2025-05-13 NOTE — TELEPHONE ENCOUNTER
Attempted to call daughter's cell (primary #, ok per SIRSIHA). No answer, VM not set up. Patient is active on [employer].  [employer] message sent to patient. Postponed message to ensure read.  If not read, nurse will reach out to patient.

## 2025-05-14 RX ORDER — BLOOD-GLUCOSE METER
EACH MISCELLANEOUS
Refills: 0 | Status: CANCELLED | OUTPATIENT
Start: 2025-05-14

## 2025-05-14 NOTE — TELEPHONE ENCOUNTER
Patients pharmacy called requesting new contour plus meter be sent over because that is what insurance now covers

## 2025-05-15 NOTE — TELEPHONE ENCOUNTER
Spoke to pt's daughter Thor. Informed her that insurance denied coverage of glyxambi. Dr. Peterson sent in Rx for empagliflozin (JARDIANCE) 25 MG Oral Tab to [facility] pharmacy. Thor voiced understanding.

## 2025-06-18 ENCOUNTER — OFFICE VISIT (OUTPATIENT)
Dept: UROLOGY | Facility: CLINIC | Age: 75
End: 2025-06-18
Attending: OBSTETRICS & GYNECOLOGY
Payer: MEDICAID

## 2025-06-18 VITALS — RESPIRATION RATE: 16 BRPM | HEIGHT: 62.13 IN | TEMPERATURE: 98 F | BODY MASS INDEX: 25 KG/M2

## 2025-06-18 DIAGNOSIS — N32.81 DETRUSOR INSTABILITY: ICD-10-CM

## 2025-06-18 DIAGNOSIS — N39.41 URGE INCONTINENCE: ICD-10-CM

## 2025-06-18 DIAGNOSIS — N81.3 COMPLETE UTEROVAGINAL PROLAPSE: Primary | ICD-10-CM

## 2025-06-18 DIAGNOSIS — N95.2 VAGINAL ATROPHY: ICD-10-CM

## 2025-06-18 DIAGNOSIS — N39.41 URGE URINARY INCONTINENCE: ICD-10-CM

## 2025-06-18 PROCEDURE — 99212 OFFICE O/P EST SF 10 MIN: CPT

## 2025-06-18 NOTE — PROGRESS NOTES
Pt presents for f/u of pelvic organ prolapse, pessary care  Here today with her daughter, Thor  Using #5 SS gellhorn pessary, office care    Reports pessary is comfortable   Denies discharge  Denies bleeding  Denies s/sx of UTI  Bowels reg  Pt is using vaginal estrogen cream 2x weekly    Happy with pessary, feels supported  She is not currently interested in surgical management of her pelvic organ prolapse    UUI  Currently taking Gemtesa 75 mg  Denies SEs  Reports 80% improvement, happy    Pt reported PMB to PCP office last year  Pelvic US done 9/9/24 showed thickened endometrium at 8 mm  EMB by GYN office -- path benign    Urogynecology Summary:  Urogynecology Summary  Prolapse: No  FAY: No  Urge Incontinence: No  Nocturia Frequency: 2  Frequency: Greater than 3 hours (every 3-4 hours)  Incomplete emptying: No  Constipation: No  Wears pad day?: 0  Wears Pad Night?: 0  Activities are limited by UI/POP?: No  Currently Sexually Active: No      Vitals:  Vitals:    06/18/25 1244   Resp: 16   Temp: 97.8 °F (36.6 °C)       GENERAL EXAM:  GENERAL: alert & oriented, NAD  HEENT: NC/AT, sclera without injection  SKIN: no rashes  LUNGS:  without increased respiratory effort  ABDOMEN: soft, non-tender, non-distended, no masses appreciated  EXT: no edema    PELVIC EXAM: PAUL Acuna, present for exam as a chaperone  External Genitalia: Normal appearance for age. + atrophy, no lesions  Urethra: + atrophy, non tender  Bladder:no fullness, non tender  Vagina: + atrophy, +erythema and some bleeding of R vaginal wall, bleeding controlled with pressure from hua swab  Cervix: some erythema, no lesions, no bleeding  Uterus: soft, mobile, non tender  Perineum: non tender  Anus: no hemorrhoids  Rectum: deferred.      PELVIC SUPPORT:  Eldridge:  4  Ant:  4  Post:  2  CST:  negative  UVJ: + hypermobile     Her pessary was removed, cleaned, and reinserted without difficulty.    Impression:    ICD-10-CM    1. Complete uterovaginal prolapse   N81.3       2. Urge incontinence  N39.41       3. Vaginal atrophy  N95.2       4. Detrusor instability  N32.81       5. Urge urinary incontinence  N39.41           Discussion Items:   Discussed mgmt of vulvovaginal atrophy with vaginal estrogen cream. Reviewed associated benefits, risks, alternatives, and goals. Recommend low dose 2-3x weekly mgmt.     Discussed management of pelvic organ prolapse including but not limited to behavioral modifications, conservative options, and surgical management.   Discussed pessary management including benefits and risks. Discussed importance of keeping regularly scheduled pessary checks in prevention of complications related to pessary use.     Treatment Plan:  Continue pessary management, office care  Continue vag estrogen as prescribed  Continue Gemtesa 75 mg  Encouraged daily pelvic exercises  Cont bowel regimen  Call with s/sx of UTI, problems with pessary     All questions answered  Pt understands and agrees to plan       Return in about 3 months (around 9/18/2025) for pessary care.      Jamia Mendosa PA-C    Note to patient: The 21st Century Cures Act makes medical notes like these available to patients in the interest of transparency.  However, be advised this is a medical document.  It is intended as peer to peer communication.  It is written in medical language and may contain abbreviations or verbiage that are unfamiliar.  It may appear blunt or direct.  Medical documents are intended to carry relevant information, facts as evident, and the clinical opinion of the practitioner.

## 2025-06-21 NOTE — TELEPHONE ENCOUNTER
Hypertension Medications Protocol [id number]/10/2021 06:27 PM   CMP or BMP in past 12 months Protocol Details    Last serum creatinine< 2.0     Appointment in past 6 or next 3 months      LOV  2/12/21     LAST LAB 1/28/21     LAST RX  2/5/21 30 with 2 refill Inpatient Radiology Pre-procedure Note    History of Present Illness:  Domingo Gross is a 62 y.o. male who presents for Thrombrosed left arm graft.    Admission H&P reviewed.  Past Medical History:   Diagnosis Date    Acute congestive heart failure 09/13/2024    Allergy     Anemia     Anticoagulant long-term use     Arthritis     CKD (chronic kidney disease) stage 4, GFR 15-29 ml/min     Closed fracture of transverse process of lumbar vertebra 02/16/2024    COPD (chronic obstructive pulmonary disease) 08/20/2021    Coronary artery disease     Heart attack 10/04/2019    Hematuria     Hemothorax     Hyperlipidemia     Hypertension     Hyperuricemia     Hypocalcemia     Hypokalemia     Hypophosphatemia     Hypothyroidism 03/02/2023    PAD (peripheral artery disease)     Proteinuria     Vitamin D deficiency      Past Surgical History:   Procedure Laterality Date    ABDOMINAL AORTOGRAPHY N/A 5/10/2019    Procedure: AORTOGRAM-ABDOMINAL;  Surgeon: Keo Jenkins MD;  Location: [facility] CATH LAB/EP;  Service: Cardiology;  Laterality: N/A;  RSFA intervention     ANGIOGRAM, CORONARY, WITH LEFT HEART CATHETERIZATION N/A 1/24/2025    Procedure: Angiogram, Coronary, with Left Heart Cath;  Surgeon: Valente Moran MD;  Location: [facility] CATH LAB/EP;  Service: Cardiology;  Laterality: N/A;    AORTOGRAPHY WITH SERIALOGRAPHY N/A 12/3/2021    Procedure: AORTOGRAM, WITH SERIALOGRAPHY;  Surgeon: Keo Jenkins MD;  Location: [facility] CATH LAB/EP;  Service: Cardiology;  Laterality: N/A;    AORTOGRAPHY WITH SERIALOGRAPHY N/A 4/1/2022    Procedure: AORTOGRAM, WITH SERIALOGRAPHY;  Surgeon: Keo Jenkins MD;  Location: [facility] CATH LAB/EP;  Service: Cardiology;  Laterality: N/A;    ATHERECTOMY, CORONARY N/A 4/25/2023    Procedure: Atherectomy-coronary;  Surgeon: Keo Jenkins MD;  Location: [facility] CATH LAB/EP;  Service: Cardiology;  Laterality: N/A;    ATHERECTOMY, CORONARY N/A 1/24/2025    Procedure: Atherectomy-coronary;  Surgeon: Dean  Valente MATA MD;  Location: [facility] CATH LAB/EP;  Service: Cardiology;  Laterality: N/A;    BONE MARROW BIOPSY Right 10/12/2021    Procedure: BIOPSY-BONE MARROW;  Surgeon: Naseem Woods MD;  Location: [facility] OR;  Service: Oncology;  Laterality: Right;    CARDIAC CATHETERIZATION      CATARACT EXTRACTION      CATARACT EXTRACTION W/  INTRAOCULAR LENS IMPLANT Right 6/8/2020    Procedure: EXTRACTION, CATARACT, WITH IOL INSERTION;  Surgeon: Tin Light MD;  Location: [facility] OR;  Service: Ophthalmology;  Laterality: Right;    CATARACT EXTRACTION W/  INTRAOCULAR LENS IMPLANT Left 7/2/2020    Procedure: EXTRACTION, CATARACT, WITH IOL INSERTION;  Surgeon: Tin Light MD;  Location: [facility] OR;  Service: Ophthalmology;  Laterality: Left;    COLONOSCOPY N/A 1/6/2022    Procedure: COLONOSCOPY;  Surgeon: Kathe Penaloza MD;  Location: [facility] ENDO (2ND FLR);  Service: Endoscopy;  Laterality: N/A;  COVID test at [facility] on 1/3-GT  okay to hold Plavix for 5 days and aspirin per Dr. Becerra  2nd floor due toextensive cardiac history   instructions mailed and [facility] -     CORONARY ANGIOGRAPHY N/A 3/29/2019    Procedure: ANGIOGRAM, CORONARY ARTERY;  Surgeon: Keo Jenkins MD;  Location: [facility] CATH LAB/EP;  Service: Cardiology;  Laterality: N/A;    CORONARY ANGIOGRAPHY Left 10/11/2019    Procedure: ANGIOGRAM, CORONARY ARTERY;  Surgeon: Keo Jenkins MD;  Location: [facility] CATH LAB/EP;  Service: Cardiology;  Laterality: Left;    CORONARY ANGIOGRAPHY N/A 4/10/2023    Procedure: ANGIOGRAM, CORONARY ARTERY;  Surgeon: Keo Jenkins MD;  Location: [facility] CATH LAB/EP;  Service: Cardiology;  Laterality: N/A;    CORONARY ANGIOGRAPHY N/A 6/26/2024    Procedure: ANGIOGRAM, CORONARY ARTERY;  Surgeon: Enoch Tirado MD;  Location: [facility] CATH LAB/EP;  Service: Cardiology;  Laterality: N/A;    CORONARY ANGIOGRAPHY N/A 9/16/2024    Procedure: ANGIOGRAM, CORONARY ARTERY;  Surgeon: Enoch Tirado MD;  Location: [facility] CATH LAB/EP;   Service: Cardiology;  Laterality: N/A;    CORONARY ANGIOPLASTY WITH STENT PLACEMENT  03/29/2019    mid and distal RCA    CORONARY ANGIOPLASTY WITH STENT PLACEMENT N/A 1/24/2025    Procedure: ANGIOPLASTY, CORONARY ARTERY, WITH STENT INSERTION;  Surgeon: Valente Moran MD;  Location: [facility] CATH LAB/EP;  Service: Cardiology;  Laterality: N/A;    ESOPHAGOGASTRODUODENOSCOPY N/A 1/6/2022    Procedure: EGD (ESOPHAGOGASTRODUODENOSCOPY);  Surgeon: Kathe Penaloza MD;  Location: [address]);  Service: Endoscopy;  Laterality: N/A;    EYE SURGERY      INSERTION OF TUNNELED CENTRAL VENOUS HEMODIALYSIS CATHETER N/A 2/9/2024    Procedure: INSERTION, CATHETER, HEMODIALYSIS, DUAL LUMEN;  Surgeon: Wang Mendieta MD;  Location: [facility] OR;  Service: General;  Laterality: N/A;    INSTANTANEOUS WAVE-FREE RATIO (IFR) N/A 4/25/2023    Procedure: Instantaneous Wave-Free Ratio (IFR);  Surgeon: Keo Jenkins MD;  Location: [facility] CATH LAB/EP;  Service: Cardiology;  Laterality: N/A;    INTRAVASCULAR ULTRASOUND, NON-CORONARY  8/12/2022    Procedure: Intravascular Ultrasound, Non-Coronary;  Surgeon: Keo Jenkins MD;  Location: [facility] CATH LAB/EP;  Service: Cardiology;;    IVUS, CORONARY  4/25/2023    Procedure: IVUS, Coronary;  Surgeon: Keo Jenkins MD;  Location: [facility] CATH LAB/EP;  Service: Cardiology;;    IVUS, CORONARY  5/16/2023    Procedure: IVUS, Coronary;  Surgeon: Keo Jenkins MD;  Location: [facility] CATH LAB/EP;  Service: Cardiology;;  CX    IVUS, CORONARY  1/24/2025    Procedure: IVUS, Coronary;  Surgeon: Valente Moran MD;  Location: [facility] CATH LAB/EP;  Service: Cardiology;;    LEFT HEART CATHETERIZATION N/A 3/29/2019    Procedure: Left heart cath;  Surgeon: Keo Jenkins MD;  Location: [facility] CATH LAB/EP;  Service: Cardiology;  Laterality: N/A;    LEFT HEART CATHETERIZATION Left 10/8/2019    Procedure: Left heart cath;  Surgeon: Keo Jenkins MD;  Location: [facility] CATH LAB/EP;  Service: Cardiology;  Laterality: Left;     LEFT HEART CATHETERIZATION Left 4/10/2023    Procedure: Left heart cath;  Surgeon: Keo Jenkins MD;  Location: [facility] CATH LAB/EP;  Service: Cardiology;  Laterality: Left;    LEFT HEART CATHETERIZATION Left 4/25/2023    Procedure: Left heart cath;  Surgeon: Keo Jenkins MD;  Location: [facility] CATH LAB/EP;  Service: Cardiology;  Laterality: Left;    LEFT HEART CATHETERIZATION Left 5/16/2023    Procedure: Left heart cath;  Surgeon: Keo Jenkins MD;  Location: [facility] CATH LAB/EP;  Service: Cardiology;  Laterality: Left;    LEFT HEART CATHETERIZATION Left 6/26/2024    Procedure: Left heart cath;  Surgeon: Enoch Tirado MD;  Location: [facility] CATH LAB/EP;  Service: Cardiology;  Laterality: Left;    LEFT HEART CATHETERIZATION Left 9/16/2024    Procedure: Left heart cath;  Surgeon: Enoch Tirado MD;  Location: [facility] CATH LAB/EP;  Service: Cardiology;  Laterality: Left;    LITHOTRIPSY, CORONARY TRANSLUMINAL, PERCUTANEOUS  9/16/2024    Procedure: LITHOTRIPSY, CORONARY TRANSLUMINAL, PERCUTANEOUS;  Surgeon: Enoch Tirado MD;  Location: [facility] CATH LAB/EP;  Service: Cardiology;;    PERCUTANEOUS CORONARY INTERVENTION, ARTERY N/A 6/26/2024    Procedure: Percutaneous coronary intervention;  Surgeon: Enoch Tirado MD;  Location: [facility] CATH LAB/EP;  Service: Cardiology;  Laterality: N/A;    PERCUTANEOUS TRANSLUMINAL ANGIOPLASTY (PTA) OF PERIPHERAL VESSEL N/A 7/12/2019    Procedure: PTA, PERIPHERAL VESSEL;  Surgeon: Keo Jenkins MD;  Location: [facility] CATH LAB/EP;  Service: Cardiology;  Laterality: N/A;    PERCUTANEOUS TRANSLUMINAL ANGIOPLASTY (PTA) OF PERIPHERAL VESSEL Left 5/20/2022    Procedure: PTA, PERIPHERAL VESSEL;  Surgeon: Keo Jenkins MD;  Location: [facility] CATH LAB/EP;  Service: Cardiology;  Laterality: Left;    PERCUTANEOUS TRANSLUMINAL ANGIOPLASTY (PTA) OF PERIPHERAL VESSEL Right 8/12/2022    Procedure: PTA, PERIPHERAL VESSEL;  Surgeon: Keo Jenkins MD;  Location: [facility] CATH LAB/EP;   Service: Cardiology;  Laterality: Right;    PERCUTANEOUS TRANSLUMINAL BALLOON ANGIOPLASTY OF CORONARY ARTERY  4/25/2023    Procedure: Angioplasty-coronary;  Surgeon: Keo Jenkins MD;  Location: [facility] CATH LAB/EP;  Service: Cardiology;;    PLACEMENT OF ARTERIOVENOUS GRAFT Left 4/15/2024    Procedure: INSERTION, GRAFT, ARTERIOVENOUS;  Surgeon: Scott Pascual MD;  Location: [facility] OR;  Service: General;  Laterality: Left;    PTCA, SINGLE VESSEL  5/16/2023    Procedure: PTCA, Single Vessel;  Surgeon: Keo Jenkins MD;  Location: [facility] CATH LAB/EP;  Service: Cardiology;;  CX    PTCA, SINGLE VESSEL  6/26/2024    Procedure: PTCA, Single Vessel;  Surgeon: Enoch Tirado MD;  Location: [facility] CATH LAB/EP;  Service: Cardiology;;    PTCA, SINGLE VESSEL Left 9/16/2024    Procedure: PTCA, Single Vessel;  Surgeon: Enoch Tirado MD;  Location: [facility] CATH LAB/EP;  Service: Cardiology;  Laterality: Left;    REMOVAL OF HEMODIALYSIS CATHETER Right 2/9/2024    Procedure: REMOVAL, CATHETER, HEMODIALYSIS;  Surgeon: Wang Mendieta MD;  Location: [facility] OR;  Service: General;  Laterality: Right;    REMOVAL OF TUNNELED CENTRAL VENOUS CATHETER (CVC) Right 5/20/2024    Procedure: REMOVAL, CATHETER, CENTRAL VENOUS, TUNNELED;  Surgeon: Scott Pascual MD;  Location: [facility] OR;  Service: General;  Laterality: Right;    REPAIR, CHRONIC TOTAL OCCLUSION, CORONARY  6/26/2024    Procedure: Repair, Chronic Total Occlusion, Coronary;  Surgeon: Enoch Tirado MD;  Location: [facility] CATH LAB/EP;  Service: Cardiology;;    STENT, DRUG ELUTING, SINGLE VESSEL, CORONARY  4/25/2023    Procedure: Stent, Drug Eluting, Single Vessel, Coronary;  Surgeon: Keo Jenkins MD;  Location: [facility] CATH LAB/EP;  Service: Cardiology;;    STENT, DRUG ELUTING, SINGLE VESSEL, CORONARY  5/16/2023    Procedure: Stent, Drug Eluting, Single Vessel, Coronary;  Surgeon: Keo Jenkins MD;  Location: [facility] CATH LAB/EP;  Service: Cardiology;;  CX     TRANSESOPHAGEAL ECHOCARDIOGRAM WITH POSSIBLE CARDIOVERSION (JOSE W/ POSS CARDIOVERSION) N/A 6/19/2023    Procedure: Transesophageal echo (JOSE) intra-procedure log documentation;  Surgeon: Keo Jenkins MD;  Location: [facility] CATH LAB/EP;  Service: Cardiology;  Laterality: N/A;       Review of Systems:   As documented in primary team H&P    Home Meds:   Prior to Admission medications    Medication Sig Start Date End Date Taking? Authorizing Provider   albuterol-ipratropium (DUO-NEB) 2.5 mg-0.5 mg/3 mL nebulizer solution Use 1 vial by nebulization every 6 (six) hours as needed for Wheezing or Shortness of Breath (or cough). Rescue 9/17/24 9/17/25 Yes Yolanda Zambrano MD   apixaban (ELIQUIS) 5 mg Tab Take 1 tablet (5 mg total) by mouth 2 (two) times daily. 4/30/25  Yes Enoch Tirado MD   carvediloL (COREG) 12.5 MG tablet Take 1 tablet (12.5 mg total) by mouth 2 (two) times daily with meals. 4/30/25 4/30/26 Yes Enoch Tirado MD   clopidogreL (PLAVIX) 75 mg tablet Take 1 tablet (75 mg total) by mouth once daily. 4/30/25  Yes Enoch Tirado MD   coenzyme Q10 100 mg capsule Take 1 capsule (100 mg total) by mouth once daily. 9/25/24 9/20/25 Yes nEoch Tirado MD   eplerenone (INSPRA) 50 MG Tab Take 50 mg by mouth once daily. 2/17/25  Yes Provider, Historical   hydrALAZINE (APRESOLINE) 50 MG tablet Take 1 tablet (50 mg total) by mouth every 12 (twelve) hours. 4/30/25 4/30/26 Yes Enoch Tirado MD   hydrOXYzine pamoate (VISTARIL) 25 MG Cap Take 1 capsule (25 mg total) by mouth nightly as needed (Sleep). 10/10/24  Yes Perez Bell DO   rosuvastatin (CRESTOR) 40 MG Tab Take 1 tablet (40 mg total) by mouth every evening. 9/25/24  Yes Enoch Tirado MD   levothyroxine (SYNTHROID) 75 MCG tablet Take 1 tablet (75 mcg total) by mouth before breakfast. 2/10/24 4/30/25  Jude Sanford MD   nitroGLYCERIN (NITROSTAT) 0.4 MG SL tablet Place 1 tablet (0.4 mg total) under the tongue every  5 (five) minutes as needed for Chest pain (for a max of 3 tabs in 15 minutes). 6/28/24 6/28/25  Yolanda Zambrano MD   ranolazine (RANEXA) 1,000 mg Tb12 Take 1 tablet (1,000 mg total) by mouth 2 (two) times daily. 4/30/25 4/30/26  Enoch Tirado MD     Scheduled Meds:    [START ON 6/27/2025] amiodarone  200 mg Oral Daily    amiodarone  400 mg Oral BID    aspirin  81 mg Oral Daily    atorvastatin  40 mg Oral QHS    clopidogreL  75 mg Oral Daily    folic acid  1 mg Oral Daily    glycerin adult  1 suppository Rectal Once    heparin (PORCINE)  80 Units/kg Intravenous Once    levothyroxine  75 mcg Oral Before breakfast    LIDOcaine  1 patch Transdermal Q24H    metoprolol succinate  100 mg Oral BID    perflutren protein-a microsphr  0.5 mL Intravenous Once    polyethylene glycol  17 g Oral Daily    sevelamer carbonate  1,600 mg Oral TID WM     Continuous Infusions:    heparin (porcine) in D5W  0-40 Units/kg/hr Intravenous Continuous 14.7 mL/hr at 06/21/25 0210 18 Units/kg/hr at 06/21/25 0210     PRN Meds:  Current Facility-Administered Medications:     0.9%  NaCl infusion (for blood administration), , Intravenous, Q24H PRN    0.9%  NaCl infusion (for blood administration), , Intravenous, Q24H PRN    0.9%  NaCl infusion (for blood administration), , Intravenous, Q24H PRN    albuterol sulfate, 2.5 mg, Nebulization, Q4H PRN    dextrose 50%, 12.5 g, Intravenous, PRN    dextrose 50%, 25 g, Intravenous, PRN    glucagon (human recombinant), 1 mg, Intramuscular, PRN    glucose, 16 g, Oral, PRN    glucose, 24 g, Oral, PRN    heparin (porcine), 2,400 Units, Intra-Catheter, PRN    heparin (PORCINE), 60 Units/kg, Intravenous, PRN    heparin (PORCINE), 30 Units/kg, Intravenous, PRN    HYDROmorphone, 0.5 mg, Intravenous, Q4H PRN    naloxone, 0.02 mg, Intravenous, PRN    oxyCODONE, 5 mg, Oral, Q6H PRN    sodium chloride 0.9%, 10 mL, Intravenous, Q12H PRN  Anticoagulants/Antiplatelets: Heparin    Allergies:   Review of patient's  "allergies indicates:   Allergen Reactions    Ace inhibitors Rash     Sedation Hx: have not been any systemic reactions    Labs:  Recent Labs   Lab 06/20/25  1957   INR 1.1       Recent Labs   Lab 06/21/25  0648   WBC 10.23   HGB 8.4*   HCT 26.8*   MCV 88         Recent Labs   Lab 06/16/25  1605 06/17/25  0401 06/18/25  0404 06/21/25  0914   * 101   < > 104   * 132*   < > 132*   K 4.2 4.5   < > 5.4*   CL 98 98   < > 99   CO2 19* 17*   < > 20*   BUN 79* 89*   < > 62*   CREATININE 6.6* 7.4*   < > 6.2*   CALCIUM 8.5* 8.5*   < > 8.2*   MG 2.3 2.2  --   --    ALT 15  --   --   --    AST 15  --   --   --    ALBUMIN 2.1* 2.1*   < > 2.2*   BILITOT 1.0  --   --   --     < > = values in this interval not displayed.         Vitals:  Temp: 97.4 °F (36.3 °C) (06/21/25 1335)  Pulse: (!) 58 (06/21/25 1335)  Resp: 18 (06/21/25 1335)  BP: 108/78 (06/21/25 1335)  SpO2: 99 % (06/21/25 0758)     Physical Exam:  ASA: 3  Mallampati: 3    General: no acute distress  Mental Status: alert and oriented to person, place and time  HEENT: normocephalic, atraumatic  Chest: unlabored breathing  Heart: regular heart rate  Abdomen: nondistended  Extremity: moves all extremities    Plan: Declot of thrombosed AV graft  Sedation Plan: up to moderate    Francis Brush MD ([place])  Interventional Radiology          "

## 2025-08-04 RX ORDER — INSULIN GLARGINE 100 [IU]/ML
10 INJECTION, SOLUTION SUBCUTANEOUS NIGHTLY
Qty: 9 ML | Refills: 1 | Status: SHIPPED | OUTPATIENT
Start: 2025-08-04

## 2025-08-04 RX ORDER — INSULIN GLARGINE 100 [IU]/ML
10 INJECTION, SOLUTION SUBCUTANEOUS NIGHTLY
Qty: 15 ML | Refills: 0 | OUTPATIENT
Start: 2025-08-04

## (undated) DIAGNOSIS — E11.40 TYPE 2 DIABETES MELLITUS WITH DIABETIC NEUROPATHY, WITHOUT LONG-TERM CURRENT USE OF INSULIN (HCC): ICD-10-CM

## (undated) DIAGNOSIS — I10 ESSENTIAL HYPERTENSION: ICD-10-CM

## (undated) DIAGNOSIS — R60.0 BILATERAL LOWER EXTREMITY EDEMA: ICD-10-CM

## (undated) DIAGNOSIS — E11.65 UNCONTROLLED TYPE 2 DIABETES MELLITUS WITH HYPERGLYCEMIA (HCC): ICD-10-CM

## (undated) NOTE — LETTER
Patient Name: Iliana Salas  YOB: 1950          MRN number:  [id number]  Date:  11/30/2017  Referring Physician:  Anitra Orosco     Discharge Summary    Pt has attended 6, cancelled 0, and no shown 0 visits in Physical Therapy.  Oral Brandon re I certify the need for these services furnished under this plan of treatment and while under my care.     X___________________________________________________ Date____________________    Certification From: [id number]/[id number]  To:2/28/2018

## (undated) NOTE — LETTER
03/15/21        Mina Padilla      Dear Maciel Shown records indicate that you have outstanding lab work and or testing that was ordered for you and has not yet been completed:  Orders Placed This Encounter

## (undated) NOTE — LETTER
01/10/22        [place] [id number]      Dear Hannah Freeman,    Our records indicate that you have outstanding lab work and or testing that was ordered for you and has not yet been completed:  Orders Placed This Encounter

## (undated) NOTE — ED AVS SNAPSHOT
Umberto Miller   MRN: [id number]    Department:  [facility] Emergency Department   Date of Visit:  4/18/2018           Disclosure     Insurance plans vary and the physician(s) referred by the ER may not be covered by your plan.  Please contact your tell this physician (or your personal doctor if your instructions are to return to your personal doctor) about any new or lasting problems. The primary care or specialist physician will see patients referred from the [facility] Emergency Department.  Catalina Estrada

## (undated) NOTE — LETTER
Patient Name: Sally Brewer  YOB: 1950          MRN number:  [id number]  Date:  11/9/2017  Referring Physician:  Ruby Olivo          SPINE EVALUATION:    Referring Physician: Dr. Leora Person  Diagnosis: Chronic bilateral thoracic back pain OBJECTIVE:   Observation/Posture: rounded shoulder posture  Neuro Screen: UE myotomes/dermatomes:  WNL  Shoulder AROM:  WNL bilat all planes  cervical ROM:   Flexion: 50 deg  Extension: 50 deg  Sidebending: R 10 deg, pain L; L 10 deg, pain L  Rotation: R 70 treatment options and has agreed to actively participate in planning and for this course of care. Thank you for your referral. Please co-sign or sign and return this letter via fax as soon as possible to [phone].  If you have any questions, please c

## (undated) NOTE — LETTER
07/12/21        [place] [address]      Dear Dayami Martinez,    Our records indicate that you have outstanding lab work and or testing that was ordered for you and has not yet been completed:  Orders Placed This Encounter

## (undated) NOTE — Clinical Note
I had the pleasure of seeing Chandler Moreno on 4/1/2019. Please see my attached note. Anita Leslie MD FACSEMG--Surgery

## (undated) NOTE — LETTER
Tuyet Maciel, :1950    CONSENT FOR PROCEDURE/SEDATION    1. I authorize the performance upon Tuyet Maciel  the following: Endometrial biopsy procedure    2. I authorize Dr. Mickey Salazar MD (and whomever is designated as the doctor’s assistant), to perform the above-mentioned procedures.    3. If any unforeseen conditions arise during this procedure calling for additional  procedures, operations, or medications (including anesthesia and blood transfusion), I further request and authorize the doctor to do whatever he/she deems advisable in my interest.    4. I consent to the taking and reproduction of any photographs in the course of this procedure for professional purposes.    5. I consent to the administration of such sedation as may be considered necessary or advisable by the physician responsible for this service, with the exception of ______________________________________________________    6. I have been informed by my doctor of the nature and purpose of this procedure sedation, possible alternative methods of treatment, risk involved and possible complications.    7. If I have a Do Not Resuscitate (DNR) order in place, the physician and I (or the individual authorized to consent on my behalf) will discuss and agree as to whether the Do Not Resuscitate (DNR) order will remain in effect or will be discontinued during the performance of the procedure and the applicable recovery period. If the Do Not Resuscitate (DNR) order is discontinued and is to be reinstated following the procedure/recovery period, the physician will determine when the applicable recovery period ends for purposes of reinstating the Do Not Resuscitate (DNR) order.    Signature of Patient:_______________________________________________    Signature of person authorized to consent for patient:  _______________________________________________________________    Relationship to patient:  ____________________________________________    Witness: _________________________________________ Date:___________     Physician Signature: _______________________________ Date:___________

## (undated) NOTE — Clinical Note
I saw Raffi Edelroxie, we're trying pessary to manage her pelvic organ prolapse symptoms. We'll follow her symptoms. I appreciate the opportunity to participate in her care.  Thank you, Janina Leiva

## (undated) NOTE — LETTER
August 2, 2024    Tuyet Maciel  [address]  [facility] [address]      Dear Tuyet:    The following are the results of your recent tests. Please review the list of test results.  Your result is the value on the left; we have also supplied the range of normal (low and high) values.    Results for orders placed or performed in visit on 07/25/24   Comp Metabolic Panel   Result Value Ref Range    Glucose 108 (H) 70 - 99 mg/dL    Sodium 138 136 - 145 mmol/L    Potassium 4.5 3.5 - 5.1 mmol/L    Chloride 103 98 - 112 mmol/L    CO2 29.0 21.0 - 32.0 mmol/L    Anion Gap 6 0 - 18 mmol/L    BUN 38 (H) 9 - 23 mg/dL    Creatinine 1.74 (H) 0.55 - 1.02 mg/dL    Calcium, Total 9.9 8.7 - 10.4 mg/dL    Calculated Osmolality 296 (H) 275 - 295 mOsm/kg    eGFR-Cr 30 (L) >=60 mL/min/1.73m2    AST 21 <34 U/L    ALT 13 10 - 49 U/L    Alkaline Phosphatase 90 55 - 142 U/L    Bilirubin, Total 0.5 0.2 - 1.1 mg/dL    Total Protein 8.8 (H) 5.7 - 8.2 g/dL    Albumin 4.6 3.2 - 4.8 g/dL    Globulin  4.2 (H) 2.0 - 3.5 g/dL    A/G Ratio 1.1 1.0 - 2.0    Patient Fasting for CMP? Yes    Lipid Panel   Result Value Ref Range    Cholesterol, Total 189 <200 mg/dL    HDL Cholesterol 47 40 - 59 mg/dL    Triglycerides 82 30 - 149 mg/dL    LDL Cholesterol 127 (H) <100 mg/dL    VLDL 15 0 - 30 mg/dL    Non HDL Chol 142 (H) <130 mg/dL    Patient Fasting for Lipid? Yes    Hemoglobin A1C   Result Value Ref Range    HgbA1C 6.5 (H) <5.7 %    Estimated Average Glucose 140 (H) 68 - 126 mg/dL   TSH W Reflex To Free T4   Result Value Ref Range    TSH 1.253 0.550 - 4.780 mIU/mL   Vitamin D, 25-Hydroxy   Result Value Ref Range    Vitamin D, 25OH, Total 27.0 (L) 30.0 - 100.0 ng/mL   CBC W/ DIFFERENTIAL   Result Value Ref Range    WBC 6.4 4.0 - 11.0 x10(3) uL    RBC 4.68 3.80 - 5.30 x10(6)uL    HGB 13.9 12.0 - 16.0 g/dL    HCT 40.8 35.0 - 48.0 %    .0 150.0 - 450.0 10(3)uL    MCV 87.2 80.0 - 100.0 fL    MCH 29.7 26.0 - 34.0 pg    MCHC 34.1 31.0 - 37.0 g/dL     RDW 14.5 %    Neutrophil Absolute Prelim 4.06 1.50 - 7.70 x10 (3) uL    Neutrophil Absolute 4.06 1.50 - 7.70 x10(3) uL    Lymphocyte Absolute 1.18 1.00 - 4.00 x10(3) uL    Monocyte Absolute 0.60 0.10 - 1.00 x10(3) uL    Eosinophil Absolute 0.43 0.00 - 0.70 x10(3) uL    Basophil Absolute 0.09 0.00 - 0.20 x10(3) uL    Immature Granulocyte Absolute 0.02 0.00 - 1.00 x10(3) uL    Neutrophil % 63.7 %    Lymphocyte % 18.5 %    Monocyte % 9.4 %    Eosinophil % 6.7 %    Basophil % 1.4 %    Immature Granulocyte % 0.3 %       Please call if you have further questions [phone].   [facility] .

## (undated) NOTE — LETTER
04/30/19            Mina Padilla      I am writing to you to remind you to schedule your annual diabetic eye exam.  Diabetes remains one of the leading causes of blindness in American adults.   Early diagnosis is

## (undated) NOTE — LETTER
06/20/18        [place] [id number]      Dear Morales Gutierrez,    Our records indicate that you have outstanding lab work and or testing that was ordered for you and has not yet been completed:          Basic Metabolic Panel (